# Patient Record
Sex: FEMALE | Race: WHITE | NOT HISPANIC OR LATINO | Employment: FULL TIME | URBAN - METROPOLITAN AREA
[De-identification: names, ages, dates, MRNs, and addresses within clinical notes are randomized per-mention and may not be internally consistent; named-entity substitution may affect disease eponyms.]

---

## 2017-02-02 ENCOUNTER — OFFICE VISIT (OUTPATIENT)
Dept: FAMILY MEDICINE | Facility: CLINIC | Age: 72
End: 2017-02-02
Payer: COMMERCIAL

## 2017-02-02 VITALS
RESPIRATION RATE: 20 BRPM | WEIGHT: 178.56 LBS | TEMPERATURE: 98 F | DIASTOLIC BLOOD PRESSURE: 86 MMHG | HEART RATE: 78 BPM | HEIGHT: 65 IN | BODY MASS INDEX: 29.75 KG/M2 | SYSTOLIC BLOOD PRESSURE: 132 MMHG | OXYGEN SATURATION: 96 %

## 2017-02-02 DIAGNOSIS — H10.33 ACUTE BACTERIAL CONJUNCTIVITIS OF BOTH EYES: ICD-10-CM

## 2017-02-02 DIAGNOSIS — J06.9 VIRAL URI WITH COUGH: Primary | ICD-10-CM

## 2017-02-02 PROCEDURE — 96372 THER/PROPH/DIAG INJ SC/IM: CPT | Mod: S$GLB,,, | Performed by: NURSE PRACTITIONER

## 2017-02-02 PROCEDURE — 1125F AMNT PAIN NOTED PAIN PRSNT: CPT | Mod: S$GLB,,, | Performed by: NURSE PRACTITIONER

## 2017-02-02 PROCEDURE — 1157F ADVNC CARE PLAN IN RCRD: CPT | Mod: S$GLB,,, | Performed by: NURSE PRACTITIONER

## 2017-02-02 PROCEDURE — 99214 OFFICE O/P EST MOD 30 MIN: CPT | Mod: 25,S$GLB,, | Performed by: NURSE PRACTITIONER

## 2017-02-02 PROCEDURE — 99999 PR PBB SHADOW E&M-EST. PATIENT-LVL IV: CPT | Mod: PBBFAC,,, | Performed by: NURSE PRACTITIONER

## 2017-02-02 PROCEDURE — 1160F RVW MEDS BY RX/DR IN RCRD: CPT | Mod: S$GLB,,, | Performed by: NURSE PRACTITIONER

## 2017-02-02 PROCEDURE — 1159F MED LIST DOCD IN RCRD: CPT | Mod: S$GLB,,, | Performed by: NURSE PRACTITIONER

## 2017-02-02 RX ORDER — FLUTICASONE PROPIONATE 50 MCG
2 SPRAY, SUSPENSION (ML) NASAL DAILY
Qty: 16 G | Refills: 0 | Status: SHIPPED | OUTPATIENT
Start: 2017-02-02 | End: 2018-02-14

## 2017-02-02 RX ORDER — OFLOXACIN 3 MG/ML
1 SOLUTION/ DROPS OPHTHALMIC 4 TIMES DAILY
Qty: 5 ML | Refills: 0 | Status: SHIPPED | OUTPATIENT
Start: 2017-02-02 | End: 2017-02-04 | Stop reason: SDUPTHER

## 2017-02-02 RX ORDER — DEXAMETHASONE SODIUM PHOSPHATE 4 MG/ML
8 INJECTION, SOLUTION INTRA-ARTICULAR; INTRALESIONAL; INTRAMUSCULAR; INTRAVENOUS; SOFT TISSUE
Status: COMPLETED | OUTPATIENT
Start: 2017-02-02 | End: 2017-02-02

## 2017-02-02 RX ORDER — PROMETHAZINE HYDROCHLORIDE AND DEXTROMETHORPHAN HYDROBROMIDE 6.25; 15 MG/5ML; MG/5ML
5 SYRUP ORAL
Qty: 180 ML | Refills: 0 | Status: SHIPPED | OUTPATIENT
Start: 2017-02-02 | End: 2017-02-12

## 2017-02-02 RX ORDER — TAZAROTENE 0.1 MG/G
CREAM CUTANEOUS
Refills: 6 | COMMUNITY
Start: 2016-12-27 | End: 2020-03-12 | Stop reason: CLARIF

## 2017-02-02 RX ORDER — LEVOCETIRIZINE DIHYDROCHLORIDE 5 MG/1
5 TABLET, FILM COATED ORAL NIGHTLY
Qty: 30 TABLET | Refills: 1 | Status: SHIPPED | OUTPATIENT
Start: 2017-02-02 | End: 2019-06-26

## 2017-02-02 RX ADMIN — DEXAMETHASONE SODIUM PHOSPHATE 8 MG: 4 INJECTION, SOLUTION INTRA-ARTICULAR; INTRALESIONAL; INTRAMUSCULAR; INTRAVENOUS; SOFT TISSUE at 02:02

## 2017-02-02 NOTE — MR AVS SNAPSHOT
[facility]  [address] [place] [address]  Phone: [phone]                  Gale Fernández   2017 2:00 PM   Office Visit    Description:  Female : 1945   Provider:  JIMMY SpencerC   Department:  [facility]           Reason for Visit     Sinus Problem           Diagnoses this Visit        Comments    Viral URI with cough    -  Primary            To Do List           Goals (5 Years of Data)     Increase water intake       Follow-Up and Disposition     Return if symptoms worsen or fail to improve.       These Medications        Disp Refills Start End    fluticasone (FLONASE) 50 mcg/actuation nasal spray 16 g 0 2017     2 sprays by Each Nare route once daily. - Each Nare    Pharmacy: [facility]/pharmacy #[address] [place], [place] [address] [facility] Ph #: [phone]       levocetirizine (XYZAL) 5 MG tablet 30 tablet 1 2017    Take 1 tablet (5 mg total) by mouth every evening. - Oral    Pharmacy: [facility]/pharmacy #[address] [place] [address] [facility] Ph #: [phone]       promethazine-dextromethorphan (PROMETHAZINE-DM) 6.25-15 mg/5 mL Syrp 180 mL 0 2017    Take 5 mLs by mouth every 4 to 6 hours as needed. - Oral    [facility] [place], [place] [address] [facility] Ph #: [phone]       ofloxacin (OCUFLOX) 0.3 % ophthalmic solution 5 mL 0 2017    Place 1 drop into both eyes 4 (four) times daily. - Both Eyes    [facility] [place], [place] [address] [facility] Ph #: [phone]         [facility]     [facility] -  Assistance  Registered nurses in the [facility] provide clinical advisement, health education, appointment booking, and other advisory services.  Call for this free service at [phone].             Medications           Message regarding Medications     Verify the  changes and/or additions to your medication regime listed below are the same as discussed with your clinician today.  If any of these changes or additions are incorrect, please notify your healthcare provider.        START taking these NEW medications        Refills    fluticasone (FLONASE) 50 mcg/actuation nasal spray 0    Si sprays by Each Nare route once daily.    Class: Normal    Route: Each Nare    levocetirizine (XYZAL) 5 MG tablet 1    Sig: Take 1 tablet (5 mg total) by mouth every evening.    Class: Normal    Route: Oral    promethazine-dextromethorphan (PROMETHAZINE-DM) 6.25-15 mg/5 mL Syrp 0    Sig: Take 5 mLs by mouth every 4 to 6 hours as needed.    Class: Normal    Route: Oral    ofloxacin (OCUFLOX) 0.3 % ophthalmic solution 0    Sig: Place 1 drop into both eyes 4 (four) times daily.    Class: Normal    Route: Both Eyes      These medications were administered today        Dose Freq    dexamethasone injection 8 mg 8 mg Clinic/HOD 1 time    Sig: Inject 2 mLs (8 mg total) into the muscle one time.    Class: Normal    Route: Intramuscular           Verify that the below list of medications is an accurate representation of the medications you are currently taking.  If none reported, the list may be blank. If incorrect, please contact your healthcare provider. Carry this list with you in case of emergency.           Current Medications     alprazolam (XANAX) 0.25 MG tablet TAKE 1 TABLET BY MOUTH AT BEDTIME AS NEEDED FOR ANXIETRY    dronedarone (MULTAQ) 400 mg Tab Take 1 tablet (400 mg total) by mouth 2 (two) times daily with meals.    fluticasone (FLONASE) 50 mcg/actuation nasal spray 2 sprays by Each Nare route once daily.    levocetirizine (XYZAL) 5 MG tablet Take 1 tablet (5 mg total) by mouth every evening.    lidocaine HCL 4% (XYLOCAINE) 4 % (40 mg/mL) external solution Apply 4 mLs topically as needed.    lidocaine-prilocaine (EMLA) cream Apply topically as needed.    metoprolol succinate  "(TOPROL-XL) 25 MG 24 hr tablet Take 1 tablet (25 mg total) by mouth once daily.    metoprolol succinate (TOPROL-XL) 25 MG 24 hr tablet TAKE 1 TABLET (25 MG TOTAL) BY MOUTH ONCE DAILY.    MULTAQ 400 mg Tab TAKE 1 TABLET (400 MG TOTAL) BY MOUTH 2 (TWO) TIMES DAILY WITH MEALS.    nitrofurantoin, macrocrystal-monohydrate, (MACROBID) 100 MG capsule Take postcoital    ofloxacin (OCUFLOX) 0.3 % ophthalmic solution Place 1 drop into both eyes 4 (four) times daily.    promethazine-dextromethorphan (PROMETHAZINE-DM) 6.25-15 mg/5 mL Syrp Take 5 mLs by mouth every 4 to 6 hours as needed.    rivaroxaban (XARELTO) 20 mg Tab Take 1 tablet (20 mg total) by mouth every evening.    TAZORAC 0.1 % cream APPLY TO AFFECTED AREA EVERY DAY AT BEDTIME           Clinical Reference Information           Your Vitals Were     BP Pulse Temp Resp Height Weight    132/86 (BP Location: Right arm, Patient Position: Sitting, BP Method: Manual) 78 98.3 °F (36.8 °C) (Oral) 20 5' 5" (1.651 m) 81 kg (178 lb 9.2 oz)    Last Period SpO2 BMI          07/24/2012 96% 29.72 kg/m2        Blood Pressure          Most Recent Value    BP  132/86      Allergies as of 2/2/2017     Flecainide      Immunizations Administered on Date of Encounter - 2/2/2017     None      Instructions      Viral Upper Respiratory Illness (Adult)  You have a viral upper respiratory illness (URI), which is another term for the common cold. This illness is contagious during the first few days. It is spread through the air by coughing and sneezing. It may also be spread by direct contact (touching the sick person and then touching your own eyes, nose, or mouth). Frequent handwashing will decrease risk of spread. Most viral illnesses go away within 7 to 10 days with rest and simple home remedies. Sometimes the illness may last for several weeks. Antibiotics will not kill a virus, and they are generally not prescribed for this condition.    Home care  · If symptoms are severe, rest at home " for the first 2 to 3 days. When you resume activity, don't let yourself get too tired.  · Avoid being exposed to cigarette smoke (yours or others).  · You may use acetaminophen or ibuprofen to control pain and fever, unless another medicine was prescribed. (Note: If you have chronic liver or kidney disease, have ever had a stomach ulcer or gastrointestinal bleeding, or are taking blood-thinning medicines, talk with your healthcare provider before using these medicines.) Aspirin should never be given to anyone under 18 years of age who is ill with a viral infection or fever. It may cause severe liver or brain damage.  · Your appetite may be poor, so a light diet is fine. Avoid dehydration by drinking 6 to 8 glasses of fluids per day (water, soft drinks, juices, tea, or soup). Extra fluids will help loosen secretions in the nose and lungs.  · Over-the-counter cold medicines will not shorten the length of time youre sick, but they may be helpful for the following symptoms: cough, sore throat, and nasal and sinus congestion. (Note: Do not use decongestants if you have high blood pressure.)  Follow-up care  Follow up with your healthcare provider, or as advised.  When to seek medical advice  Call your healthcare provider right away if any of these occur:  · Cough with lots of colored sputum (mucus)  · Severe headache; face, neck, or ear pain  · Difficulty swallowing due to throat pain  · Fever of 100.4°F (38°C)  Call 911, or get immediate medical care  Call emergency services right away if any of these occur:  · Chest pain, shortness of breath, wheezing, or difficulty breathing  · Coughing up blood  · Inability to swallow due to throat pain  © 0467-4628 [employer]. [address] [place] [address], [place] [address]. All rights reserved. This information is not intended as a substitute for professional medical care. Always follow your healthcare professional's instructions.        Conjunctivitis, Viral    Viral  conjunctivitis (sometimes called pink eye) is a common infection of the eye. It is very contagious. Touching the infected eye, then touching another person passes this infection. It can also be spread from one eye to the other in this same way. The most common symptoms include redness, discharge from the eye, swollen eyelids, and a gritty or scratchy feeling in the eye.  This condition will take about 7 to 10 days to go away. Artificial tears (available without a prescription) are often recommended to moisten and clean the eyes. Antibiotic eye drops often are not recommended because they will not kill the virus. But sometimes they may be prescribed by eye doctors. This is to prevent a second, bacterial infection.  Home care  · Apply a towel soaked in cool water to the affected eye 3 to 4 times a day (just before applying medicine to the eye).  · It is common to have mucus drainage during the night, causing the eyelids to become crusted by morning. Use a warm, wet cloth to wipe this away.  · Launder cloths used to clean the eye after one use. Do not reuse them.  · If antibiotic medicines are prescribed, take them exactly as directed. Do not stop taking them until you are told to.  · You may use acetaminophen or ibuprofen to control pain, unless another medicine was prescribed. (Note:If you have chronic liver or kidney disease, or if you have ever had a stomach ulcer or gastrointestinal bleeding, talk with your healthcare provider before using these medicines.) Aspirin should never be used in anyone under 18 years of age who is ill with a fever. It may cause severe liver damage.  · Wash your hands before and after touching the affected eye. This helps to prevent spreading the infection to your other eye and to others.  · The infected person should avoid sharing towels, washcloths, and bedding with others. This is to prevent spreading the infection.  · This illness is contagious during the first week. Children with  this illness should be kept out of day care and school until the redness clears.  Follow-up care  Follow up with your healthcare provider, or as advised.  When to seek medical advice  Call your healthcare provider right away if any of these occur:  · Worsening vision  · Increasing pain in the eye  · Increasing swelling or redness of the eyelid  · Redness spreading to the face around the eye  · Large amount of green or yellow drainage from the eye  · Severe itching in or around the eye  · Fever of 100.4°F (38°C) or higher  © 0709-4483 [employer]. [address] [place] [address]. All rights reserved. This information is not intended as a substitute for professional medical care. Always follow your healthcare professional's instructions.             Language Assistance Services     ATTENTION: Language assistance services are available, free of charge. Please call [phone].      ATENCIÓN: Si nadege francisco, tiene a briones disposición servicios gratuitos de asistencia lingüística. Llame al [phone].     CHÚ Ý: N?u b?n nói Ti?ng Vi?t, có các d?ch v? h? tr? ngôn ng? mi?n phí dành cho b?n. G?i s? [phone].         [facility] complies with applicable Federal civil rights laws and does not discriminate on the basis of race, color, national origin, age, disability, or sex.

## 2017-02-02 NOTE — PROGRESS NOTES
Patient tolerate Decadron 8 mg IM injection. Patient advise to wait 15 min after injection  for assessment of any posssible side effects.

## 2017-02-02 NOTE — PATIENT INSTRUCTIONS
Viral Upper Respiratory Illness (Adult)  You have a viral upper respiratory illness (URI), which is another term for the common cold. This illness is contagious during the first few days. It is spread through the air by coughing and sneezing. It may also be spread by direct contact (touching the sick person and then touching your own eyes, nose, or mouth). Frequent handwashing will decrease risk of spread. Most viral illnesses go away within 7 to 10 days with rest and simple home remedies. Sometimes the illness may last for several weeks. Antibiotics will not kill a virus, and they are generally not prescribed for this condition.    Home care  · If symptoms are severe, rest at home for the first 2 to 3 days. When you resume activity, don't let yourself get too tired.  · Avoid being exposed to cigarette smoke (yours or others).  · You may use acetaminophen or ibuprofen to control pain and fever, unless another medicine was prescribed. (Note: If you have chronic liver or kidney disease, have ever had a stomach ulcer or gastrointestinal bleeding, or are taking blood-thinning medicines, talk with your healthcare provider before using these medicines.) Aspirin should never be given to anyone under 18 years of age who is ill with a viral infection or fever. It may cause severe liver or brain damage.  · Your appetite may be poor, so a light diet is fine. Avoid dehydration by drinking 6 to 8 glasses of fluids per day (water, soft drinks, juices, tea, or soup). Extra fluids will help loosen secretions in the nose and lungs.  · Over-the-counter cold medicines will not shorten the length of time youre sick, but they may be helpful for the following symptoms: cough, sore throat, and nasal and sinus congestion. (Note: Do not use decongestants if you have high blood pressure.)  Follow-up care  Follow up with your healthcare provider, or as advised.  When to seek medical advice  Call your healthcare provider right away if any  of these occur:  · Cough with lots of colored sputum (mucus)  · Severe headache; face, neck, or ear pain  · Difficulty swallowing due to throat pain  · Fever of 100.4°F (38°C)  Call 911, or get immediate medical care  Call emergency services right away if any of these occur:  · Chest pain, shortness of breath, wheezing, or difficulty breathing  · Coughing up blood  · Inability to swallow due to throat pain  © 2000-2016 [employer]. [address] [place] [address], [place] [address]. All rights reserved. This information is not intended as a substitute for professional medical care. Always follow your healthcare professional's instructions.        Conjunctivitis, Viral    Viral conjunctivitis (sometimes called pink eye) is a common infection of the eye. It is very contagious. Touching the infected eye, then touching another person passes this infection. It can also be spread from one eye to the other in this same way. The most common symptoms include redness, discharge from the eye, swollen eyelids, and a gritty or scratchy feeling in the eye.  This condition will take about 7 to 10 days to go away. Artificial tears (available without a prescription) are often recommended to moisten and clean the eyes. Antibiotic eye drops often are not recommended because they will not kill the virus. But sometimes they may be prescribed by eye doctors. This is to prevent a second, bacterial infection.  Home care  · Apply a towel soaked in cool water to the affected eye 3 to 4 times a day (just before applying medicine to the eye).  · It is common to have mucus drainage during the night, causing the eyelids to become crusted by morning. Use a warm, wet cloth to wipe this away.  · Launder cloths used to clean the eye after one use. Do not reuse them.  · If antibiotic medicines are prescribed, take them exactly as directed. Do not stop taking them until you are told to.  · You may use acetaminophen or ibuprofen to control pain,  unless another medicine was prescribed. (Note:If you have chronic liver or kidney disease, or if you have ever had a stomach ulcer or gastrointestinal bleeding, talk with your healthcare provider before using these medicines.) Aspirin should never be used in anyone under 18 years of age who is ill with a fever. It may cause severe liver damage.  · Wash your hands before and after touching the affected eye. This helps to prevent spreading the infection to your other eye and to others.  · The infected person should avoid sharing towels, washcloths, and bedding with others. This is to prevent spreading the infection.  · This illness is contagious during the first week. Children with this illness should be kept out of day care and school until the redness clears.  Follow-up care  Follow up with your healthcare provider, or as advised.  When to seek medical advice  Call your healthcare provider right away if any of these occur:  · Worsening vision  · Increasing pain in the eye  · Increasing swelling or redness of the eyelid  · Redness spreading to the face around the eye  · Large amount of green or yellow drainage from the eye  · Severe itching in or around the eye  · Fever of 100.4°F (38°C) or higher  © 6468-3370 [employer]. [address] [place] [address], [place] [address]. All rights reserved. This information is not intended as a substitute for professional medical care. Always follow your healthcare professional's instructions.

## 2017-02-02 NOTE — PROGRESS NOTES
Subjective:       Patient ID: Gale Fernández is a 71 y.o. female.    Chief Complaint: Sinus Problem    URI    This is a new problem. The current episode started in the past 7 days. The problem has been gradually worsening. There has been no fever. Associated symptoms include congestion, coughing, headaches, rhinorrhea, sinus pain and a sore throat. Pertinent negatives include no ear pain or sneezing. Treatments tried: OTC medication. The treatment provided mild relief.       Past Medical History   Diagnosis Date    Atrial fibrillation      Followed by [facility] cardiology    Borderline hyperlipidemia     History of abnormal Pap smear      More than 20 years ago; status post colposcopy    History of anxiety disorder     History of hypertension      Currently doing okay off medication    History of ventricular tachycardia      (PSVT) Status post ablation    Osteopenia      Refuses medication    Overweight(278.02)        Social History     Social History    Marital status:      Spouse name: N/A    Number of children: 1    Years of education: N/A     Occupational History    Teacher      Social History Main Topics    Smoking status: Former Smoker     Types: Cigarettes    Smokeless tobacco: Not on file    Alcohol use Yes      Comment: Rarely    Drug use: No    Sexual activity: Not on file     Other Topics Concern    Not on file     Social History Narrative       Past Surgical History   Procedure Laterality Date    Psvt ablation       section, low transverse       X1    Skin cancer removal on the left eye      Left knee meniscus surgery  2012    Neck tuck and liposuction  2012       Review of Systems   Constitutional: Positive for chills. Negative for fever.   HENT: Positive for congestion, postnasal drip, rhinorrhea, sinus pressure and sore throat. Negative for ear pain, sneezing and trouble swallowing.    Eyes: Positive for discharge, redness and itching.   Respiratory: Positive for cough.   "  Neurological: Positive for headaches.   All other systems reviewed and are negative.      Objective:     Visit Vitals    /86 (BP Location: Right arm, Patient Position: Sitting, BP Method: Manual)    Pulse 78    Temp 98.3 °F (36.8 °C) (Oral)    Resp 20    Ht 5' 5" (1.651 m)    Wt 81 kg (178 lb 9.2 oz)    LMP 07/24/2012    SpO2 96%    BMI 29.72 kg/m2        Physical Exam   Constitutional: She is oriented to person, place, and time. She appears well-developed and well-nourished. She is cooperative.   HENT:   Head: Normocephalic and atraumatic.   Right Ear: Hearing, external ear and ear canal normal. A middle ear effusion is present.   Left Ear: Hearing, external ear and ear canal normal. A middle ear effusion is present.   Nose: Mucosal edema and rhinorrhea present. Right sinus exhibits maxillary sinus tenderness. Left sinus exhibits maxillary sinus tenderness.   Mouth/Throat: Uvula is midline and mucous membranes are normal. No oropharyngeal exudate, posterior oropharyngeal edema or posterior oropharyngeal erythema.   Eyes: Right eye exhibits discharge. Left eye exhibits discharge and exudate. Right conjunctiva is injected. Left conjunctiva is injected.   Cardiovascular: Normal rate and regular rhythm.    Pulmonary/Chest: Effort normal and breath sounds normal. No respiratory distress. She has no decreased breath sounds. She has no wheezes. She has no rhonchi. She has no rales.   Lymphadenopathy:     She has no cervical adenopathy.   Neurological: She is alert and oriented to person, place, and time.   Skin: Skin is warm, dry and intact. She is not diaphoretic. No pallor.   Psychiatric: She has a normal mood and affect. Her speech is normal and behavior is normal.   Vitals reviewed.      Assessment:       1. Viral URI with cough    2. Acute bacterial conjunctivitis of both eyes        Plan:       Gale was seen today for sinus problem.    Diagnoses and all orders for this visit:    Viral URI with " "cough  -     dexamethasone injection 8 mg; Inject 2 mLs (8 mg total) into the muscle one time.  -     fluticasone (FLONASE) 50 mcg/actuation nasal spray; 2 sprays by Each Nare route once daily.  -     levocetirizine (XYZAL) 5 MG tablet; Take 1 tablet (5 mg total) by mouth every evening.  -     promethazine-dextromethorphan (PROMETHAZINE-DM) 6.25-15 mg/5 mL Syrp; Take 5 mLs by mouth every 4 to 6 hours as needed.    Acute bacterial conjunctivitis of both eyes  -     ofloxacin (OCUFLOX) 0.3 % ophthalmic solution; Place 1 drop into both eyes 4 (four) times daily.    Patient provided information on diagnosis obtained through [employer]. She verbalized understanding. Advised to discard contacts and were glasses for 7-10 days.   Return if symptoms worsen or fail to improve.  "This note will not be shared with the patient."  "

## 2017-02-03 ENCOUNTER — NURSE TRIAGE (OUTPATIENT)
Dept: ADMINISTRATIVE | Facility: CLINIC | Age: 72
End: 2017-02-03

## 2017-02-04 ENCOUNTER — OFFICE VISIT (OUTPATIENT)
Dept: FAMILY MEDICINE | Facility: CLINIC | Age: 72
End: 2017-02-04
Payer: COMMERCIAL

## 2017-02-04 VITALS
WEIGHT: 177.81 LBS | HEART RATE: 78 BPM | BODY MASS INDEX: 29.62 KG/M2 | HEIGHT: 65 IN | OXYGEN SATURATION: 98 % | DIASTOLIC BLOOD PRESSURE: 76 MMHG | SYSTOLIC BLOOD PRESSURE: 148 MMHG | TEMPERATURE: 98 F

## 2017-02-04 DIAGNOSIS — H92.02 OTALGIA, LEFT: ICD-10-CM

## 2017-02-04 DIAGNOSIS — H72.92 TYMPANIC MEMBRANE RUPTURE, LEFT: Primary | ICD-10-CM

## 2017-02-04 DIAGNOSIS — I48.0 PAROXYSMAL ATRIAL FIBRILLATION: ICD-10-CM

## 2017-02-04 DIAGNOSIS — Z79.01 CURRENT USE OF LONG TERM ANTICOAGULATION: ICD-10-CM

## 2017-02-04 PROCEDURE — 1159F MED LIST DOCD IN RCRD: CPT | Mod: S$GLB,,, | Performed by: FAMILY MEDICINE

## 2017-02-04 PROCEDURE — 1125F AMNT PAIN NOTED PAIN PRSNT: CPT | Mod: S$GLB,,, | Performed by: FAMILY MEDICINE

## 2017-02-04 PROCEDURE — 99999 PR PBB SHADOW E&M-EST. PATIENT-LVL IV: CPT | Mod: PBBFAC,,, | Performed by: FAMILY MEDICINE

## 2017-02-04 PROCEDURE — 1157F ADVNC CARE PLAN IN RCRD: CPT | Mod: S$GLB,,, | Performed by: FAMILY MEDICINE

## 2017-02-04 PROCEDURE — 99214 OFFICE O/P EST MOD 30 MIN: CPT | Mod: S$GLB,,, | Performed by: FAMILY MEDICINE

## 2017-02-04 PROCEDURE — 1160F RVW MEDS BY RX/DR IN RCRD: CPT | Mod: S$GLB,,, | Performed by: FAMILY MEDICINE

## 2017-02-04 RX ORDER — AMOXICILLIN AND CLAVULANATE POTASSIUM 875; 125 MG/1; MG/1
1 TABLET, FILM COATED ORAL 2 TIMES DAILY
Qty: 20 TABLET | Refills: 0 | Status: SHIPPED | OUTPATIENT
Start: 2017-02-04 | End: 2017-02-14

## 2017-02-04 RX ORDER — ACETAMINOPHEN AND CODEINE PHOSPHATE 300; 30 MG/1; MG/1
1 TABLET ORAL
Qty: 25 TABLET | Refills: 0 | Status: SHIPPED | OUTPATIENT
Start: 2017-02-04 | End: 2017-02-14

## 2017-02-04 RX ORDER — OFLOXACIN 3 MG/ML
1 SOLUTION/ DROPS OPHTHALMIC 4 TIMES DAILY
Qty: 5 ML | Refills: 0 | Status: SHIPPED | OUTPATIENT
Start: 2017-02-04 | End: 2017-02-14

## 2017-02-04 NOTE — PROGRESS NOTES
Chief Complaint   Patient presents with    Otalgia       HPI  Gale Fernández is a 71 y.o. female with multiple medical diagnoses as listed in the medical history and problem list that presents for evaluation for left ear pain for several days. She was seen two days ago and diagnosed with a viral URI for which she was given a steroid shot along with antibiotics for an eye infection. She has misplaced these. She has felt pressure and fluid in her ear. Last night she was blowing her nose when she felt fluid running out and noticed it was bloody. She held her xarelto which she takes for atrial fibrillation. This AM it has stopped bleeding but she is having some pain. She has mild hearing loss in her ear.     PAST MEDICAL HISTORY:  Past Medical History   Diagnosis Date    Atrial fibrillation      Followed by EP cardiology    Borderline hyperlipidemia     History of abnormal Pap smear      More than 20 years ago; status post colposcopy    History of anxiety disorder     History of hypertension      Currently doing okay off medication    History of ventricular tachycardia      (PSVT) Status post ablation    Osteopenia      Refuses medication    Overweight(278.02)        PAST SURGICAL HISTORY:  Past Surgical History   Procedure Laterality Date    Psvt ablation       section, low transverse       X1    Skin cancer removal on the left eye      Left knee meniscus surgery  2012    Neck tuck and liposuction  2012       SOCIAL HISTORY:  Social History     Social History    Marital status:      Spouse name: N/A    Number of children: 1    Years of education: N/A     Occupational History    Teacher      Social History Main Topics    Smoking status: Former Smoker     Types: Cigarettes    Smokeless tobacco: Not on file    Alcohol use Yes      Comment: Rarely    Drug use: No    Sexual activity: Not on file     Other Topics Concern    Not on file     Social History Narrative       FAMILY  HISTORY:  Family History   Problem Relation Age of Onset    Heart disease Mother     Cancer Father      nasopharynx    Breast cancer Cousin     Hypertension Sister     Diabetes Sister     Coronary artery disease Brother      s/p stenting    Hypertension Brother     Rheum arthritis Sister     Hypertension Brother     Hypertension Brother     Heart disease Brother      s/p CABG    Hypertension Brother     Coronary artery disease Brother      s/p stenting    Colon cancer Neg Hx        ALLERGIES AND MEDICATIONS: updated and reviewed.  Review of patient's allergies indicates:   Allergen Reactions    Flecainide      Other reaction(s): worsening arrythmia     Current Outpatient Prescriptions   Medication Sig Dispense Refill    alprazolam (XANAX) 0.25 MG tablet TAKE 1 TABLET BY MOUTH AT BEDTIME AS NEEDED FOR ANXIETRY 30 tablet 1    dronedarone (MULTAQ) 400 mg Tab Take 1 tablet (400 mg total) by mouth 2 (two) times daily with meals. 90 tablet 3    levocetirizine (XYZAL) 5 MG tablet Take 1 tablet (5 mg total) by mouth every evening. 30 tablet 1    lidocaine HCL 4% (XYLOCAINE) 4 % (40 mg/mL) external solution Apply 4 mLs topically as needed. 50 mL 2    lidocaine-prilocaine (EMLA) cream Apply topically as needed. 25 g 0    metoprolol succinate (TOPROL-XL) 25 MG 24 hr tablet Take 1 tablet (25 mg total) by mouth once daily. 90 tablet 3    metoprolol succinate (TOPROL-XL) 25 MG 24 hr tablet TAKE 1 TABLET (25 MG TOTAL) BY MOUTH ONCE DAILY. 30 tablet 10    MULTAQ 400 mg Tab TAKE 1 TABLET (400 MG TOTAL) BY MOUTH 2 (TWO) TIMES DAILY WITH MEALS. 60 tablet 10    promethazine-dextromethorphan (PROMETHAZINE-DM) 6.25-15 mg/5 mL Syrp Take 5 mLs by mouth every 4 to 6 hours as needed. 180 mL 0    rivaroxaban (XARELTO) 20 mg Tab Take 1 tablet (20 mg total) by mouth every evening. 90 tablet 3    TAZORAC 0.1 % cream APPLY TO AFFECTED AREA EVERY DAY AT BEDTIME  6    acetaminophen-codeine 300-30mg (TYLENOL #3) 300-30 mg  "Tab Take 1 tablet by mouth every 4 to 6 hours as needed. 25 tablet 0    amoxicillin-clavulanate 875-125mg (AUGMENTIN) 875-125 mg per tablet Take 1 tablet by mouth 2 (two) times daily. 20 tablet 0    fluticasone (FLONASE) 50 mcg/actuation nasal spray 2 sprays by Each Nare route once daily. 16 g 0    nitrofurantoin, macrocrystal-monohydrate, (MACROBID) 100 MG capsule Take postcoital 30 capsule 0    ofloxacin (OCUFLOX) 0.3 % ophthalmic solution Place 1 drop into both eyes 4 (four) times daily. 5 mL 0     No current facility-administered medications for this visit.        ROS  Review of Systems   Constitutional: Negative for chills, diaphoresis, fatigue, fever and unexpected weight change.   HENT: Positive for congestion and ear pain. Negative for rhinorrhea, sinus pressure, sore throat and tinnitus.    Eyes: Negative for photophobia and visual disturbance.   Respiratory: Positive for cough. Negative for shortness of breath and wheezing.    Cardiovascular: Negative for chest pain and palpitations.   Gastrointestinal: Negative for abdominal pain, blood in stool, constipation, diarrhea, nausea and vomiting.   Genitourinary: Negative for dysuria, flank pain, frequency and vaginal discharge.   Musculoskeletal: Negative for arthralgias and joint swelling.   Skin: Negative for rash.   Neurological: Negative for speech difficulty, weakness, light-headedness and headaches.   Psychiatric/Behavioral: Negative for behavioral problems and dysphoric mood.       Physical Exam  Vitals:    02/04/17 0823   BP: (!) 148/76   Pulse: 78   Temp: 97.9 °F (36.6 °C)    Body mass index is 29.59 kg/(m^2).  Weight: 80.7 kg (177 lb 12.8 oz)   Height: 5' 5" (165.1 cm)     Physical Exam   Constitutional: She is oriented to person, place, and time. She appears well-developed and well-nourished.   HENT:   Right Ear: Tympanic membrane and external ear normal.   Left Ear: Tympanic membrane is perforated.   Ears:    Eyes: EOM are normal. "   Neurological: She is alert and oriented to person, place, and time.   Skin: Skin is warm and dry. No rash noted. No erythema.   Psychiatric: She has a normal mood and affect. Her behavior is normal.   Nursing note and vitals reviewed.      Health Maintenance       Date Due Completion Date    Fecal Occult Blood Test (FOBT) 9/22/2015 9/22/2014    Pneumococcal (65+) (2 of 2 - PPSV23) 6/20/2017 6/20/2016    Mammogram 10/13/2017 10/13/2016    DEXA SCAN 10/13/2018 10/13/2016    Override on 7/7/2011: Done    Lipid Panel 8/20/2021 8/20/2016    Colonoscopy 3/10/2025 3/10/2015 (Declined)    Override on 3/10/2015: Declined    TETANUS VACCINE 6/6/2026 6/6/2016            ASSESSMENT     1. Tympanic membrane rupture, left    2. Otalgia, left    3. Paroxysmal atrial fibrillation    4. Current use of long term anticoagulation        PLAN:     Tympanic membrane rupture, left  -     ofloxacin (OCUFLOX) 0.3 % ophthalmic solution; Place 1 drop into both eyes 4 (four) times daily.  Dispense: 5 mL; Refill: 0  -     amoxicillin-clavulanate 875-125mg (AUGMENTIN) 875-125 mg per tablet; Take 1 tablet by mouth 2 (two) times daily.  Dispense: 20 tablet; Refill: 0  -     acetaminophen-codeine 300-30mg (TYLENOL #3) 300-30 mg Tab; Take 1 tablet by mouth every 4 to 6 hours as needed.  Dispense: 25 tablet; Refill: 0    Otalgia, left  -     ofloxacin (OCUFLOX) 0.3 % ophthalmic solution; Place 1 drop into both eyes 4 (four) times daily.  Dispense: 5 mL; Refill: 0  -     amoxicillin-clavulanate 875-125mg (AUGMENTIN) 875-125 mg per tablet; Take 1 tablet by mouth 2 (two) times daily.  Dispense: 20 tablet; Refill: 0  -     acetaminophen-codeine 300-30mg (TYLENOL #3) 300-30 mg Tab; Take 1 tablet by mouth every 4 to 6 hours as needed.  Dispense: 25 tablet; Refill: 0    Paroxysmal atrial fibrillation    Current use of long term anticoagulation      Recommend she avoid water in the ear along with q tips, may use ear plugs while washing hair  Will give  prophylactic abx  RTC in two weeks and if not healed will get ENT consult  She should resume her xarelto    Mecca Peraza MD  02/04/2017 8:54 AM        Return in about 2 weeks (around 2/18/2017) for Follow up.

## 2017-02-04 NOTE — TELEPHONE ENCOUNTER
"    Reason for Disposition   Unexplained bleeding from ear    Answer Assessment - Initial Assessment Questions  1. LOCATION: "Which ear is involved?"       Left   2. COLOR: "What is the color of the discharge?"       Blood, red  3. CONSISTENCY: "How runny is the discharge? Could it be water?"       Not water  4. ONSET: "When did you first notice the discharge?"      Tonight, after blowing the nose and trying to relieve pressure inside of the ear  5. PAIN: "Is there any earache?" "How bad is it?"  (Scale 1-10; or mild, moderate, severe)      minor  6. OBJECTS: "Any use of q-tips or have you inserted anything else in your ear?"      no  7. OTHER SYMPTOMS: "Do you have any other symptoms?" (e.g., headache, fever, dizziness, vomiting, runny nose)      Cold symptoms  8. PREGNANCY: "Is there any chance you are pregnant?" "When was your last menstrual period?"      n/a    Protocols used:  EAR - [id number]    Patient tried to relieve the pressure in her ear by blowing her nose hard and notice a small amount of blood coming from the left ear. Minor pain. Patient placed a small piece of cotton in the ear. Advised patient to be seen at urgent care tomorrow. She verbalized understanding.   "

## 2017-02-04 NOTE — MR AVS SNAPSHOT
[facility]  [address]  [place] [address]  Phone: [phone]  Fax: [phone]                  Gale Fernández   2017 8:45 AM   Office Visit    Description:  Female : 1945   Provider:  Mecca Peraza MD   Department:  [facility]           Reason for Visit     Otalgia           Diagnoses this Visit        Comments    Tympanic membrane rupture, left    -  Primary     Otalgia, left                To Do List           Future Appointments        Provider Department Dept Phone    2017 8:45 AM Mecca Peraaz MD [facility] [phone]      Goals (5 Years of Data)     Increase water intake       Follow-Up and Disposition     Return in about 2 weeks (around 2017) for Follow up.       These Medications        Disp Refills Start End    ofloxacin (OCUFLOX) 0.3 % ophthalmic solution 5 mL 0 2017    Place 1 drop into both eyes 4 (four) times daily. - Both Eyes    Pharmacy: [facility]/pharmacy #[id number] - [address] Ph #: [phone]       amoxicillin-clavulanate 875-125mg (AUGMENTIN) 875-125 mg per tablet 20 tablet 0 2017    Take 1 tablet by mouth 2 (two) times daily. - Oral    Pharmacy: [facility]/pharmacy #[id number] [address] Ph #: [phone]         [facility] On Call     [facility] On Call Nurse Care Line -  Assistance  Registered nurses in the [facility] provide clinical advisement, health education, appointment booking, and other advisory services.  Call for this free service at [phone].             Medications           Message regarding Medications     Verify the changes and/or additions to your medication regime listed below are the same as discussed with your clinician today.  If any of these changes or additions are incorrect, please notify your healthcare provider.        START taking these NEW medications        Refills     amoxicillin-clavulanate 875-125mg (AUGMENTIN) 875-125 mg per tablet 0    Sig: Take 1 tablet by mouth 2 (two) times daily.    Class: Normal    Route: Oral           Verify that the below list of medications is an accurate representation of the medications you are currently taking.  If none reported, the list may be blank. If incorrect, please contact your healthcare provider. Carry this list with you in case of emergency.           Current Medications     alprazolam (XANAX) 0.25 MG tablet TAKE 1 TABLET BY MOUTH AT BEDTIME AS NEEDED FOR ANXIETRY    dronedarone (MULTAQ) 400 mg Tab Take 1 tablet (400 mg total) by mouth 2 (two) times daily with meals.    levocetirizine (XYZAL) 5 MG tablet Take 1 tablet (5 mg total) by mouth every evening.    lidocaine HCL 4% (XYLOCAINE) 4 % (40 mg/mL) external solution Apply 4 mLs topically as needed.    lidocaine-prilocaine (EMLA) cream Apply topically as needed.    metoprolol succinate (TOPROL-XL) 25 MG 24 hr tablet Take 1 tablet (25 mg total) by mouth once daily.    metoprolol succinate (TOPROL-XL) 25 MG 24 hr tablet TAKE 1 TABLET (25 MG TOTAL) BY MOUTH ONCE DAILY.    MULTAQ 400 mg Tab TAKE 1 TABLET (400 MG TOTAL) BY MOUTH 2 (TWO) TIMES DAILY WITH MEALS.    promethazine-dextromethorphan (PROMETHAZINE-DM) 6.25-15 mg/5 mL Syrp Take 5 mLs by mouth every 4 to 6 hours as needed.    rivaroxaban (XARELTO) 20 mg Tab Take 1 tablet (20 mg total) by mouth every evening.    TAZORAC 0.1 % cream APPLY TO AFFECTED AREA EVERY DAY AT BEDTIME    amoxicillin-clavulanate 875-125mg (AUGMENTIN) 875-125 mg per tablet Take 1 tablet by mouth 2 (two) times daily.    fluticasone (FLONASE) 50 mcg/actuation nasal spray 2 sprays by Each Nare route once daily.    nitrofurantoin, macrocrystal-monohydrate, (MACROBID) 100 MG capsule Take postcoital    ofloxacin (OCUFLOX) 0.3 % ophthalmic solution Place 1 drop into both eyes 4 (four) times daily.           Clinical Reference Information           Your Vitals Were      "BP Pulse Temp Height Weight Last Period    148/76 (BP Location: Left arm, Patient Position: Sitting) 78 97.9 °F (36.6 °C) (Oral) 5' 5" (1.651 m) 80.7 kg (177 lb 12.8 oz) 07/24/2012    SpO2 BMI             98% 29.59 kg/m2         Blood Pressure          Most Recent Value    BP  (!)  148/76      Allergies as of 2/4/2017     Flecainide      Immunizations Administered on Date of Encounter - 2/4/2017     None      Language Assistance Services     ATTENTION: Language assistance services are available, free of charge. Please call [phone].      ATENCIÓN: Si habla español, tiene a briones disposición servicios gratuitos de asistencia lingüística. Llame al [phone].     CHÚ Ý: N?u b?n nói Ti?ng Vi?t, có các d?ch v? h? tr? ngôn ng? mi?n phí dành cho b?n. G?i s? [phone].         [facility] complies with applicable Federal civil rights laws and does not discriminate on the basis of race, color, national origin, age, disability, or sex.        "

## 2017-08-01 DIAGNOSIS — I48.91 ATRIAL FIBRILLATION, UNSPECIFIED TYPE: Primary | ICD-10-CM

## 2017-08-01 DIAGNOSIS — I48.91 ATRIAL FIBRILLATION: ICD-10-CM

## 2017-08-02 ENCOUNTER — OFFICE VISIT (OUTPATIENT)
Dept: ELECTROPHYSIOLOGY | Facility: CLINIC | Age: 72
End: 2017-08-02
Payer: COMMERCIAL

## 2017-08-02 ENCOUNTER — HOSPITAL ENCOUNTER (OUTPATIENT)
Dept: CARDIOLOGY | Facility: CLINIC | Age: 72
Discharge: HOME | End: 2017-08-02
Payer: COMMERCIAL

## 2017-08-02 VITALS
HEART RATE: 60 BPM | WEIGHT: 180 LBS | BODY MASS INDEX: 29.99 KG/M2 | HEIGHT: 65 IN | DIASTOLIC BLOOD PRESSURE: 74 MMHG | SYSTOLIC BLOOD PRESSURE: 126 MMHG

## 2017-08-02 DIAGNOSIS — I48.0 PAROXYSMAL ATRIAL FIBRILLATION: Primary | ICD-10-CM

## 2017-08-02 DIAGNOSIS — Z86.79 HISTORY OF HYPERTENSION: ICD-10-CM

## 2017-08-02 DIAGNOSIS — Z51.81 VISIT FOR MONITORING MULTAQ THERAPY: ICD-10-CM

## 2017-08-02 DIAGNOSIS — I48.91 ATRIAL FIBRILLATION, UNSPECIFIED TYPE: ICD-10-CM

## 2017-08-02 DIAGNOSIS — R00.2 PALPITATIONS: ICD-10-CM

## 2017-08-02 DIAGNOSIS — E78.5 BORDERLINE HYPERLIPIDEMIA: ICD-10-CM

## 2017-08-02 DIAGNOSIS — I47.10 SVT (SUPRAVENTRICULAR TACHYCARDIA): ICD-10-CM

## 2017-08-02 DIAGNOSIS — Z79.01 CURRENT USE OF LONG TERM ANTICOAGULATION: ICD-10-CM

## 2017-08-02 DIAGNOSIS — Z79.899 VISIT FOR MONITORING MULTAQ THERAPY: ICD-10-CM

## 2017-08-02 PROCEDURE — 1159F MED LIST DOCD IN RCRD: CPT | Mod: S$GLB,,, | Performed by: NURSE PRACTITIONER

## 2017-08-02 PROCEDURE — 99214 OFFICE O/P EST MOD 30 MIN: CPT | Mod: S$GLB,,, | Performed by: NURSE PRACTITIONER

## 2017-08-02 PROCEDURE — 1126F AMNT PAIN NOTED NONE PRSNT: CPT | Mod: S$GLB,,, | Performed by: NURSE PRACTITIONER

## 2017-08-02 PROCEDURE — 93000 ELECTROCARDIOGRAM COMPLETE: CPT | Mod: S$GLB,,, | Performed by: INTERNAL MEDICINE

## 2017-08-02 PROCEDURE — 99999 PR PBB SHADOW E&M-EST. PATIENT-LVL III: CPT | Mod: PBBFAC,,, | Performed by: NURSE PRACTITIONER

## 2017-08-02 RX ORDER — METOPROLOL SUCCINATE 25 MG/1
TABLET, EXTENDED RELEASE ORAL
Qty: 90 TABLET | Refills: 3 | Status: SHIPPED | OUTPATIENT
Start: 2017-08-02 | End: 2018-08-01 | Stop reason: SDUPTHER

## 2017-08-02 RX ORDER — RIVAROXABAN 20 MG/1
20 TABLET, FILM COATED ORAL NIGHTLY
Qty: 90 TABLET | Refills: 2 | Status: SHIPPED | OUTPATIENT
Start: 2017-08-02 | End: 2017-08-02 | Stop reason: SDUPTHER

## 2017-08-02 NOTE — PROGRESS NOTES
"Subjective:    Patient ID:  Gale Fernández is a 72 y.o. female who presents for follow-up of Atrial Fibrillation.     Gale Fernández is a patient of Dr. Stanley.    HPI     Ms. Fernández is a 72 y.o. 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  Ms. Fernández underwent an RFA in August of 2004; Typical AVNRT was induced >> RFA performed (single lesion); AT was induced (unclear significance) >> no RFA performed for this. Ms. Fernández ultimately developed recurrent palpitations, and an Event Monitor at the time revealed SVT at 130 bpm. Following this, she presented to clinic and was found to be in AF w/a controlled ventricular response. Ms. Fernández has a hx of noncompliance with her medical regimen; has changed medications and dosages on her own. She reportedly experienced fatigue and "low blood pressure" on flecainide.   At her office visit in September of 2015, Ms. Fernández agreed to resume dronedarone and Xarelto was initiated; she denied experiencing AF recurrence.  She did however, note back pain with radiation into her BLE and wondered whether one of her medications was contributing to her symptoms.  At her last office visit (07/21/16), Ms. Fernández reported feeling well overall with occasional baseline fatigue. She reported experiencing only 2 AF episodes during the preceding year; both of which lasted just several hours.     Since her last office visit, Ms. Fernández reports feeling well overall. She notes very rare palpitations, lasting at most 15 min, 1-2 x/year. She notes intermittent fatigue; ant with prolonged yard work; otherwise active without difficulty. She states that she has begun to experience an unusual sensation in her BLE over the last month; this feels as "though a fan is blowing on [her] legs;" she also reports associated bilateral soreness in the "back of [her] knees." She states that she occasionally notes numbness and tingling in " her feet at times, but denies edema, temperature changes, or skin discoloration.     Recent cardiac studies:  Echo (08/03/16) revealed an EF of 60-65%, GRADE I-II LVDD, and a PASP>20 mmHg.     I reviewed today's ECG which demonstrated SR at 60 bpm; , QRS 84, and QTc 466.    Review of Systems   Constitution: Positive for malaise/fatigue (intermittent). Negative for diaphoresis.   HENT: Negative for headaches and nosebleeds.    Eyes: Negative for double vision.   Cardiovascular: Positive for palpitations (1-2 short-lived episodes/year; stable). Negative for chest pain, dyspnea on exertion, irregular heartbeat, leg swelling, near-syncope and syncope.   Respiratory: Negative for shortness of breath.    Skin: Negative.    Musculoskeletal: Negative.    Gastrointestinal: Negative for abdominal pain, hematemesis and hematochezia.   Genitourinary: Negative for hematuria.   Neurological: Positive for numbness and sensory change. Negative for dizziness and light-headedness.   Psychiatric/Behavioral: Negative for altered mental status.        Objective:    Physical Exam   Constitutional: She is oriented to person, place, and time. She appears well-developed and well-nourished.   HENT:   Head: Normocephalic and atraumatic.   Eyes: Pupils are equal, round, and reactive to light.   Cardiovascular: Normal rate, regular rhythm, normal heart sounds and intact distal pulses.    Pulmonary/Chest: Effort normal and breath sounds normal.   Musculoskeletal: Normal range of motion.   Neurological: She is alert and oriented to person, place, and time.   Skin: She is not diaphoretic.   Vitals reviewed.        Assessment:       1. Paroxysmal atrial fibrillation    2. Visit for monitoring Multaq therapy    3. Current use of long term anticoagulation    4. Palpitations    5. SVT (supraventricular tachycardia)    6. Borderline hyperlipidemia    7. History of hypertension         Plan:       In summary, Ms. Fernández is a 72 y.o. 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 Ms. Fernández is doing well from a rhythm perspective; rhythm-controlled on Multaq and anticoagulated on Xarelto.     Continue current medication regimen.   Follow up with PCP for continued LE sensory changes.   Follow up in clinic in 6 months, sooner as needed.     Martha Velasquez, MN, APRN, FNP-C      Case discussed with Dr. Stanley who agrees with the aforementioned plan.

## 2017-12-15 ENCOUNTER — OFFICE VISIT (OUTPATIENT)
Dept: FAMILY MEDICINE | Facility: CLINIC | Age: 72
End: 2017-12-15
Payer: COMMERCIAL

## 2017-12-15 VITALS
HEART RATE: 72 BPM | HEIGHT: 65 IN | SYSTOLIC BLOOD PRESSURE: 152 MMHG | OXYGEN SATURATION: 96 % | DIASTOLIC BLOOD PRESSURE: 86 MMHG | TEMPERATURE: 98 F | BODY MASS INDEX: 31.33 KG/M2 | WEIGHT: 188.06 LBS

## 2017-12-15 DIAGNOSIS — Z12.11 COLON CANCER SCREENING: ICD-10-CM

## 2017-12-15 DIAGNOSIS — F41.9 ANXIETY: ICD-10-CM

## 2017-12-15 DIAGNOSIS — N39.0 RECURRENT UTI: ICD-10-CM

## 2017-12-15 DIAGNOSIS — I48.0 PAROXYSMAL ATRIAL FIBRILLATION: Primary | ICD-10-CM

## 2017-12-15 LAB
BILIRUB SERPL-MCNC: NEGATIVE MG/DL
BLOOD URINE, POC: ABNORMAL
COLOR, POC UA: YELLOW
GLUCOSE UR QL STRIP: NORMAL
KETONES UR QL STRIP: NEGATIVE
LEUKOCYTE ESTERASE URINE, POC: ABNORMAL
NITRITE, POC UA: POSITIVE
PH, POC UA: 7
PROTEIN, POC: ABNORMAL
SPECIFIC GRAVITY, POC UA: 1.01
UROBILINOGEN, POC UA: NORMAL

## 2017-12-15 PROCEDURE — 81001 URINALYSIS AUTO W/SCOPE: CPT | Mod: S$GLB,,, | Performed by: FAMILY MEDICINE

## 2017-12-15 PROCEDURE — 99214 OFFICE O/P EST MOD 30 MIN: CPT | Mod: 25,S$GLB,, | Performed by: FAMILY MEDICINE

## 2017-12-15 PROCEDURE — 99999 PR PBB SHADOW E&M-EST. PATIENT-LVL III: CPT | Mod: PBBFAC,,, | Performed by: FAMILY MEDICINE

## 2017-12-15 RX ORDER — ALPRAZOLAM 0.25 MG/1
TABLET ORAL
Qty: 30 TABLET | Refills: 1 | Status: SHIPPED | OUTPATIENT
Start: 2017-12-15 | End: 2019-06-26 | Stop reason: SDUPTHER

## 2017-12-15 RX ORDER — NITROFURANTOIN (MACROCRYSTALS) 100 MG/1
100 CAPSULE ORAL EVERY 12 HOURS
Qty: 14 CAPSULE | Refills: 1 | Status: SHIPPED | OUTPATIENT
Start: 2017-12-15 | End: 2018-02-14 | Stop reason: ALTCHOICE

## 2017-12-15 NOTE — LETTER
December 15, 2017      Gale Fernández   [address] [address]             [place] [facility] [address] [place] [address]  Phone: [phone]  Fax: [phone] Gale Fernández    Was treated here on 12/15/2017    May Return to work/school on 12/18/2017.    No Restrictions              Axel Waller MD

## 2017-12-15 NOTE — PROGRESS NOTES
Chief Complaint   Patient presents with    Urinary Tract Infection       HPI  Gale Fernández is a 72 y.o. female with multiple medical diagnoses as listed in the medical history and problem list that presents for UTI.    UTI - +dysuria, +pelvic pressure, +frequency, meds: hydration and cranberry juice.     Pt is known to me and was last seen by me on Visit date not found.    PAST MEDICAL HISTORY:  Past Medical History:   Diagnosis Date    Atrial fibrillation     Followed by EP cardiology    Borderline hyperlipidemia     History of abnormal Pap smear     More than 20 years ago; status post colposcopy    History of anxiety disorder     History of hypertension     Currently doing okay off medication    History of ventricular tachycardia     (PSVT) Status post ablation    Osteopenia     Refuses medication    Overweight(278.02)        PAST SURGICAL HISTORY:  Past Surgical History:   Procedure Laterality Date     SECTION, LOW TRANSVERSE      X1    left knee meniscus surgery  2012    neck tuck and liposuction      PSVT ablation      Skin cancer removal on the left eye         SOCIAL HISTORY:  Social History     Social History    Marital status:      Spouse name: N/A    Number of children: 1    Years of education: N/A     Occupational History    Teacher      Social History Main Topics    Smoking status: Former Smoker     Types: Cigarettes    Smokeless tobacco: Never Used    Alcohol use Yes      Comment: Rarely    Drug use: No    Sexual activity: Not on file     Other Topics Concern    Not on file     Social History Narrative    No narrative on file       FAMILY HISTORY:  Family History   Problem Relation Age of Onset    Heart disease Mother     Cancer Father      nasopharynx    Breast cancer Cousin     Hypertension Sister     Diabetes Sister     Coronary artery disease Brother      s/p stenting    Hypertension Brother     Rheum arthritis Sister     Hypertension Brother      Hypertension Brother     Heart disease Brother      s/p CABG    Hypertension Brother     Coronary artery disease Brother      s/p stenting    Colon cancer Neg Hx        ALLERGIES AND MEDICATIONS: updated and reviewed.  Review of patient's allergies indicates:   Allergen Reactions    Flecainide      Other reaction(s): worsening arrythmia     Current Outpatient Prescriptions   Medication Sig Dispense Refill    ALPRAZolam (XANAX) 0.25 MG tablet TAKE 1 TABLET BY MOUTH AT BEDTIME AS NEEDED FOR ANXIETRY 30 tablet 1    dronedarone (MULTAQ) 400 mg Tab TAKE 1 TABLET (400 MG TOTAL) BY MOUTH 2 (TWO) TIMES DAILY WITH MEALS. 60 tablet 10    lidocaine HCL 4% (XYLOCAINE) 4 % (40 mg/mL) external solution Apply 4 mLs topically as needed. 50 mL 2    lidocaine-prilocaine (EMLA) cream Apply topically as needed. 25 g 0    metoprolol succinate (TOPROL-XL) 25 MG 24 hr tablet TAKE 1 TABLET (25 MG TOTAL) BY MOUTH ONCE DAILY. 90 tablet 3    rivaroxaban (XARELTO) 20 mg Tab Take 1 tablet (20 mg total) by mouth every evening. 30 tablet 11    TAZORAC 0.1 % cream APPLY TO AFFECTED AREA EVERY DAY AT BEDTIME  6    fluticasone (FLONASE) 50 mcg/actuation nasal spray 2 sprays by Each Nare route once daily. 16 g 0    levocetirizine (XYZAL) 5 MG tablet Take 1 tablet (5 mg total) by mouth every evening. 30 tablet 1    nitrofurantoin (MACRODANTIN) 100 MG capsule Take 1 capsule (100 mg total) by mouth every 12 (twelve) hours. 14 capsule 1    nitrofurantoin, macrocrystal-monohydrate, (MACROBID) 100 MG capsule Take postcoital 30 capsule 0     No current facility-administered medications for this visit.        ROS  Review of Systems   Constitutional: Negative for activity change, appetite change and fever.   HENT: Negative for congestion and sore throat.    Eyes: Negative for visual disturbance.   Respiratory: Negative for cough and shortness of breath.    Cardiovascular: Negative for chest pain.   Gastrointestinal: Negative for abdominal  "pain, diarrhea, nausea and vomiting.   Endocrine: Negative.    Genitourinary: Positive for dysuria, frequency and urgency.   Musculoskeletal: Negative for arthralgias and back pain.   Skin: Negative for rash.   Allergic/Immunologic: Negative.    Neurological: Negative for dizziness, weakness and headaches.   Hematological: Negative.    Psychiatric/Behavioral: Negative for agitation and confusion.       Physical Exam  Vitals:    12/15/17 1621   BP: (!) 152/86   Pulse: 72   Temp: 98.2 °F (36.8 °C)    Body mass index is 31.29 kg/m².  Weight: 85.3 kg (188 lb 0.8 oz)   Height: 5' 5" (165.1 cm)     Physical Exam   Constitutional: She is oriented to person, place, and time. She appears well-developed and well-nourished.   HENT:   Head: Normocephalic.   Neurological: She is alert and oriented to person, place, and time.   Psychiatric: She has a normal mood and affect. Her behavior is normal. Judgment and thought content normal.       Health Maintenance       Date Due Completion Date    Fecal Occult Blood Test (FOBT)/[facility] 09/22/2015 9/22/2014    Pneumococcal (65+) (2 of 2 - PPSV23) 06/20/2017 6/20/2016    Influenza Vaccine 08/01/2017 9/9/2016 (Declined)    Override on 9/9/2016: Declined    Override on 9/25/2015: Declined    Override on 3/10/2015: Declined    Mammogram 10/13/2017 10/13/2016    DEXA SCAN 10/13/2018 10/13/2016    Override on 7/7/2011: Done    Lipid Panel 08/20/2021 8/20/2016    TETANUS VACCINE 06/06/2026 6/6/2016          Assessment & Plan    Paroxysmal atrial fibrillation  - Continue current medication regimen as prescribed  - Cont follow up with Cardiology    Anxiety  -     ALPRAZolam (XANAX) 0.25 MG tablet; TAKE 1 TABLET BY MOUTH AT BEDTIME AS NEEDED FOR ANXIETRY  Dispense: 30 tablet; Refill: 1  - Continue current medication regimen as prescribed    Recurrent UTI  -     nitrofurantoin (MACRODANTIN) 100 MG capsule; Take 1 capsule (100 mg total) by mouth every 12 (twelve) hours.  Dispense: 14 capsule; " Refill: 1  -     POCT urinalysis, dipstick or tablet reag  - Unfortunately unable to culture due to lab closing.     Colon cancer screening  -     Fecal Immunochemical Test (iFOBT); Future; Expected date: 12/15/2017      Return in about 3 months (around 3/15/2018).

## 2017-12-24 ENCOUNTER — PATIENT MESSAGE (OUTPATIENT)
Dept: ADMINISTRATIVE | Facility: OTHER | Age: 72
End: 2017-12-24

## 2017-12-26 ENCOUNTER — LAB VISIT (OUTPATIENT)
Dept: LAB | Facility: HOSPITAL | Age: 72
End: 2017-12-26
Attending: FAMILY MEDICINE
Payer: COMMERCIAL

## 2017-12-26 DIAGNOSIS — Z12.11 COLON CANCER SCREENING: ICD-10-CM

## 2017-12-26 PROCEDURE — 82274 ASSAY TEST FOR BLOOD FECAL: CPT

## 2017-12-27 LAB — HEMOCCULT STL QL IA: NEGATIVE

## 2017-12-31 ENCOUNTER — OFFICE VISIT (OUTPATIENT)
Dept: URGENT CARE | Facility: CLINIC | Age: 72
End: 2017-12-31
Payer: COMMERCIAL

## 2017-12-31 VITALS
WEIGHT: 188 LBS | DIASTOLIC BLOOD PRESSURE: 87 MMHG | HEART RATE: 96 BPM | BODY MASS INDEX: 31.32 KG/M2 | HEIGHT: 65 IN | SYSTOLIC BLOOD PRESSURE: 131 MMHG | OXYGEN SATURATION: 97 % | TEMPERATURE: 99 F

## 2017-12-31 DIAGNOSIS — R05.9 COUGH: ICD-10-CM

## 2017-12-31 DIAGNOSIS — R19.7 DIARRHEA, UNSPECIFIED TYPE: ICD-10-CM

## 2017-12-31 DIAGNOSIS — B34.9 VIRAL SYNDROME: Primary | ICD-10-CM

## 2017-12-31 PROCEDURE — 99214 OFFICE O/P EST MOD 30 MIN: CPT | Mod: S$GLB,,, | Performed by: PHYSICIAN ASSISTANT

## 2017-12-31 RX ORDER — DOXYCYCLINE 100 MG/1
100 CAPSULE ORAL 2 TIMES DAILY
Qty: 20 CAPSULE | Refills: 0 | Status: SHIPPED | OUTPATIENT
Start: 2018-01-03 | End: 2018-01-13

## 2017-12-31 RX ORDER — BENZONATATE 100 MG/1
200 CAPSULE ORAL 3 TIMES DAILY PRN
Qty: 40 CAPSULE | Refills: 0 | Status: SHIPPED | OUTPATIENT
Start: 2017-12-31 | End: 2018-02-14

## 2017-12-31 NOTE — PROGRESS NOTES
"Subjective:       Patient ID: Gale Fernández is a 72 y.o. female.    Vitals:  height is 5' 5" (1.651 m) and weight is 85.3 kg (188 lb). Her oral temperature is 98.5 °F (36.9 °C). Her blood pressure is 131/87 and her pulse is 96. Her oxygen saturation is 97%.     Chief Complaint: No chief complaint on file.    Symptoms since Tuesday. Pt states she has not taken her meds in a few days.      Influenza   This is a new problem. The current episode started in the past 7 days. The problem occurs constantly. The problem has been unchanged. Associated symptoms include congestion, coughing, a fever, headaches and myalgias. Pertinent negatives include no abdominal pain, chest pain, chills, nausea or sore throat.     Review of Systems   Constitution: Positive for fever. Negative for chills and malaise/fatigue.   HENT: Positive for congestion. Negative for ear pain, hoarse voice and sore throat.    Eyes: Negative for discharge and redness.   Cardiovascular: Negative for chest pain, dyspnea on exertion and leg swelling.   Respiratory: Positive for cough. Negative for shortness of breath, sputum production and wheezing.    Musculoskeletal: Positive for myalgias.   Gastrointestinal: Negative for abdominal pain and nausea.   Neurological: Positive for headaches.       Objective:      Physical Exam   Constitutional: She is oriented to person, place, and time. She appears well-developed and well-nourished. She is cooperative.  Non-toxic appearance. She does not appear ill. No distress.   HENT:   Head: Normocephalic and atraumatic.   Right Ear: Hearing, tympanic membrane, external ear and ear canal normal.   Left Ear: Hearing, tympanic membrane, external ear and ear canal normal.   Nose: Rhinorrhea present. No mucosal edema or nasal deformity. No epistaxis. Right sinus exhibits no maxillary sinus tenderness and no frontal sinus tenderness. Left sinus exhibits no maxillary sinus tenderness and no frontal sinus tenderness.   Mouth/Throat: " Uvula is midline, oropharynx is clear and moist and mucous membranes are normal. No trismus in the jaw. Normal dentition. No uvula swelling. No posterior oropharyngeal erythema.   Eyes: Conjunctivae and lids are normal. No scleral icterus.   Sclera clear bilat   Neck: Trachea normal, full passive range of motion without pain and phonation normal. Neck supple.   Cardiovascular: Normal rate, normal heart sounds, intact distal pulses and normal pulses.  A regularly irregular rhythm present.   Pulmonary/Chest: Effort normal and breath sounds normal. No respiratory distress.   Abdominal: Soft. Normal appearance and bowel sounds are normal. She exhibits no distension. There is no tenderness. There is no rigidity, no rebound, no guarding and no CVA tenderness.   Musculoskeletal: Normal range of motion. She exhibits no edema or deformity.   Neurological: She is alert and oriented to person, place, and time. She exhibits normal muscle tone. Coordination normal.   Skin: Skin is warm, dry and intact. She is not diaphoretic. No pallor.   Psychiatric: She has a normal mood and affect. Her speech is normal and behavior is normal. Judgment and thought content normal. Cognition and memory are normal.   Nursing note and vitals reviewed.      Assessment:       1. Viral syndrome    2. Cough    3. Diarrhea, unspecified type        Plan:         Viral syndrome  -     doxycycline (VIBRAMYCIN) 100 MG Cap; Take 1 capsule (100 mg total) by mouth 2 (two) times daily.  Dispense: 20 capsule; Refill: 0    Cough  -     benzonatate (TESSALON PERLES) 100 MG capsule; Take 2 capsules (200 mg total) by mouth 3 (three) times daily as needed for Cough.  Dispense: 40 capsule; Refill: 0    Diarrhea, unspecified type        Viral Syndrome (Adult)  A viral illness may cause a number of symptoms. The symptoms depend on the part of the body that the virus affects. If it settles in your nose, throat, and lungs, it may cause cough, sore throat, congestion, and  "sometimes headache. If it settles in your stomach and intestinal tract, it may cause vomiting and diarrhea. Sometimes it causes vague symptoms like "aching all over," feeling tired, loss of appetite, or fever.  A viral illness usually lasts 1 to 2 weeks, but sometimes it lasts longer. In some cases, a more serious infection can look like a viral syndrome in the first few days of the illness. You may need another exam and additional tests to know the difference. Watch for the warning signs listed below.  Home care  Follow these guidelines for taking care of yourself at home:  · If symptoms are severe, rest at home for the first 2 to 3 days.  · Stay away from cigarette smoke - both your smoke and the smoke from others.  · You may use over-the-counter acetaminophen or ibuprofen for fever, muscle aching, and headache, unless another medicine was prescribed for this. If you have chronic liver or kidney disease or ever had a stomach ulcer or GI bleeding, talk with your doctor before using these medicines. No one who is younger than 18 and ill with a fever should take aspirin. It may cause severe disease or death.  · Your appetite may be poor, so a light diet is fine. Avoid dehydration by drinking 8 to 12 8-ounce glasses of fluids each day. This may include water; orange juice; lemonade; apple, grape, and cranberry juice; clear fruit drinks; electrolyte replacement and sports drinks; and decaffeinated teas and coffee. If you have been diagnosed with a kidney disease, ask your doctor how much and what types of fluids you should drink to prevent dehydration. If you have kidney disease, drinking too much fluid can cause it build up in the your body and be dangerous to your health.  · Over-the-counter remedies won't shorten the length of the illness but may be helpful for cough, sore throat; and nasal and sinus congestion. Don't use decongestants if you have high blood pressure.  Follow-up care  Follow up with your healthcare " provider if you do not improve over the next week.  Call 911  Get emergency medical care if any of the following occur:  · Convulsion  · Feeling weak, dizzy, or like you are going to faint  · Chest pain, shortness of breath, wheezing, or difficulty breathing  When to seek medical advice  Call your healthcare provider right away if any of these occur:  · Cough with lots of colored sputum (mucus) or blood in your sputum  · Chest pain, shortness of breath, wheezing, or difficulty breathing  · Severe headache; face, neck, or ear pain  · Severe, constant pain in the lower right side of your belly (abdominal)  · Continued vomiting (cant keep liquids down)  · Frequent diarrhea (more than 5 times a day); blood (red or black color) or mucus in diarrhea  · Feeling weak, dizzy, or like you are going to faint  · Extreme thirst  · Fever of 100.4°F (38°C) or higher, or as directed by your healthcare provider  Date Last Reviewed: 9/25/2015  © 8284-8605 [employer]. [address] [place] [address]. All rights reserved. This information is not intended as a substitute for professional medical care. Always follow your healthcare professional's instructions.      DISCUSSED IMPORTANCE OF TAKING CURRENT Rx. PATIENT VERBALIZED UNDERSTANDING.     Please follow up with your Primary care provider within 2-5 days if your signs and symptoms have not resolved or worsen.     If your condition worsens or fails to improve we recommend that you receive another evaluation at the emergency room immediately or contact your primary medical clinic to discuss your concerns.   You must understand that you have received an Urgent Care treatment only and that you may be released before all of your medical problems are known or treated. You, the patient, will arrange for follow up care as instructed.

## 2017-12-31 NOTE — PATIENT INSTRUCTIONS
"  Viral Syndrome (Adult)  A viral illness may cause a number of symptoms. The symptoms depend on the part of the body that the virus affects. If it settles in your nose, throat, and lungs, it may cause cough, sore throat, congestion, and sometimes headache. If it settles in your stomach and intestinal tract, it may cause vomiting and diarrhea. Sometimes it causes vague symptoms like "aching all over," feeling tired, loss of appetite, or fever.  A viral illness usually lasts 1 to 2 weeks, but sometimes it lasts longer. In some cases, a more serious infection can look like a viral syndrome in the first few days of the illness. You may need another exam and additional tests to know the difference. Watch for the warning signs listed below.  Home care  Follow these guidelines for taking care of yourself at home:  · If symptoms are severe, rest at home for the first 2 to 3 days.  · Stay away from cigarette smoke - both your smoke and the smoke from others.  · You may use over-the-counter acetaminophen or ibuprofen for fever, muscle aching, and headache, unless another medicine was prescribed for this. If you have chronic liver or kidney disease or ever had a stomach ulcer or GI bleeding, talk with your doctor before using these medicines. No one who is younger than 18 and ill with a fever should take aspirin. It may cause severe disease or death.  · Your appetite may be poor, so a light diet is fine. Avoid dehydration by drinking 8 to 12 8-ounce glasses of fluids each day. This may include water; orange juice; lemonade; apple, grape, and cranberry juice; clear fruit drinks; electrolyte replacement and sports drinks; and decaffeinated teas and coffee. If you have been diagnosed with a kidney disease, ask your doctor how much and what types of fluids you should drink to prevent dehydration. If you have kidney disease, drinking too much fluid can cause it build up in the your body and be dangerous to your " health.  · Over-the-counter remedies won't shorten the length of the illness but may be helpful for cough, sore throat; and nasal and sinus congestion. Don't use decongestants if you have high blood pressure.  Follow-up care  Follow up with your healthcare provider if you do not improve over the next week.  Call 911  Get emergency medical care if any of the following occur:  · Convulsion  · Feeling weak, dizzy, or like you are going to faint  · Chest pain, shortness of breath, wheezing, or difficulty breathing  When to seek medical advice  Call your healthcare provider right away if any of these occur:  · Cough with lots of colored sputum (mucus) or blood in your sputum  · Chest pain, shortness of breath, wheezing, or difficulty breathing  · Severe headache; face, neck, or ear pain  · Severe, constant pain in the lower right side of your belly (abdominal)  · Continued vomiting (cant keep liquids down)  · Frequent diarrhea (more than 5 times a day); blood (red or black color) or mucus in diarrhea  · Feeling weak, dizzy, or like you are going to faint  · Extreme thirst  · Fever of 100.4°F (38°C) or higher, or as directed by your healthcare provider  Date Last Reviewed: 9/25/2015  © 1873-3516 [employer]. [address] [place] [address], [place] [address]. All rights reserved. This information is not intended as a substitute for professional medical care. Always follow your healthcare professional's instructions.      DISCUSSED IMPORTANCE OF TAKING CURRENT Rx. PATIENT VERBALIZED UNDERSTANDING.     Please follow up with your Primary care provider within 2-5 days if your signs and symptoms have not resolved or worsen.     If your condition worsens or fails to improve we recommend that you receive another evaluation at the emergency room immediately or contact your primary medical clinic to discuss your concerns.   You must understand that you have received an Urgent Care treatment only and that you may be released  before all of your medical problems are known or treated. You, the patient, will arrange for follow up care as instructed.

## 2018-02-07 ENCOUNTER — TELEPHONE (OUTPATIENT)
Dept: FAMILY MEDICINE | Facility: CLINIC | Age: 73
End: 2018-02-07

## 2018-02-07 DIAGNOSIS — Z00.00 ANNUAL PHYSICAL EXAM: Primary | ICD-10-CM

## 2018-02-07 NOTE — TELEPHONE ENCOUNTER
Patient scheduled 2/14/18 requesting for labs to be completed prior to , cbc, cmp and lipid pended, any other labs to include, please advise, thank you.

## 2018-02-07 NOTE — TELEPHONE ENCOUNTER
----- Message from Leslee Hall sent at 2/7/2018  7:56 AM CST -----  Contact: self  Pt calling to schedule annual labs. Please call [phone].

## 2018-02-12 ENCOUNTER — LAB VISIT (OUTPATIENT)
Dept: LAB | Facility: HOSPITAL | Age: 73
End: 2018-02-12
Attending: FAMILY MEDICINE
Payer: COMMERCIAL

## 2018-02-12 DIAGNOSIS — Z00.00 ANNUAL PHYSICAL EXAM: ICD-10-CM

## 2018-02-12 LAB
ALBUMIN SERPL BCP-MCNC: 3.4 G/DL
ALP SERPL-CCNC: 51 U/L
ALT SERPL W/O P-5'-P-CCNC: 15 U/L
ANION GAP SERPL CALC-SCNC: 6 MMOL/L
AST SERPL-CCNC: 21 U/L
BASOPHILS # BLD AUTO: 0.03 K/UL
BASOPHILS NFR BLD: 0.5 %
BILIRUB SERPL-MCNC: 0.5 MG/DL
BUN SERPL-MCNC: 22 MG/DL
CALCIUM SERPL-MCNC: 9.2 MG/DL
CHLORIDE SERPL-SCNC: 107 MMOL/L
CHOLEST SERPL-MCNC: 188 MG/DL
CHOLEST/HDLC SERPL: 4.2 {RATIO}
CO2 SERPL-SCNC: 28 MMOL/L
CREAT SERPL-MCNC: 0.7 MG/DL
DIFFERENTIAL METHOD: ABNORMAL
EOSINOPHIL # BLD AUTO: 0.3 K/UL
EOSINOPHIL NFR BLD: 4.6 %
ERYTHROCYTE [DISTWIDTH] IN BLOOD BY AUTOMATED COUNT: 12.9 %
EST. GFR  (AFRICAN AMERICAN): >60 ML/MIN/1.73 M^2
EST. GFR  (NON AFRICAN AMERICAN): >60 ML/MIN/1.73 M^2
GLUCOSE SERPL-MCNC: 94 MG/DL
HCT VFR BLD AUTO: 41.2 %
HDLC SERPL-MCNC: 45 MG/DL
HDLC SERPL: 23.9 %
HGB BLD-MCNC: 13.1 G/DL
IMM GRANULOCYTES # BLD AUTO: 0.01 K/UL
IMM GRANULOCYTES NFR BLD AUTO: 0.2 %
LDLC SERPL CALC-MCNC: 125.6 MG/DL
LYMPHOCYTES # BLD AUTO: 2.1 K/UL
LYMPHOCYTES NFR BLD: 32.5 %
MCH RBC QN AUTO: 30.1 PG
MCHC RBC AUTO-ENTMCNC: 31.8 G/DL
MCV RBC AUTO: 95 FL
MONOCYTES # BLD AUTO: 0.4 K/UL
MONOCYTES NFR BLD: 6.5 %
NEUTROPHILS # BLD AUTO: 3.5 K/UL
NEUTROPHILS NFR BLD: 55.7 %
NONHDLC SERPL-MCNC: 143 MG/DL
NRBC BLD-RTO: 0 /100 WBC
PLATELET # BLD AUTO: 224 K/UL
PMV BLD AUTO: 10.1 FL
POTASSIUM SERPL-SCNC: 4.8 MMOL/L
PROT SERPL-MCNC: 6.8 G/DL
RBC # BLD AUTO: 4.35 M/UL
SODIUM SERPL-SCNC: 141 MMOL/L
T4 FREE SERPL-MCNC: 1.02 NG/DL
TRIGL SERPL-MCNC: 87 MG/DL
TSH SERPL DL<=0.005 MIU/L-ACNC: 1.11 UIU/ML
WBC # BLD AUTO: 6.31 K/UL

## 2018-02-12 PROCEDURE — 36415 COLL VENOUS BLD VENIPUNCTURE: CPT | Mod: PO

## 2018-02-12 PROCEDURE — 85025 COMPLETE CBC W/AUTO DIFF WBC: CPT

## 2018-02-12 PROCEDURE — 84443 ASSAY THYROID STIM HORMONE: CPT

## 2018-02-12 PROCEDURE — 84439 ASSAY OF FREE THYROXINE: CPT

## 2018-02-12 PROCEDURE — 80053 COMPREHEN METABOLIC PANEL: CPT

## 2018-02-12 PROCEDURE — 80061 LIPID PANEL: CPT

## 2018-02-14 ENCOUNTER — OFFICE VISIT (OUTPATIENT)
Dept: FAMILY MEDICINE | Facility: CLINIC | Age: 73
End: 2018-02-14
Payer: COMMERCIAL

## 2018-02-14 VITALS
HEIGHT: 65 IN | TEMPERATURE: 98 F | OXYGEN SATURATION: 97 % | HEART RATE: 71 BPM | SYSTOLIC BLOOD PRESSURE: 140 MMHG | DIASTOLIC BLOOD PRESSURE: 92 MMHG | BODY MASS INDEX: 29.61 KG/M2 | WEIGHT: 177.69 LBS | RESPIRATION RATE: 20 BRPM

## 2018-02-14 DIAGNOSIS — Z00.00 ANNUAL PHYSICAL EXAM: ICD-10-CM

## 2018-02-14 DIAGNOSIS — Z12.39 BREAST CANCER SCREENING: Primary | ICD-10-CM

## 2018-02-14 DIAGNOSIS — Z23 NEED FOR VACCINATION: ICD-10-CM

## 2018-02-14 DIAGNOSIS — Z79.01 CURRENT USE OF LONG TERM ANTICOAGULATION: ICD-10-CM

## 2018-02-14 DIAGNOSIS — I48.0 PAROXYSMAL ATRIAL FIBRILLATION: ICD-10-CM

## 2018-02-14 PROCEDURE — 90732 PPSV23 VACC 2 YRS+ SUBQ/IM: CPT | Mod: S$GLB,,, | Performed by: FAMILY MEDICINE

## 2018-02-14 PROCEDURE — 90471 IMMUNIZATION ADMIN: CPT | Mod: S$GLB,,, | Performed by: FAMILY MEDICINE

## 2018-02-14 PROCEDURE — 99397 PER PM REEVAL EST PAT 65+ YR: CPT | Mod: 25,S$GLB,, | Performed by: FAMILY MEDICINE

## 2018-02-14 PROCEDURE — 99999 PR PBB SHADOW E&M-EST. PATIENT-LVL III: CPT | Mod: PBBFAC,,, | Performed by: FAMILY MEDICINE

## 2018-02-14 PROCEDURE — 90472 IMMUNIZATION ADMIN EACH ADD: CPT | Mod: S$GLB,,, | Performed by: FAMILY MEDICINE

## 2018-02-14 PROCEDURE — 90662 IIV NO PRSV INCREASED AG IM: CPT | Mod: S$GLB,,, | Performed by: FAMILY MEDICINE

## 2018-02-14 NOTE — PROGRESS NOTES
Pt tolerated flu vaccine to left deltoid without difficulty; no adverse reaction noted; VIS given; pt tolerated pneumococcal 23 vaccine to left deltoid without difficulty; no adverse reaction noted; VIS given

## 2018-02-14 NOTE — PROGRESS NOTES
Chief Complaint   Patient presents with    Annual Exam       HPI  Gale Fernández is a 72 y.o. female with multiple medical diagnoses as listed in the medical history and problem list that presents for annual exam.      Annual exam     States every day 9 am, states feels weak, +snack, then 30 min later will improve. States does not eat breakfast, +coffee only (black).  Few year hx overall.     Chronic afib - currnetly on xarelto, multaq, overall doing well.     Pt is known to me and was last seen by me on 12/15/2017.    PAST MEDICAL HISTORY:  Past Medical History:   Diagnosis Date    Atrial fibrillation     Followed by EP cardiology    Borderline hyperlipidemia     History of abnormal Pap smear     More than 20 years ago; status post colposcopy    History of anxiety disorder     History of hypertension     Currently doing okay off medication    History of ventricular tachycardia     (PSVT) Status post ablation    Osteopenia     Refuses medication    Overweight(278.02)        PAST SURGICAL HISTORY:  Past Surgical History:   Procedure Laterality Date     SECTION, LOW TRANSVERSE      X1    left knee meniscus surgery  2012    neck tuck and liposuction  2012    PSVT ablation      Skin cancer removal on the left eye         SOCIAL HISTORY:  Social History     Social History    Marital status:      Spouse name: N/A    Number of children: 1    Years of education: N/A     Occupational History    Teacher      Social History Main Topics    Smoking status: Former Smoker     Types: Cigarettes    Smokeless tobacco: Never Used    Alcohol use Yes      Comment: Rarely    Drug use: No    Sexual activity: Not on file     Other Topics Concern    Not on file     Social History Narrative    No narrative on file       FAMILY HISTORY:  Family History   Problem Relation Age of Onset    Heart disease Mother     Cancer Father      nasopharynx    Breast cancer Cousin     Hypertension Sister      Diabetes Sister     Coronary artery disease Brother      s/p stenting    Hypertension Brother     Rheum arthritis Sister     Hypertension Brother     Hypertension Brother     Heart disease Brother      s/p CABG    Hypertension Brother     Coronary artery disease Brother      s/p stenting    Colon cancer Neg Hx        ALLERGIES AND MEDICATIONS: updated and reviewed.  Review of patient's allergies indicates:   Allergen Reactions    Flecainide      Other reaction(s): worsening arrythmia     Current Outpatient Prescriptions   Medication Sig Dispense Refill    ALPRAZolam (XANAX) 0.25 MG tablet TAKE 1 TABLET BY MOUTH AT BEDTIME AS NEEDED FOR ANXIETRY 30 tablet 1    lidocaine-prilocaine (EMLA) cream Apply topically as needed. 25 g 0    metoprolol succinate (TOPROL-XL) 25 MG 24 hr tablet TAKE 1 TABLET (25 MG TOTAL) BY MOUTH ONCE DAILY. 90 tablet 3    rivaroxaban (XARELTO) 20 mg Tab Take 1 tablet (20 mg total) by mouth every evening. 30 tablet 11    TAZORAC 0.1 % cream APPLY TO AFFECTED AREA EVERY DAY AT BEDTIME  6    dronedarone (MULTAQ) 400 mg Tab TAKE 1 TABLET (400 MG TOTAL) BY MOUTH 2 (TWO) TIMES DAILY WITH MEALS. 60 tablet 10    levocetirizine (XYZAL) 5 MG tablet Take 1 tablet (5 mg total) by mouth every evening. 30 tablet 1    lidocaine HCL 4% (XYLOCAINE) 4 % (40 mg/mL) external solution Apply 4 mLs topically as needed. 50 mL 2     No current facility-administered medications for this visit.        ROS  Review of Systems   Constitutional: Negative for activity change, appetite change, fever and unexpected weight change.   HENT: Negative for congestion, hearing loss, rhinorrhea, sore throat and trouble swallowing.    Eyes: Negative for discharge and visual disturbance.   Respiratory: Negative for cough, chest tightness, shortness of breath and wheezing.    Cardiovascular: Negative for chest pain and palpitations.   Gastrointestinal: Negative for abdominal pain, blood in stool, constipation, diarrhea,  "nausea and vomiting.   Endocrine: Negative.  Negative for polydipsia and polyuria.   Genitourinary: Negative for difficulty urinating, dysuria, hematuria and menstrual problem.   Musculoskeletal: Negative for arthralgias, back pain, joint swelling and neck pain.   Skin: Negative for rash.   Allergic/Immunologic: Negative.    Neurological: Positive for headaches. Negative for dizziness and weakness.   Hematological: Negative.    Psychiatric/Behavioral: Negative for agitation, confusion and dysphoric mood.       Physical Exam  Vitals:    02/14/18 0740   BP: (!) 140/92   Pulse: 71   Resp: 20   Temp: 98 °F (36.7 °C)    Body mass index is 29.57 kg/m².  Weight: 80.6 kg (177 lb 11.1 oz)   Height: 5' 5" (165.1 cm)     Physical Exam   Constitutional: She is oriented to person, place, and time. She appears well-developed and well-nourished.   HENT:   Head: Normocephalic and atraumatic.   Eyes: Conjunctivae and EOM are normal. Pupils are equal, round, and reactive to light.   Neck: Normal range of motion. Neck supple.   Cardiovascular: Normal heart sounds.    Irregularly irregular   Pulmonary/Chest: Effort normal and breath sounds normal.   Abdominal: Soft. Bowel sounds are normal.   Musculoskeletal: Normal range of motion.   Neurological: She is alert and oriented to person, place, and time. She has normal reflexes.   Skin: Skin is warm and dry.   Psychiatric: She has a normal mood and affect. Her behavior is normal. Judgment and thought content normal.       Health Maintenance       Date Due Completion Date    Pneumococcal (65+) (2 of 2 - PPSV23) 06/20/2017 6/20/2016    Mammogram 10/13/2017 10/13/2016    DEXA SCAN 10/13/2018 10/13/2016    Override on 7/7/2011: Done    Fecal Occult Blood Test (FOBT)/FitKit 12/26/2018 12/26/2017    Lipid Panel 02/12/2023 2/12/2018    TETANUS VACCINE 06/06/2026 6/6/2016          Assessment & Plan    Breast cancer screening  -     Mammo Digital Screening Bilat with CAD; Future; Expected date: " 02/14/2018    Need for vaccination  -     Pneumococcal Polysaccharide Vaccine (23 Valent) (SQ/IM)  -     Influenza - High Dose (65+) (PF) (IM)    Paroxysmal atrial fibrillation, Current use of long term anticoagulation  - Continue current medication regimen as prescribed  - Cont follow up with Cardiology    Annual physical exam  - Counseled on age appropriate medical preventative services, including age appropriate cancer screenings, over all nutritional health, need for a consistent exercise regimen and an over all push towards maintaining a vigorous and active lifestyle.      - Counseled on age appropriate vaccines and discussed upcoming health care needs based on age/gender.  Spent time with patient counseling on need for a good patient/doctor relationship moving forward.  Discussed use of common OTC medications and supplements.  Discussed common dietary aids and use of caffeine and the need for good sleep hygiene and stress management.        Follow-up in about 4 weeks (around 3/14/2018), or if symptoms worsen or fail to improve.

## 2018-02-15 ENCOUNTER — TELEPHONE (OUTPATIENT)
Dept: FAMILY MEDICINE | Facility: CLINIC | Age: 73
End: 2018-02-15

## 2018-02-15 NOTE — TELEPHONE ENCOUNTER
----- Message from Darya Joe sent at 2/15/2018  1:33 PM CST -----  Contact: [phone]  Pt had a flu injection done yesterday and she started experiencing the allergic reaction about 3 yesterday evening Please call pt at your earliest convenience.  Thanks !

## 2018-02-16 ENCOUNTER — TELEPHONE (OUTPATIENT)
Dept: FAMILY MEDICINE | Facility: CLINIC | Age: 73
End: 2018-02-16

## 2018-02-16 DIAGNOSIS — N39.0 URINARY TRACT INFECTION WITHOUT HEMATURIA, SITE UNSPECIFIED: Primary | ICD-10-CM

## 2018-02-16 NOTE — TELEPHONE ENCOUNTER
Patient states that since having both the Influenza and pneumonia vaccines she's been having pain in the left arm rating 7/10 and loss of appetite. The day of the vaccines she was having chills which she is no longer having. Patient also states that she has been taking Aleve every 4 hours, patient was advised to only take OTC medications as directed. Please advise.

## 2018-02-16 NOTE — TELEPHONE ENCOUNTER
----- Message from Joaquin Alamo sent at 2/16/2018  8:02 AM CST -----  Contact: Self  Pt returned call. Pt can be reached  @ [phone].

## 2018-03-01 NOTE — TELEPHONE ENCOUNTER
Patient states that's she feels much better, but would rather not to have the pneumonia vaccine again. Patient is also  Requesting a script for Bactrim because she gets an UTI after sexual intercourse. Please advise.

## 2018-03-05 RX ORDER — SULFAMETHOXAZOLE AND TRIMETHOPRIM 800; 160 MG/1; MG/1
1 TABLET ORAL 2 TIMES DAILY
Qty: 14 TABLET | Refills: 0 | Status: SHIPPED | OUTPATIENT
Start: 2018-03-05 | End: 2018-06-28 | Stop reason: ALTCHOICE

## 2018-03-27 ENCOUNTER — TELEPHONE (OUTPATIENT)
Dept: FAMILY MEDICINE | Facility: CLINIC | Age: 73
End: 2018-03-27

## 2018-03-27 DIAGNOSIS — R53.83 FATIGUE, UNSPECIFIED TYPE: Primary | ICD-10-CM

## 2018-03-27 NOTE — TELEPHONE ENCOUNTER
----- Message from Brisa Fowler sent at 3/27/2018  1:16 PM CDT -----  Contact: self  Patient requesting A1c lab ordered at [phone].    Thanks!

## 2018-03-27 NOTE — TELEPHONE ENCOUNTER
Patient stated that she spoke with provider about having some weak spells in the morning, stated that the provider would order an a1c to check

## 2018-03-27 NOTE — TELEPHONE ENCOUNTER
----- Message from Lidia Alfaro sent at 3/27/2018  8:59 AM CDT -----  Contact: self  Pt is asking for orders for an A1C.    [phone].

## 2018-04-03 ENCOUNTER — LAB VISIT (OUTPATIENT)
Dept: LAB | Facility: HOSPITAL | Age: 73
End: 2018-04-03
Attending: FAMILY MEDICINE
Payer: COMMERCIAL

## 2018-04-03 DIAGNOSIS — R53.83 FATIGUE, UNSPECIFIED TYPE: ICD-10-CM

## 2018-04-03 LAB
ESTIMATED AVG GLUCOSE: 105 MG/DL
HBA1C MFR BLD HPLC: 5.3 %

## 2018-04-03 PROCEDURE — 83036 HEMOGLOBIN GLYCOSYLATED A1C: CPT

## 2018-04-03 PROCEDURE — 36415 COLL VENOUS BLD VENIPUNCTURE: CPT | Mod: PO

## 2018-06-08 DIAGNOSIS — I48.0 PAROXYSMAL ATRIAL FIBRILLATION: ICD-10-CM

## 2018-06-22 ENCOUNTER — HOSPITAL ENCOUNTER (OUTPATIENT)
Dept: RADIOLOGY | Facility: HOSPITAL | Age: 73
Discharge: HOME | End: 2018-06-22
Attending: FAMILY MEDICINE
Payer: COMMERCIAL

## 2018-06-22 DIAGNOSIS — Z12.39 BREAST CANCER SCREENING: ICD-10-CM

## 2018-06-22 PROCEDURE — 77067 SCR MAMMO BI INCL CAD: CPT | Mod: TC,PO

## 2018-06-22 PROCEDURE — 77063 BREAST TOMOSYNTHESIS BI: CPT | Mod: 26,,, | Performed by: RADIOLOGY

## 2018-06-22 PROCEDURE — 77067 SCR MAMMO BI INCL CAD: CPT | Mod: 26,,, | Performed by: RADIOLOGY

## 2018-06-25 ENCOUNTER — OFFICE VISIT (OUTPATIENT)
Dept: OPTOMETRY | Facility: CLINIC | Age: 73
End: 2018-06-25
Payer: COMMERCIAL

## 2018-06-25 DIAGNOSIS — Z01.00 ROUTINE EYE EXAM: Primary | ICD-10-CM

## 2018-06-25 DIAGNOSIS — H52.7 REFRACTIVE ERROR: ICD-10-CM

## 2018-06-25 DIAGNOSIS — H25.13 NUCLEAR SCLEROSIS OF BOTH EYES: ICD-10-CM

## 2018-06-25 DIAGNOSIS — Z46.0 ENCOUNTER FOR FITTING OR ADJUSTMENT OF SPECTACLES OR CONTACT LENSES: Primary | ICD-10-CM

## 2018-06-25 PROCEDURE — 92310 CONTACT LENS FITTING OU: CPT | Mod: S$GLB,,, | Performed by: OPTOMETRIST

## 2018-06-25 PROCEDURE — 92015 DETERMINE REFRACTIVE STATE: CPT | Mod: S$GLB,,, | Performed by: OPTOMETRIST

## 2018-06-25 PROCEDURE — 92004 COMPRE OPH EXAM NEW PT 1/>: CPT | Mod: S$GLB,,, | Performed by: OPTOMETRIST

## 2018-06-25 PROCEDURE — 99499 UNLISTED E&M SERVICE: CPT | Mod: S$GLB,,, | Performed by: OPTOMETRIST

## 2018-06-25 PROCEDURE — 99999 PR PBB SHADOW E&M-EST. PATIENT-LVL I: CPT | Mod: PBBFAC,,, | Performed by: OPTOMETRIST

## 2018-06-25 NOTE — PROGRESS NOTES
Subjective:       Patient ID: Gale Fernández is a 73 y.o. female      Chief Complaint   Patient presents with    Concerns About Ocular Health     History of Present Illness  Dls:  1 yr     72 y/o female presents today for eye exam.   Pt states no changes in vision. Pt wears single vision glasses for   distance and scls.   Pt wants a new rx for both cls and glasses. Pt states never could get use to glasses.  Pt states no tearing no itching no burning no pain ha's floaters os >od off/on.     Eye meds:  None    Pt wears Air Optix. Sleeps in lenses. Replaces once a month.        Assessment/Plan:     1. Routine eye exam  Octavio vision exam    2. Nuclear sclerosis of both eyes  Educated pt on presence of cataracts and effects on vision. No surgery at this time. Recheck in one year.    3. Refractive error  Educated patient on refractive error and discussed lens options. Pt c/o unable to read with current glasses. Discussed with pt current Rx is distance only and will need bifocal/PAL to have reading vision in glasses. Dispensed updated spectacle Rx. Educated about adaptation period to new specs.    Eyeglass Final Rx     Eyeglass Final Rx       Sphere Cylinder Axis Add    Right -11.75 +0.50 010 +2.50    Left -11.25 +1.50 015 +2.50    Expiration Date:  6/26/2019              Contact lens trials dispensed to pt. Pt wearing distance only CL with NVO glasses for reading. Daily wear only advised, replacement monthly with education to risks of extended wear.  Discussed lens care, compliance and solutions.  RTC 1 week PHREV CL final    Follow-up in about 1 week (around 7/2/2018) for PHREV CL final.

## 2018-06-28 ENCOUNTER — OFFICE VISIT (OUTPATIENT)
Dept: FAMILY MEDICINE | Facility: CLINIC | Age: 73
End: 2018-06-28
Payer: COMMERCIAL

## 2018-06-28 VITALS
DIASTOLIC BLOOD PRESSURE: 86 MMHG | HEIGHT: 65 IN | BODY MASS INDEX: 30.85 KG/M2 | TEMPERATURE: 99 F | WEIGHT: 185.19 LBS | HEART RATE: 63 BPM | SYSTOLIC BLOOD PRESSURE: 136 MMHG | OXYGEN SATURATION: 97 % | RESPIRATION RATE: 20 BRPM

## 2018-06-28 DIAGNOSIS — R51.9 NONINTRACTABLE HEADACHE, UNSPECIFIED CHRONICITY PATTERN, UNSPECIFIED HEADACHE TYPE: Primary | ICD-10-CM

## 2018-06-28 DIAGNOSIS — N39.0 URINARY TRACT INFECTION WITHOUT HEMATURIA, SITE UNSPECIFIED: ICD-10-CM

## 2018-06-28 PROCEDURE — 87147 CULTURE TYPE IMMUNOLOGIC: CPT

## 2018-06-28 PROCEDURE — 99999 PR PBB SHADOW E&M-EST. PATIENT-LVL III: CPT | Mod: PBBFAC,,, | Performed by: FAMILY MEDICINE

## 2018-06-28 PROCEDURE — 87086 URINE CULTURE/COLONY COUNT: CPT

## 2018-06-28 PROCEDURE — 87088 URINE BACTERIA CULTURE: CPT

## 2018-06-28 PROCEDURE — 99214 OFFICE O/P EST MOD 30 MIN: CPT | Mod: S$GLB,,, | Performed by: FAMILY MEDICINE

## 2018-06-28 RX ORDER — AMOXICILLIN AND CLAVULANATE POTASSIUM 500; 125 MG/1; MG/1
1 TABLET, FILM COATED ORAL 2 TIMES DAILY
Qty: 14 TABLET | Refills: 1 | Status: SHIPPED | OUTPATIENT
Start: 2018-06-28 | End: 2019-06-12 | Stop reason: ALTCHOICE

## 2018-06-28 NOTE — PROGRESS NOTES
Chief Complaint   Patient presents with    Headache     times one month    Neck Pain    Blurred Vision     right eye       HPI  Gale Fernández is a 73 y.o. female with multiple medical diagnoses as listed in the medical history and problem list that presents for headache.      Headache - new onset, 1 month hx, begin in AM, no response to tylenol, 6/10 L temporal, R occipital, +smell of burning, +mild blurry vision (followed by Optom/ophtho no dx), mild OD diplopia, no LOC or dizziness, no falls, no other neuro deficit, no photophobia, N/V, minimal neck pain/soreness.     Pt is known to me and was last seen by me on 2018.    PAST MEDICAL HISTORY:  Past Medical History:   Diagnosis Date    Atrial fibrillation     Followed by EP cardiology    Borderline hyperlipidemia     Cataract     History of abnormal Pap smear     More than 20 years ago; status post colposcopy    History of anxiety disorder     History of hypertension     Currently doing okay off medication    History of ventricular tachycardia     (PSVT) Status post ablation    Osteopenia     Refuses medication    Overweight(278.02)        PAST SURGICAL HISTORY:  Past Surgical History:   Procedure Laterality Date     SECTION, LOW TRANSVERSE      X1    left knee meniscus surgery  2012    neck tuck and liposuction  2012    PSVT ablation      Skin cancer removal on the left eye         SOCIAL HISTORY:  Social History     Social History    Marital status:      Spouse name: N/A    Number of children: 1    Years of education: N/A     Occupational History    Teacher      Social History Main Topics    Smoking status: Former Smoker     Types: Cigarettes    Smokeless tobacco: Never Used    Alcohol use Yes      Comment: Rarely    Drug use: No    Sexual activity: Not on file     Other Topics Concern    Not on file     Social History Narrative    No narrative on file       FAMILY HISTORY:  Family History   Problem Relation Age of  Onset    Heart disease Mother     Cancer Father         nasopharynx    Breast cancer Cousin     Hypertension Sister     Diabetes Sister     Coronary artery disease Brother         s/p stenting    Hypertension Brother     Rheum arthritis Sister     Hypertension Brother     Hypertension Brother     Heart disease Brother         s/p CABG    Hypertension Brother     Coronary artery disease Brother         s/p stenting    No Known Problems Maternal Grandmother     No Known Problems Maternal Grandfather     No Known Problems Paternal Grandmother     No Known Problems Paternal Grandfather     No Known Problems Maternal Aunt     No Known Problems Maternal Uncle     No Known Problems Paternal Aunt     No Known Problems Paternal Uncle     Colon cancer Neg Hx     Amblyopia Neg Hx     Blindness Neg Hx     Cataracts Neg Hx     Glaucoma Neg Hx     Macular degeneration Neg Hx     Retinal detachment Neg Hx     Strabismus Neg Hx     Stroke Neg Hx     Thyroid disease Neg Hx        ALLERGIES AND MEDICATIONS: updated and reviewed.  Review of patient's allergies indicates:   Allergen Reactions    Flecainide      Other reaction(s): worsening arrythmia     Current Outpatient Prescriptions   Medication Sig Dispense Refill    ALPRAZolam (XANAX) 0.25 MG tablet TAKE 1 TABLET BY MOUTH AT BEDTIME AS NEEDED FOR ANXIETRY 30 tablet 1    dronedarone (MULTAQ) 400 mg Tab TAKE 1 TABLET (400 MG TOTAL) BY MOUTH 2 (TWO) TIMES DAILY WITH MEALS. 60 tablet 10    lidocaine HCL 4% (XYLOCAINE) 4 % (40 mg/mL) external solution Apply 4 mLs topically as needed. 50 mL 2    metoprolol succinate (TOPROL-XL) 25 MG 24 hr tablet TAKE 1 TABLET (25 MG TOTAL) BY MOUTH ONCE DAILY. 90 tablet 3    rivaroxaban (XARELTO) 20 mg Tab Take 1 tablet (20 mg total) by mouth every evening. 30 tablet 11    TAZORAC 0.1 % cream APPLY TO AFFECTED AREA EVERY DAY AT BEDTIME  6    amoxicillin-clavulanate 500-125mg (AUGMENTIN) 500-125 mg Tab Take 1  "tablet (500 mg total) by mouth 2 (two) times daily. 14 tablet 1    levocetirizine (XYZAL) 5 MG tablet Take 1 tablet (5 mg total) by mouth every evening. 30 tablet 1    lidocaine-prilocaine (EMLA) cream Apply topically as needed. 25 g 0     No current facility-administered medications for this visit.        ROS  Review of Systems   Constitutional: Negative for activity change, appetite change and fever.   HENT: Negative for congestion and sore throat.    Eyes: Positive for visual disturbance.   Respiratory: Negative for cough and shortness of breath.    Cardiovascular: Negative for chest pain.   Gastrointestinal: Negative for abdominal pain, diarrhea, nausea and vomiting.   Endocrine: Negative.    Genitourinary: Negative for dysuria.   Musculoskeletal: Negative for arthralgias and back pain.   Skin: Negative for rash.   Allergic/Immunologic: Negative.    Neurological: Positive for headaches. Negative for dizziness and weakness.   Hematological: Negative.    Psychiatric/Behavioral: Negative for agitation and confusion.       Physical Exam  Vitals:    06/28/18 1035   BP: 136/86   Pulse: 63   Resp: 20   Temp: 98.5 °F (36.9 °C)    Body mass index is 30.82 kg/m².  Weight: 84 kg (185 lb 3 oz)   Height: 5' 5" (165.1 cm)     Physical Exam   Constitutional: She is oriented to person, place, and time. She appears well-developed and well-nourished.   HENT:   Head: Normocephalic and atraumatic.   Eyes: Conjunctivae and EOM are normal. Pupils are equal, round, and reactive to light.   Sluggish pupil OD, PERRLA    Neck: Normal range of motion. Neck supple.   Cardiovascular: Normal rate, regular rhythm and normal heart sounds.    Pulmonary/Chest: Effort normal and breath sounds normal.   Abdominal: Soft. Bowel sounds are normal.   Musculoskeletal: Normal range of motion.   Neck full ROM   Neurological: She is alert and oriented to person, place, and time. She has normal reflexes.   Full strength/ROM global   Skin: Skin is warm " and dry.   Psychiatric: She has a normal mood and affect. Her behavior is normal. Judgment and thought content normal.       Health Maintenance       Date Due Completion Date    Influenza Vaccine 08/01/2018 2/14/2018    Override on 12/15/2017: Declined    Override on 9/9/2016: Declined    Override on 9/25/2015: Declined    Override on 3/10/2015: Declined    DEXA SCAN 10/13/2018 10/13/2016    Override on 7/7/2011: Done    Fecal Occult Blood Test (FOBT)/[facility]Kit 12/26/2018 12/26/2017    Mammogram 06/22/2019 6/22/2018    Lipid Panel 02/12/2023 2/12/2018    TETANUS VACCINE 06/06/2026 6/6/2016          Assessment & Plan    Nonintractable headache, unspecified chronicity pattern, unspecified headache type  -     POCT URINE DIPSTICK WITH MICROSCOPE, AUTOMATED    Urinary tract infection without hematuria, site unspecified  -     Urine culture  -     amoxicillin-clavulanate 500-125mg (AUGMENTIN) 500-125 mg Tab; Take 1 tablet (500 mg total) by mouth 2 (two) times daily.  Dispense: 14 tablet; Refill: 1  - Will treat today and culture  - Monitor [facility] closely over next 2 weeks, return if symptoms worsen.         Follow-up in about 4 weeks (around 7/26/2018).

## 2018-06-29 LAB — BACTERIA UR CULT: NORMAL

## 2018-07-05 ENCOUNTER — OFFICE VISIT (OUTPATIENT)
Dept: OPTOMETRY | Facility: CLINIC | Age: 73
End: 2018-07-05
Payer: COMMERCIAL

## 2018-07-05 DIAGNOSIS — Z46.0 FITTING AND ADJUSTMENT OF SPECTACLES AND CONTACT LENSES: Primary | ICD-10-CM

## 2018-07-05 PROCEDURE — 99499 UNLISTED E&M SERVICE: CPT | Mod: S$GLB,,, | Performed by: OPTOMETRIST

## 2018-07-05 NOTE — PROGRESS NOTES
Subjective:       Patient ID: Gale Fernández is a 73 y.o. female      Chief Complaint   Patient presents with    Contact Lens Follow Up     History of Present Illness  06/25/18 Vo  Patient here for contact lens follow up.OS seem to have the feeling of pulling sensation.  Not sure which contact she has in either eye.  Using readers over contacts.        Assessment/Plan:     1. Fitting and adjustment of spectacles and contact lenses  Pt prefers -9.50 OU. Advised pt vision OS not as sharp as OD due to astigmatism. Pt prefers to keep same Rx both eyes, declines toric lens.    Contact lens Rx released to pt. Daily wear only advised, replacement monthly with education to risks of extended wear.  Discussed lens care, compliance and solutions. RTC yearly contact lens follow up.     Contact Lens Final Rx     Final Contact Lens Rx       Brand Base Curve Diameter Sphere Cylinder    Right Air Optix HydraGlyde 8.6 14.2 -9.50 Sphere    Left Air Optix HydraGlyde 8.6 14.2 -9.50 Sphere    Expiration Date:  7/6/2019    Replacement:  Monthly    Wearing Schedule:  Daily wear

## 2018-07-06 ENCOUNTER — TELEPHONE (OUTPATIENT)
Dept: OPTOMETRY | Facility: CLINIC | Age: 73
End: 2018-07-06

## 2018-07-31 NOTE — PROGRESS NOTES
"Ms. Fernández is a patient of Dr. Stanley and was last seen in clinic 8/2/2017.      Subjective:   Patient ID:  Gale Fernández is a 73 y.o. female who presents for follow-up of Atrial Fibrillation  .     HPI:    Ms. Fernández is a 73 y.o. female with pAF, AVNRT (s/p slow pathway modification 2004), osteopenia, HTN, and borderline HLD here for annual follow up.    Background:    Ms. 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 AT seen at the end of the procedure but it resolved once isoproterenol out of patient's system and AT was not ablated.  Ms. Fernández ultimately developed recurrent palpitations, and an Event Monitor at the time revealed SVT at 130 bpm.   Following this, she presented to clinic and was found to be in AF w/a controlled ventricular response. Ms. Fernández has a hx of noncompliance with her medical regimen; has changed medications and dosages on her own. She reportedly experienced fatigue and "low blood pressure" on flecainide.   At her office visit in September of 2015, Ms. Fernández agreed to resume dronedarone and Xarelto was initiated; she denied experiencing AF recurrence.  She did however, note back pain with radiation into her BLE and wondered whether one of her medications was contributing to her symptoms.  At her office visit 7/2016, Ms. Fernández reported feeling well overall with occasional baseline fatigue. She reported experiencing only 2 AF episodes during the preceding year; both of which lasted just several hours.   At her last office visit 8/2017 she reported only occasional palpitations and some minor sensory complaints.    Update (08/01/2018):    Today she is complaining of episodes of low BP (90s/60s) where she feels shaky. She eats to compensate which works but this has caused her to gain weight. She says a recent HA1C was normal. She says these episodes occur morning and in the afternoon. She thinks these episodes are related to her metoprolol. She has not been " taking her Multaq in a couple of months. She is currently taking xarelto 20mg for stroke prophylaxis and denies significant bleeding episodes. She has been identifying triggers (caffeine and anxiety) and when she avoids these triggers she generally can avoid them. Currently they occur about twice a week and can last up to an hour. She would rather the episodes than take the Multaq. She is currently taking metoprolol succinate 12.5mg for HR control. Kidney function is stable, with a creatinine of 0.7 on 2/12/2018.    I have personally reviewed the patient's EKG today, which shows sinus bradycardia at 58bpm. HI interval is 164. QTc is 459.    Recent Cardiac Tests:    2D Echo (8/3/2016):  CONCLUSIONS     1 - Normal left ventricular systolic function (EF 60-65%).     2 - Normal right ventricular systolic function .     3 - Normal left ventricular diastolic dysfunction GRADE I-II.     4 - The estimated PA systolic pressure is greater than 20 mmHg.     Current Outpatient Prescriptions   Medication Sig    ALPRAZolam (XANAX) 0.25 MG tablet TAKE 1 TABLET BY MOUTH AT BEDTIME AS NEEDED FOR ANXIETY    amoxicillin-clavulanate 500-125mg (AUGMENTIN) 500-125 mg Tab Take 1 tablet (500 mg total) by mouth 2 (two) times daily.    dronedarone (MULTAQ) 400 mg Tab TAKE 1 TABLET (400 MG TOTAL) BY MOUTH 2 (TWO) TIMES DAILY WITH MEALS. Patient not taking.    levocetirizine (XYZAL) 5 MG tablet Take 1 tablet (5 mg total) by mouth every evening.    lidocaine HCL 4% (XYLOCAINE) 4 % (40 mg/mL) external solution Apply 4 mLs topically as needed.    lidocaine-prilocaine (EMLA) cream Apply topically as needed.    metoprolol succinate (TOPROL-XL) 25 MG 24 hr tablet TAKE 1 TABLET (25 MG TOTAL) BY MOUTH ONCE DAILY. Patient taking 1/2 tablet.    rivaroxaban (XARELTO) 20 mg Tab Take 1 tablet (20 mg total) by mouth every evening.    TAZORAC 0.1 % cream APPLY TO AFFECTED AREA EVERY DAY AT BEDTIME     No current facility-administered medications for  "this visit.        Review of Systems   Constitution: Positive for malaise/fatigue (episodic).   Cardiovascular: Positive for palpitations. Negative for chest pain, dyspnea on exertion, irregular heartbeat and leg swelling.   Respiratory: Negative for shortness of breath.    Hematologic/Lymphatic: Negative for bleeding problem.   Skin: Negative for rash.   Musculoskeletal: Negative for myalgias.   Gastrointestinal: Negative for hematemesis, hematochezia and nausea.   Genitourinary: Negative for hematuria.   Neurological: Negative for light-headedness.   Psychiatric/Behavioral: Negative for altered mental status.   Allergic/Immunologic: Negative for persistent infections.     Objective:          /66   Pulse (!) 58   Ht 5' 5" (1.651 m)   Wt 85 kg (187 lb 6.3 oz)   LMP 07/24/2012   BMI 31.18 kg/m²     Physical Exam   Constitutional: She is oriented to person, place, and time. She appears well-developed and well-nourished.   HENT:   Head: Normocephalic.   Nose: Nose normal.   Eyes: Pupils are equal, round, and reactive to light.   Cardiovascular: Normal rate, regular rhythm, S1 normal and S2 normal.    No murmur heard.  Pulses:       Radial pulses are 2+ on the right side, and 2+ on the left side.   Pulmonary/Chest: Breath sounds normal. No respiratory distress.   Abdominal: Normal appearance.   Musculoskeletal: Normal range of motion. She exhibits no edema.   Neurological: She is alert and oriented to person, place, and time.   Skin: Skin is warm and dry. No erythema.   Psychiatric: She has a normal mood and affect. Her speech is normal and behavior is normal.   Nursing note and vitals reviewed.    Lab Results   Component Value Date     02/12/2018    K 4.8 02/12/2018    MG 2.1 07/23/2004    BUN 22 02/12/2018    CREATININE 0.7 02/12/2018    ALT 15 02/12/2018    AST 21 02/12/2018    HGB 13.1 02/12/2018    HCT 41.2 02/12/2018    TSH 1.109 02/12/2018    LDLCALC 125.6 02/12/2018           Assessment:     1. " Paroxysmal atrial fibrillation    2. SVT (supraventricular tachycardia)    3. History of hypertension    4. Current use of long term anticoagulation    5. Visit for monitoring Multaq therapy      Plan:     In summary, Ms. Fernández is a 73 y.o. female with pAF, AVNRT (s/p slow pathway modification 2004), osteopenia, HTN, and borderline HLD here for annual follow up. She is off her multaq and continuing to have episodes of palpitations, occurring relatively frequently (2/week), lasting minutes to an hour. She remains on xarelto for CVA prophylaxis ([id number] Score is 3: AGE, SEX, HTN). Dr. Stanley had a lengthy discussion about the importance of rhythm control and medication compliance. She agreed to restart her Multaq and continue metoprolol succinate at 12.5mg/day.    Restart drodenarone 400mg BID with food.  Take metoprolol succinate 12.5mg daily.  Continue xarelto.  RTC in 6 months, sooner if needed.    Follow-up in about 6 months (around 2/1/2019).    *Case was discussed with Dr. Stanley, who also counseled the patient.*  ------------------------------------------------------------------    HALEY Epstein, NP-C  [facility]

## 2018-08-01 ENCOUNTER — HOSPITAL ENCOUNTER (OUTPATIENT)
Dept: CARDIOLOGY | Facility: CLINIC | Age: 73
Discharge: HOME | End: 2018-08-01
Payer: COMMERCIAL

## 2018-08-01 ENCOUNTER — OFFICE VISIT (OUTPATIENT)
Dept: ELECTROPHYSIOLOGY | Facility: CLINIC | Age: 73
End: 2018-08-01
Payer: COMMERCIAL

## 2018-08-01 VITALS
BODY MASS INDEX: 31.22 KG/M2 | HEART RATE: 58 BPM | SYSTOLIC BLOOD PRESSURE: 122 MMHG | DIASTOLIC BLOOD PRESSURE: 66 MMHG | WEIGHT: 187.38 LBS | HEIGHT: 65 IN

## 2018-08-01 DIAGNOSIS — Z79.01 CURRENT USE OF LONG TERM ANTICOAGULATION: ICD-10-CM

## 2018-08-01 DIAGNOSIS — Z86.79 HISTORY OF HYPERTENSION: ICD-10-CM

## 2018-08-01 DIAGNOSIS — Z51.81 VISIT FOR MONITORING MULTAQ THERAPY: ICD-10-CM

## 2018-08-01 DIAGNOSIS — I47.10 SVT (SUPRAVENTRICULAR TACHYCARDIA): ICD-10-CM

## 2018-08-01 DIAGNOSIS — I48.91 ATRIAL FIBRILLATION, UNSPECIFIED TYPE: ICD-10-CM

## 2018-08-01 DIAGNOSIS — I48.0 PAROXYSMAL ATRIAL FIBRILLATION: Primary | ICD-10-CM

## 2018-08-01 DIAGNOSIS — Z79.899 VISIT FOR MONITORING MULTAQ THERAPY: ICD-10-CM

## 2018-08-01 PROCEDURE — 99214 OFFICE O/P EST MOD 30 MIN: CPT | Mod: S$GLB,,, | Performed by: INTERNAL MEDICINE

## 2018-08-01 PROCEDURE — 93000 ELECTROCARDIOGRAM COMPLETE: CPT | Mod: S$GLB,,, | Performed by: INTERNAL MEDICINE

## 2018-08-01 PROCEDURE — 99999 PR PBB SHADOW E&M-EST. PATIENT-LVL III: CPT | Mod: PBBFAC,,, | Performed by: INTERNAL MEDICINE

## 2018-08-01 RX ORDER — METOPROLOL SUCCINATE 25 MG/1
12.5 TABLET, EXTENDED RELEASE ORAL DAILY
Qty: 90 TABLET | Refills: 3 | Status: SHIPPED | OUTPATIENT
Start: 2018-08-01 | End: 2020-01-03

## 2019-01-04 ENCOUNTER — TELEPHONE (OUTPATIENT)
Dept: OPTOMETRY | Facility: CLINIC | Age: 74
End: 2019-01-04

## 2019-05-20 ENCOUNTER — TELEPHONE (OUTPATIENT)
Dept: ELECTROPHYSIOLOGY | Facility: CLINIC | Age: 74
End: 2019-05-20

## 2019-05-20 DIAGNOSIS — I48.91 ATRIAL FIBRILLATION, UNSPECIFIED TYPE: Primary | ICD-10-CM

## 2019-05-20 NOTE — TELEPHONE ENCOUNTER
Left voice mail for patient to call us back , patient requested appointment with Dr. Stanley for June however 's first available isnt until August, asked patient if she could see one of his partners nurse pracrtioner Vita Fox or Judith and to give us a call back to schedule the appointment.

## 2019-05-20 NOTE — TELEPHONE ENCOUNTER
Returned patients call left voice mail for patient to call us back to schedule her appointment.  ----- Message from Trish Martinez sent at 5/20/2019  2:34 PM CDT -----  Contact: Self  .Patient Returning Call from [facility]    Who Left Message for Patient: Stephania  Communication Preference: [phone]  Additional Information: Regarding scheduling appt, does not want NP, asking if can see another provider. Thanks

## 2019-06-12 ENCOUNTER — PATIENT OUTREACH (OUTPATIENT)
Dept: ADMINISTRATIVE | Facility: HOSPITAL | Age: 74
End: 2019-06-12

## 2019-06-12 DIAGNOSIS — Z12.11 SPECIAL SCREENING FOR MALIGNANT NEOPLASM OF COLON: ICD-10-CM

## 2019-06-12 DIAGNOSIS — E78.5 BORDERLINE HYPERLIPIDEMIA: ICD-10-CM

## 2019-06-12 DIAGNOSIS — M89.9 DISORDER OF BONE AND CARTILAGE: ICD-10-CM

## 2019-06-12 DIAGNOSIS — I48.0 PAROXYSMAL ATRIAL FIBRILLATION: Primary | ICD-10-CM

## 2019-06-12 DIAGNOSIS — M94.9 DISORDER OF BONE AND CARTILAGE: ICD-10-CM

## 2019-06-12 DIAGNOSIS — Z00.00 ROUTINE MEDICAL EXAM: ICD-10-CM

## 2019-06-12 DIAGNOSIS — R73.03 PREDIABETES: ICD-10-CM

## 2019-06-12 DIAGNOSIS — Z12.31 ENCOUNTER FOR SCREENING MAMMOGRAM FOR MALIGNANT NEOPLASM OF BREAST: Primary | ICD-10-CM

## 2019-06-17 ENCOUNTER — LAB VISIT (OUTPATIENT)
Dept: LAB | Facility: HOSPITAL | Age: 74
End: 2019-06-17
Attending: INTERNAL MEDICINE
Payer: COMMERCIAL

## 2019-06-17 ENCOUNTER — DOCUMENTATION ONLY (OUTPATIENT)
Dept: FAMILY MEDICINE | Facility: CLINIC | Age: 74
End: 2019-06-17

## 2019-06-17 DIAGNOSIS — E78.5 BORDERLINE HYPERLIPIDEMIA: ICD-10-CM

## 2019-06-17 DIAGNOSIS — I48.0 PAROXYSMAL ATRIAL FIBRILLATION: ICD-10-CM

## 2019-06-17 DIAGNOSIS — R73.03 PREDIABETES: ICD-10-CM

## 2019-06-17 LAB
ALBUMIN SERPL BCP-MCNC: 3.6 G/DL (ref 3.5–5.2)
ALP SERPL-CCNC: 50 U/L (ref 55–135)
ALT SERPL W/O P-5'-P-CCNC: 13 U/L (ref 10–44)
ANION GAP SERPL CALC-SCNC: 6 MMOL/L (ref 8–16)
AST SERPL-CCNC: 18 U/L (ref 10–40)
BASOPHILS # BLD AUTO: 0.04 K/UL (ref 0–0.2)
BASOPHILS NFR BLD: 0.5 % (ref 0–1.9)
BILIRUB SERPL-MCNC: 0.7 MG/DL (ref 0.1–1)
BUN SERPL-MCNC: 27 MG/DL (ref 8–23)
CALCIUM SERPL-MCNC: 9.9 MG/DL (ref 8.7–10.5)
CHLORIDE SERPL-SCNC: 104 MMOL/L (ref 95–110)
CHOLEST SERPL-MCNC: 199 MG/DL (ref 120–199)
CHOLEST/HDLC SERPL: 3.9 {RATIO} (ref 2–5)
CO2 SERPL-SCNC: 31 MMOL/L (ref 23–29)
CREAT SERPL-MCNC: 0.7 MG/DL (ref 0.5–1.4)
DIFFERENTIAL METHOD: ABNORMAL
EOSINOPHIL # BLD AUTO: 0.6 K/UL (ref 0–0.5)
EOSINOPHIL NFR BLD: 7.7 % (ref 0–8)
ERYTHROCYTE [DISTWIDTH] IN BLOOD BY AUTOMATED COUNT: 12.4 % (ref 11.5–14.5)
EST. GFR  (AFRICAN AMERICAN): >60 ML/MIN/1.73 M^2
EST. GFR  (NON AFRICAN AMERICAN): >60 ML/MIN/1.73 M^2
ESTIMATED AVG GLUCOSE: 114 MG/DL (ref 68–131)
GLUCOSE SERPL-MCNC: 91 MG/DL (ref 70–110)
HBA1C MFR BLD HPLC: 5.6 % (ref 4–5.6)
HCT VFR BLD AUTO: 43.6 % (ref 37–48.5)
HDLC SERPL-MCNC: 51 MG/DL (ref 40–75)
HDLC SERPL: 25.6 % (ref 20–50)
HGB BLD-MCNC: 13.6 G/DL (ref 12–16)
IMM GRANULOCYTES # BLD AUTO: 0.02 K/UL (ref 0–0.04)
IMM GRANULOCYTES NFR BLD AUTO: 0.3 % (ref 0–0.5)
LDLC SERPL CALC-MCNC: 128.8 MG/DL (ref 63–159)
LYMPHOCYTES # BLD AUTO: 1.9 K/UL (ref 1–4.8)
LYMPHOCYTES NFR BLD: 25.1 % (ref 18–48)
MCH RBC QN AUTO: 30.6 PG (ref 27–31)
MCHC RBC AUTO-ENTMCNC: 31.2 G/DL (ref 32–36)
MCV RBC AUTO: 98 FL (ref 82–98)
MONOCYTES # BLD AUTO: 0.6 K/UL (ref 0.3–1)
MONOCYTES NFR BLD: 7.6 % (ref 4–15)
NEUTROPHILS # BLD AUTO: 4.3 K/UL (ref 1.8–7.7)
NEUTROPHILS NFR BLD: 58.8 % (ref 38–73)
NONHDLC SERPL-MCNC: 148 MG/DL
NRBC BLD-RTO: 0 /100 WBC
PLATELET # BLD AUTO: 227 K/UL (ref 150–350)
PMV BLD AUTO: 9.9 FL (ref 9.2–12.9)
POTASSIUM SERPL-SCNC: 5.1 MMOL/L (ref 3.5–5.1)
PROT SERPL-MCNC: 6.8 G/DL (ref 6–8.4)
RBC # BLD AUTO: 4.45 M/UL (ref 4–5.4)
SODIUM SERPL-SCNC: 141 MMOL/L (ref 136–145)
TRIGL SERPL-MCNC: 96 MG/DL (ref 30–150)
TSH SERPL DL<=0.005 MIU/L-ACNC: 1.58 UIU/ML (ref 0.4–4)
WBC # BLD AUTO: 7.37 K/UL (ref 3.9–12.7)

## 2019-06-17 PROCEDURE — 80061 LIPID PANEL: CPT

## 2019-06-17 PROCEDURE — 36415 COLL VENOUS BLD VENIPUNCTURE: CPT | Mod: PO

## 2019-06-17 PROCEDURE — 84443 ASSAY THYROID STIM HORMONE: CPT

## 2019-06-17 PROCEDURE — 80053 COMPREHEN METABOLIC PANEL: CPT

## 2019-06-17 PROCEDURE — 83036 HEMOGLOBIN GLYCOSYLATED A1C: CPT

## 2019-06-17 PROCEDURE — 85025 COMPLETE CBC W/AUTO DIFF WBC: CPT

## 2019-06-18 ENCOUNTER — LAB VISIT (OUTPATIENT)
Dept: LAB | Facility: HOSPITAL | Age: 74
End: 2019-06-18
Attending: INTERNAL MEDICINE
Payer: COMMERCIAL

## 2019-06-18 DIAGNOSIS — Z12.11 SPECIAL SCREENING FOR MALIGNANT NEOPLASM OF COLON: ICD-10-CM

## 2019-06-18 DIAGNOSIS — I48.0 PAROXYSMAL ATRIAL FIBRILLATION: ICD-10-CM

## 2019-06-18 PROCEDURE — 82274 ASSAY TEST FOR BLOOD FECAL: CPT

## 2019-06-21 LAB — HEMOCCULT STL QL IA: NEGATIVE

## 2019-06-24 ENCOUNTER — HOSPITAL ENCOUNTER (OUTPATIENT)
Dept: RADIOLOGY | Facility: CLINIC | Age: 74
Discharge: HOME | End: 2019-06-24
Attending: INTERNAL MEDICINE
Payer: COMMERCIAL

## 2019-06-24 DIAGNOSIS — M89.9 DISORDER OF BONE AND CARTILAGE: ICD-10-CM

## 2019-06-24 DIAGNOSIS — M94.9 DISORDER OF BONE AND CARTILAGE: ICD-10-CM

## 2019-06-24 PROCEDURE — 77080 DXA BONE DENSITY AXIAL: CPT | Mod: 26,,, | Performed by: INTERNAL MEDICINE

## 2019-06-24 PROCEDURE — 77080 DEXA BONE DENSITY SPINE HIP: ICD-10-PCS | Mod: 26,,, | Performed by: INTERNAL MEDICINE

## 2019-06-24 PROCEDURE — 77080 DXA BONE DENSITY AXIAL: CPT | Mod: TC,PO

## 2019-06-26 ENCOUNTER — HOSPITAL ENCOUNTER (OUTPATIENT)
Dept: CARDIOLOGY | Facility: CLINIC | Age: 74
Discharge: HOME | End: 2019-06-26
Payer: COMMERCIAL

## 2019-06-26 ENCOUNTER — OFFICE VISIT (OUTPATIENT)
Dept: ELECTROPHYSIOLOGY | Facility: CLINIC | Age: 74
End: 2019-06-26
Payer: COMMERCIAL

## 2019-06-26 ENCOUNTER — OFFICE VISIT (OUTPATIENT)
Dept: FAMILY MEDICINE | Facility: CLINIC | Age: 74
End: 2019-06-26
Payer: COMMERCIAL

## 2019-06-26 VITALS
TEMPERATURE: 98 F | HEIGHT: 65 IN | SYSTOLIC BLOOD PRESSURE: 116 MMHG | HEART RATE: 84 BPM | WEIGHT: 183 LBS | OXYGEN SATURATION: 99 % | BODY MASS INDEX: 30.49 KG/M2 | DIASTOLIC BLOOD PRESSURE: 88 MMHG

## 2019-06-26 VITALS — HEIGHT: 65 IN | WEIGHT: 184.75 LBS | HEART RATE: 75 BPM | BODY MASS INDEX: 30.78 KG/M2

## 2019-06-26 DIAGNOSIS — Z86.79 HISTORY OF HYPERTENSION: ICD-10-CM

## 2019-06-26 DIAGNOSIS — Z51.81 VISIT FOR MONITORING MULTAQ THERAPY: ICD-10-CM

## 2019-06-26 DIAGNOSIS — I47.10 SVT (SUPRAVENTRICULAR TACHYCARDIA): ICD-10-CM

## 2019-06-26 DIAGNOSIS — F41.9 ANXIETY: ICD-10-CM

## 2019-06-26 DIAGNOSIS — Z79.899 VISIT FOR MONITORING MULTAQ THERAPY: ICD-10-CM

## 2019-06-26 DIAGNOSIS — M85.80 OSTEOPENIA, UNSPECIFIED LOCATION: ICD-10-CM

## 2019-06-26 DIAGNOSIS — I48.91 ATRIAL FIBRILLATION, UNSPECIFIED TYPE: ICD-10-CM

## 2019-06-26 DIAGNOSIS — Z79.01 CURRENT USE OF LONG TERM ANTICOAGULATION: ICD-10-CM

## 2019-06-26 DIAGNOSIS — Z12.31 ENCOUNTER FOR SCREENING MAMMOGRAM FOR BREAST CANCER: ICD-10-CM

## 2019-06-26 DIAGNOSIS — R73.03 PREDIABETES: ICD-10-CM

## 2019-06-26 DIAGNOSIS — N39.0 RECURRENT UTI: ICD-10-CM

## 2019-06-26 DIAGNOSIS — Z71.89 ADVANCED DIRECTIVES, COUNSELING/DISCUSSION: ICD-10-CM

## 2019-06-26 DIAGNOSIS — Z00.00 ROUTINE MEDICAL EXAM: Primary | ICD-10-CM

## 2019-06-26 DIAGNOSIS — I48.0 PAROXYSMAL ATRIAL FIBRILLATION: ICD-10-CM

## 2019-06-26 DIAGNOSIS — E78.5 BORDERLINE HYPERLIPIDEMIA: ICD-10-CM

## 2019-06-26 DIAGNOSIS — I48.0 PAROXYSMAL ATRIAL FIBRILLATION: Primary | ICD-10-CM

## 2019-06-26 PROCEDURE — 99999 PR PBB SHADOW E&M-EST. PATIENT-LVL III: CPT | Mod: PBBFAC,,, | Performed by: NURSE PRACTITIONER

## 2019-06-26 PROCEDURE — 99214 OFFICE O/P EST MOD 30 MIN: CPT | Mod: S$GLB,,, | Performed by: NURSE PRACTITIONER

## 2019-06-26 PROCEDURE — 1101F PR PT FALLS ASSESS DOC 0-1 FALLS W/OUT INJ PAST YR: ICD-10-PCS | Mod: CPTII,S$GLB,, | Performed by: NURSE PRACTITIONER

## 2019-06-26 PROCEDURE — 93010 ELECTROCARDIOGRAM REPORT: CPT | Mod: S$GLB,,, | Performed by: INTERNAL MEDICINE

## 2019-06-26 PROCEDURE — 99999 PR PBB SHADOW E&M-EST. PATIENT-LVL III: CPT | Mod: PBBFAC,,, | Performed by: INTERNAL MEDICINE

## 2019-06-26 PROCEDURE — 99397 PER PM REEVAL EST PAT 65+ YR: CPT | Mod: S$GLB,,, | Performed by: INTERNAL MEDICINE

## 2019-06-26 PROCEDURE — 93005 ELECTROCARDIOGRAM TRACING: CPT | Mod: S$GLB,,, | Performed by: INTERNAL MEDICINE

## 2019-06-26 PROCEDURE — 1101F PT FALLS ASSESS-DOCD LE1/YR: CPT | Mod: CPTII,S$GLB,, | Performed by: NURSE PRACTITIONER

## 2019-06-26 PROCEDURE — 99999 PR PBB SHADOW E&M-EST. PATIENT-LVL III: ICD-10-PCS | Mod: PBBFAC,,, | Performed by: INTERNAL MEDICINE

## 2019-06-26 PROCEDURE — 93005 RHYTHM STRIP: ICD-10-PCS | Mod: S$GLB,,, | Performed by: INTERNAL MEDICINE

## 2019-06-26 PROCEDURE — 99999 PR PBB SHADOW E&M-EST. PATIENT-LVL III: ICD-10-PCS | Mod: PBBFAC,,, | Performed by: NURSE PRACTITIONER

## 2019-06-26 PROCEDURE — 99214 PR OFFICE/OUTPT VISIT, EST, LEVL IV, 30-39 MIN: ICD-10-PCS | Mod: S$GLB,,, | Performed by: NURSE PRACTITIONER

## 2019-06-26 PROCEDURE — 99397 PR PREVENTIVE VISIT,EST,65 & OVER: ICD-10-PCS | Mod: S$GLB,,, | Performed by: INTERNAL MEDICINE

## 2019-06-26 PROCEDURE — 93010 RHYTHM STRIP: ICD-10-PCS | Mod: S$GLB,,, | Performed by: INTERNAL MEDICINE

## 2019-06-26 RX ORDER — NITROFURANTOIN MACROCRYSTALS 25 MG/1
25 CAPSULE ORAL EVERY 6 HOURS
Status: CANCELLED | OUTPATIENT
Start: 2019-06-26

## 2019-06-26 RX ORDER — NITROFURANTOIN (MACROCRYSTALS) 100 MG/1
100 CAPSULE ORAL EVERY 12 HOURS
Status: CANCELLED | OUTPATIENT
Start: 2019-06-26

## 2019-06-26 RX ORDER — ALPRAZOLAM 0.25 MG/1
TABLET ORAL
Qty: 30 TABLET | Refills: 0 | Status: SHIPPED | OUTPATIENT
Start: 2019-06-26 | End: 2020-04-13 | Stop reason: SDUPTHER

## 2019-06-26 NOTE — PROGRESS NOTES
"Ms. Fernández is a patient of Dr. Stanley and was last seen in clinic 8/1/2018.      Subjective:   Patient ID:  Gale Fernández is a 74 y.o. female who presents for follow-up of Atrial Fibrillation  .     HPI:    Ms. Fernández is a 74 y.o. female with pAF, AVNRT (s/p slow pathway modification 2004), osteopenia, HTN, and borderline HLD here for annual follow up.    Background:    Ms. 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 AT seen at the end of the procedure but it resolved once isoproterenol out of patient's system and AT was not ablated.  Ms. Fernández ultimately developed recurrent palpitations, and an Event Monitor at the time revealed SVT at 130 bpm.   Following this, she presented to clinic and was found to be in AF w/a controlled ventricular response. Ms. Fernández has a hx of noncompliance with her medical regimen; has changed medications and dosages on her own. She reportedly experienced fatigue and "low blood pressure" on flecainide.   At her office visit in September of 2015, Ms. Fernández agreed to resume dronedarone and Xarelto was initiated; she denied experiencing AF recurrence.  She did however, note back pain with radiation into her BLE and wondered whether one of her medications was contributing to her symptoms.  At her office visit 7/2016, Ms. Fernández reported feeling well overall with occasional baseline fatigue. She reported experiencing only 2 AF episodes during the preceding year; both of which lasted just several hours.   At her office visit 8/2017 she reported only occasional palpitations and some minor sensory complaints.  At office visit 8/2018 she reported palpitations and had stopped her Multaq. She agreed to restart this.     Update (06/26/2019):    Today she says she is feeling anxious. She says her "check engine" light turned on in her car and she has several other recent stressors exacerbating her anxiety. She has chronic nightmares. She says she did know she was " out of rhythm because her pulse was irregular when she felt it, but otherwise feels no physical symptoms. I asked her when she first noticed her pulse was irregular and she said this morning. She says she does not feel as if she is in AF much at all, because she is not feeling palpitations. Denies CP, [place], light-headedness, syncope. She says she does feel some fatigue and this may be worsening.     She is currently taking xarelto 20mg daily for stroke prophylaxis and denies significant bleeding episodes. She is currently being treated with multaq 400mcg BID for rhythm control and metoprolol succinate 12.5mg daily for HR control.  Kidney function is stable, with a creatinine of 0.7 on 6/17/2019.    I have personally reviewed the patient's EKG today, which shows coarse AF at 75bpm.    Recent Cardiac Tests:    2D Echo (8/3/2016):  CONCLUSIONS     1 - Normal left ventricular systolic function (EF 60-65%).     2 - Normal right ventricular systolic function .     3 - Normal left ventricular diastolic dysfunction GRADE I-II.     4 - The estimated PA systolic pressure is greater than 20 mmHg.     Current Outpatient Medications   Medication Sig    ALPRAZolam (XANAX) 0.25 MG tablet TAKE 1 TABLET BY MOUTH AT BEDTIME AS NEEDED FOR ANXIETRY    dronedarone (MULTAQ) 400 mg Tab TAKE 1 TABLET (400 MG TOTAL) BY MOUTH 2 (TWO) TIMES DAILY WITH MEALS.    levocetirizine (XYZAL) 5 MG tablet Take 1 tablet (5 mg total) by mouth every evening.    lidocaine HCL 4% (XYLOCAINE) 4 % (40 mg/mL) external solution Apply 4 mLs topically as needed.    lidocaine-prilocaine (EMLA) cream Apply topically as needed.    metoprolol succinate (TOPROL-XL) 25 MG 24 hr tablet Take 0.5 tablets (12.5 mg total) by mouth once daily. TAKE 1 TABLET (25 MG TOTAL) BY MOUTH ONCE DAILY.    rivaroxaban (XARELTO) 20 mg Tab Take 1 tablet (20 mg total) by mouth every evening.    TAZORAC 0.1 % cream APPLY TO AFFECTED AREA EVERY DAY AT BEDTIME     No current  "facility-administered medications for this visit.        Review of Systems   Constitution: Positive for malaise/fatigue.   Cardiovascular: Negative for chest pain, dyspnea on exertion, irregular heartbeat, leg swelling and palpitations.   Respiratory: Negative for shortness of breath.    Hematologic/Lymphatic: Negative for bleeding problem.   Skin: Negative for rash.   Musculoskeletal: Negative for myalgias.   Gastrointestinal: Negative for hematemesis, hematochezia and nausea.   Genitourinary: Negative for hematuria.   Neurological: Negative for light-headedness.   Psychiatric/Behavioral: Negative for altered mental status. The patient is nervous/anxious.    Allergic/Immunologic: Negative for persistent infections.     Objective:          Pulse 75   Ht 5' 5" (1.651 m)   Wt 83.8 kg (184 lb 11.9 oz)   LMP 07/24/2012   BMI 30.74 kg/m²     Physical Exam   Constitutional: She is oriented to person, place, and time. She appears well-developed and well-nourished.   HENT:   Head: Normocephalic.   Nose: Nose normal.   Eyes: Pupils are equal, round, and reactive to light.   Cardiovascular: Normal rate, S1 normal and S2 normal. An irregularly irregular rhythm present.   No murmur heard.  Pulses:       Radial pulses are 2+ on the right side, and 2+ on the left side.   Pulmonary/Chest: Breath sounds normal. No respiratory distress.   Abdominal: Normal appearance.   Musculoskeletal: Normal range of motion. She exhibits no edema.   Neurological: She is alert and oriented to person, place, and time.   Skin: Skin is warm and dry. No erythema.   Psychiatric: She has a normal mood and affect. Her speech is normal and behavior is normal.   Nursing note and vitals reviewed.    Lab Results   Component Value Date     06/17/2019    K 5.1 06/17/2019    MG 2.1 07/23/2004    BUN 27 (H) 06/17/2019    CREATININE 0.7 06/17/2019    ALT 13 06/17/2019    AST 18 06/17/2019    HGB 13.6 06/17/2019    HCT 43.6 06/17/2019    TSH 1.581 " 06/17/2019    LDLCALC 128.8 06/17/2019           Assessment:     1. Paroxysmal atrial fibrillation    2. SVT (supraventricular tachycardia)    3. History of hypertension    4. Visit for monitoring Multaq therapy    5. Current use of long term anticoagulation      Plan:     In summary, Ms. Fernández is a 74 y.o. female with pAF, AVNRT (s/p slow pathway modification 2004), osteopenia, HTN, and borderline HLD here for annual follow up.  She is back out of rhythm today, although she thinks this only happened this morning due to her acute anxiety. She says she is taking her multaq, but not with food (I reminded her to take her multaq and her xarelto with food). I am not certain whether she only just went out of rhythm or not. Patient says she is not interested in cardioversion and insists that her AF is paroxysmal, but she did think she was back in SR at one point during the clinic visit and her pulse was still irregular. She agreed to get a Holter monitor to determine how much AF she is having. Will also get an echo due to her reported fatigue. She remains on xarelto for CVA prophylaxis and metoprolol for rate control. TSH is WNL.    48 Hr Holter monitor.  Echo.  Continue current medications.  RTC in 6 weeks, sooner if needed.    *A copy of this note has been sent to Dr. Stanley*    Follow up in about 6 weeks (around 8/7/2019).    ------------------------------------------------------------------    HALEY Epstein, NP-C  Cardiac Electrophysiology

## 2019-06-26 NOTE — PROGRESS NOTES
HISTORY OF PRESENT ILLNESS:  Gale Fernández is a 74 y.o. female who presents to the clinic today for a routine medical physical exam. Her last physical exam was a few years ago.        PAST MEDICAL HISTORY:  Past Medical History:   Diagnosis Date    Atrial fibrillation     Followed by EP cardiology    Borderline hyperlipidemia     Cataract     History of abnormal Pap smear     More than 20 years ago; status post colposcopy    History of anxiety disorder     History of hypertension     Currently doing okay off medication    History of ventricular tachycardia     (PSVT) Status post ablation    Osteopenia     Refuses medication    Overweight(278.02)        PAST SURGICAL HISTORY:  Past Surgical History:   Procedure Laterality Date     SECTION, LOW TRANSVERSE      X1    left knee meniscus surgery  2012    neck tuck and liposuction  2012    PSVT ablation      Skin cancer removal on the left eye         SOCIAL HISTORY:  Social History     Socioeconomic History    Marital status:      Spouse name: Not on file    Number of children: 1    Years of education: Not on file    Highest education level: Not on file   Occupational History    Occupation: Teacher   Social Needs    Financial resource strain: Not on file    Food insecurity:     Worry: Not on file     Inability: Not on file    Transportation needs:     Medical: Not on file     Non-medical: Not on file   Tobacco Use    Smoking status: Former Smoker     Types: Cigarettes    Smokeless tobacco: Never Used   Substance and Sexual Activity    Alcohol use: Yes     Comment: Rarely    Drug use: No    Sexual activity: Not on file   Lifestyle    Physical activity:     Days per week: Not on file     Minutes per session: Not on file    Stress: Not on file   Relationships    Social connections:     Talks on phone: Not on file     Gets together: Not on file     Attends Christianity service: Not on file     Active member of club or organization:  Not on file     Attends meetings of clubs or organizations: Not on file     Relationship status: Not on file   Other Topics Concern    Not on file   Social History Narrative    Not on file       FAMILY HISTORY:  Family History   Problem Relation Age of Onset    Heart disease Mother     Cancer Father         nasopharynx    Breast cancer Cousin     Hypertension Sister     Diabetes Sister     Coronary artery disease Brother         s/p stenting    Hypertension Brother     Rheum arthritis Sister     Hypertension Brother     Hypertension Brother     Heart disease Brother         s/p CABG    Hypertension Brother     Coronary artery disease Brother         s/p stenting    No Known Problems Maternal Grandmother     No Known Problems Maternal Grandfather     No Known Problems Paternal Grandmother     No Known Problems Paternal Grandfather     No Known Problems Maternal Aunt     No Known Problems Maternal Uncle     No Known Problems Paternal Aunt     No Known Problems Paternal Uncle     Colon cancer Neg Hx     Amblyopia Neg Hx     Blindness Neg Hx     Cataracts Neg Hx     Glaucoma Neg Hx     Macular degeneration Neg Hx     Retinal detachment Neg Hx     Strabismus Neg Hx     Stroke Neg Hx     Thyroid disease Neg Hx        ALLERGIES AND MEDICATIONS: updated and reviewed.  Review of patient's allergies indicates:   Allergen Reactions    Flecainide Other (See Comments)     Other reaction(s): worsening arrythmia     Medication List with Changes/Refills   Current Medications    DRONEDARONE (MULTAQ) 400 MG TAB    TAKE 1 TABLET (400 MG TOTAL) BY MOUTH 2 (TWO) TIMES DAILY WITH MEALS.    LIDOCAINE HCL 4% (XYLOCAINE) 4 % (40 MG/ML) EXTERNAL SOLUTION    Apply 4 mLs topically as needed.    LIDOCAINE-PRILOCAINE (EMLA) CREAM    Apply topically as needed.    METOPROLOL SUCCINATE (TOPROL-XL) 25 MG 24 HR TABLET    Take 0.5 tablets (12.5 mg total) by mouth once daily. TAKE 1 TABLET (25 MG TOTAL) BY MOUTH ONCE  DAILY.    RIVAROXABAN (XARELTO) 20 MG TAB    Take 1 tablet (20 mg total) by mouth every evening.    TAZORAC 0.1 % CREAM    APPLY TO AFFECTED AREA EVERY DAY AT BEDTIME   Changed and/or Refilled Medications    Modified Medication Previous Medication    ALPRAZOLAM (XANAX) 0.25 MG TABLET ALPRAZolam (XANAX) 0.25 MG tablet       TAKE 1 TABLET BY MOUTH AT BEDTIME AS NEEDED FOR ANXIETRY    TAKE 1 TABLET BY MOUTH AT BEDTIME AS NEEDED FOR ANXIETRY   Discontinued Medications    LEVOCETIRIZINE (XYZAL) 5 MG TABLET    Take 1 tablet (5 mg total) by mouth every evening.         CARE TEAM:  Patient Care Team:  Vickie Hurtado MD as PCP - General (Internal Medicine)  Helga Mcmullen LPN as Licensed Practical Nurse  Jose Eduardo Stanley MD as Consulting Physician (Electrophysiology)           SCREENING HISTORY:  Health Maintenance       Date Due Completion Date    Shingles Vaccine (1 of 2) 03/24/1996 ---    Mammogram 06/22/2019 6/22/2018    Influenza Vaccine 08/01/2019 2/14/2018    Override on 12/15/2017: Declined    Override on 9/9/2016: Declined    Override on 9/25/2015: Declined    Override on 3/10/2015: Declined    Lipid Panel 06/17/2020 6/17/2019    Hemoglobin A1c 06/17/2020 6/17/2019    Fecal Occult Blood Test (FOBT)/[employer] 06/18/2020 6/18/2019    DEXA SCAN 06/24/2021 6/24/2019    Override on 7/7/2011: Done    TETANUS VACCINE 06/06/2026 6/6/2016            REVIEW OF SYSTEMS:   The patient reports: good dietary habits.  The patient reports : that they do not exercise, but stay active.  Review of Systems   Constitutional: Positive for activity change. Negative for chills, fatigue, fever and unexpected weight change.   HENT: Negative for congestion and postnasal drip.    Eyes: Negative for pain, discharge and visual disturbance.   Respiratory: Negative for cough, shortness of breath and wheezing.    Cardiovascular: Negative for chest pain, palpitations and leg swelling.        She reports that she had a mild panic attack this  "morning and she was in atrial fibrillation because of this when she saw her cardiologist this morning.   Gastrointestinal: Negative for abdominal pain, constipation, diarrhea, nausea and vomiting.   Genitourinary: Positive for difficulty urinating (- has had intermittent UTI in the past (usually occurs when she has a flare up of her hemorrhoids); takes otc azo with some relief of symptoms; no symptoms today). Negative for dysuria and hematuria.   Musculoskeletal: Negative for arthralgias and back pain.   Skin: Negative for rash.   Neurological: Negative for weakness and headaches.   Psychiatric/Behavioral: Negative for dysphoric mood and sleep disturbance. The patient is not nervous/anxious.      Breast ROS: negative for breast lumps             Physical Examination:   Vitals:    06/26/19 1303   BP: 116/88   Pulse: 84   Temp: 98.2 °F (36.8 °C)     Weight: 83 kg (183 lb)   Height: 5' 5" (165.1 cm)   Body mass index is 30.45 kg/m².      Patient did not require to have a chaperone present during the exam today.  General appearance - alert, well appearing, and in no distress and obese  Mental status - alert, oriented to person, place, and time, normal mood, behavior, speech, dress, motor activity, and thought processes  Eyes - pupils equal and reactive, extraocular eye movements intact, sclera anicteric  Mouth - mucous membranes moist, pharynx normal without lesions  Neck - supple, no significant adenopathy, carotids upstroke normal bilaterally, no bruits, thyroid exam: thyroid is normal in size without nodules or tenderness  Lymphatics - no palpable lymphadenopathy  Chest - clear to auscultation, no wheezes, rales or rhonchi, symmetric air entry  Heart - no gallops noted, irregularly irregular rhythm with rate controlled  Abdomen - soft, nontender, nondistended, no masses or organomegaly  Rectal - not examined  Breasts - not examined  Back exam - full range of motion, no tenderness, palpable spasm or pain on " motion  Neurological - alert, oriented, normal speech, no focal findings or movement disorder noted, cranial nerves II through XII intact  Musculoskeletal - no joint tenderness, deformity or swelling, no muscular tenderness noted  Extremities - peripheral pulses normal, no pedal edema, no clubbing or cyanosis  Skin - normal coloration and turgor, no rashes, no suspicious skin lesions noted            ASSESSMENT AND PLAN:  1. Routine medical exam  Counseled on age appropriate medical preventative services including age appropriate cancer screenings, age appropriate eye and dental exams, over all nutritional health, need for a consistent exercise regimen, and an over all push towards maintaining a vigorous and active lifestyle.  Counseled on age appropriate vaccines and discussed upcoming health care needs based on age/gender. Discussed good sleep hygiene and stress management.     2. Borderline hyperlipidemia  I evaluated and reviewed with the patient their cardiovascular risk:  The 10-year ASCVD risk score (Mario CONLEY Jr., et al., 2013) is: 10.9%    Values used to calculate the score:      Age: 74 years      Sex: Female      Is Non- : No      Diabetic: No      Tobacco smoker: No      Systolic Blood Pressure: 116 mmHg      Is BP treated: No      HDL Cholesterol: 51 mg/dL      Total Cholesterol: 199 mg/dL  We discussed that there would not be a benefit for the patient to take a daily aspirin - she is on Xarelto.  We discussed that there is an indication for the patient to be on statin therapy, if tolerated.  She declines to take statin medication.    3. Paroxysmal atrial fibrillation/4. SVT (supraventricular tachycardia)  The current medical regimen is effective;  continue present plan and medications. Followed by cardiology.    5. Osteopenia, unspecified location  We discussed adequate calcium and vitamin D supplementation. We discussed fall precautions. She is up to date on her BMD. No need for  prescription medication at this time     6. Prediabetes  Stable. We discussed low sugar/low carbohydrate diet and regular exercise to prevent progression. No need for prescription medication at this time.     7. Anxiety  Stable. Medication as needed.   - ALPRAZolam (XANAX) 0.25 MG tablet; TAKE 1 TABLET BY MOUTH AT BEDTIME AS NEEDED FOR ANXIETRY  Dispense: 30 tablet; Refill: 0    8. Encounter for screening mammogram for breast cancer  We placed a mammogram ordered.  She will schedule herself on line at her earliest convenience.    9. Recurrent UTI  She is asymptomatic today.  We discussed the etiology of increased urinary tract infections as women age.  No need for antibiotics at this time.  I gave her urine cup to take home so she can collect urine should she develop symptoms in the future.  She is aware that this would take 24-48 hours to get the results back on.  In the meantime, we discussed daily cranberry pills or juice to help prevent infection.  We also discussed wiping front to back only.  Possibly consider topical hormonal therapy if symptoms worsen or persist.  - Urine culture; Future    10. Advanced directives, counseling/discussion  Patient reports they have previously completed advance directives and they will bring paperwork from home at their earliest convenience so they can be scanned into their chart.            Follow up in about 6 months (around 12/26/2019), or if symptoms worsen or fail to improve, for follow up chronic medical conditions.. or sooner as needed.

## 2019-07-23 ENCOUNTER — PATIENT MESSAGE (OUTPATIENT)
Dept: FAMILY MEDICINE | Facility: CLINIC | Age: 74
End: 2019-07-23

## 2019-07-29 ENCOUNTER — TELEPHONE (OUTPATIENT)
Dept: FAMILY MEDICINE | Facility: CLINIC | Age: 74
End: 2019-07-29

## 2019-07-29 NOTE — TELEPHONE ENCOUNTER
Completed. Patient informed. Will need to wait and see as it goes back through her insurance. She will let me know if she continues to get bills.

## 2019-07-29 NOTE — TELEPHONE ENCOUNTER
----- Message from Vickie Hurtado MD sent at 7/24/2019  8:32 PM CDT -----  Shreyas De La Cruz, [facility]  Vickie Hurtado MD   Caller: Unspecified (Today,  9:17 AM)           The patient sent a email stating the bone density test and labs were coded wrong and were not covered by her insurance. She said it needs to be coded under her wellness physical exam. I sent a message to Marjorie and she said she could not change it. She told me to send a message to chart corrections. They sent me a message stating that you needed to go in the visit and change it to a wellness physical exam.   Thanks,   Cassia

## 2019-08-27 DIAGNOSIS — I48.0 PAROXYSMAL ATRIAL FIBRILLATION: ICD-10-CM

## 2019-09-04 ENCOUNTER — PATIENT MESSAGE (OUTPATIENT)
Dept: FAMILY MEDICINE | Facility: CLINIC | Age: 74
End: 2019-09-04

## 2019-09-18 ENCOUNTER — HOSPITAL ENCOUNTER (INPATIENT)
Facility: HOSPITAL | Age: 74
LOS: 1 days | Discharge: HOME | DRG: 065 | End: 2019-09-19
Attending: EMERGENCY MEDICINE | Admitting: PSYCHIATRY & NEUROLOGY
Payer: COMMERCIAL

## 2019-09-18 DIAGNOSIS — Z86.79 HISTORY OF ATRIAL FIBRILLATION: ICD-10-CM

## 2019-09-18 DIAGNOSIS — I48.91 ATRIAL FIBRILLATION: ICD-10-CM

## 2019-09-18 DIAGNOSIS — I16.0 HYPERTENSIVE URGENCY: ICD-10-CM

## 2019-09-18 DIAGNOSIS — N39.0 ACUTE UTI: ICD-10-CM

## 2019-09-18 DIAGNOSIS — I48.91 ATRIAL FIBRILLATION WITH RVR: ICD-10-CM

## 2019-09-18 DIAGNOSIS — G45.9 TIA (TRANSIENT ISCHEMIC ATTACK): ICD-10-CM

## 2019-09-18 DIAGNOSIS — R29.90 EPISODE OF TRANSIENT NEUROLOGIC SYMPTOMS: ICD-10-CM

## 2019-09-18 DIAGNOSIS — I63.411 EMBOLIC STROKE INVOLVING RIGHT MIDDLE CEREBRAL ARTERY: Primary | ICD-10-CM

## 2019-09-18 DIAGNOSIS — I10 HYPERTENSION: ICD-10-CM

## 2019-09-18 LAB
ALBUMIN SERPL-MCNC: 4.1 G/DL (ref 3.3–5.5)
ALP SERPL-CCNC: 52 U/L (ref 42–141)
BILIRUB SERPL-MCNC: 0.8 MG/DL (ref 0.2–1.6)
BILIRUBIN, POC UA: NEGATIVE
BLOOD, POC UA: ABNORMAL
BUN SERPL-MCNC: 22 MG/DL (ref 7–22)
CALCIUM SERPL-MCNC: 9.7 MG/DL (ref 8–10.3)
CHLORIDE SERPL-SCNC: 106 MMOL/L (ref 98–108)
CLARITY, POC UA: CLEAR
COLOR, POC UA: YELLOW
CREAT SERPL-MCNC: 0.8 MG/DL (ref 0.6–1.2)
GLUCOSE SERPL-MCNC: 105 MG/DL (ref 73–118)
GLUCOSE, POC UA: NEGATIVE
KETONES, POC UA: NEGATIVE
LEUKOCYTE EST, POC UA: ABNORMAL
NITRITE, POC UA: NEGATIVE
PH UR STRIP: 5.5 [PH]
POC ALT (SGPT): 17 U/L (ref 10–47)
POC AST (SGOT): 27 U/L (ref 11–38)
POC B-TYPE NATRIURETIC PEPTIDE: 237 PG/ML (ref 0–100)
POC CARDIAC TROPONIN I: 0 NG/ML
POC PTINR: 1 (ref 0.9–1.2)
POC PTWBT: 12.5 SEC (ref 9.7–14.3)
POC TCO2: 29 MMOL/L (ref 18–33)
POTASSIUM BLD-SCNC: 3.9 MMOL/L (ref 3.6–5.1)
PROTEIN, POC UA: NEGATIVE
PROTEIN, POC: 7.3 G/DL (ref 6.4–8.1)
SAMPLE: NORMAL
SAMPLE: NORMAL
SODIUM BLD-SCNC: 143 MMOL/L (ref 128–145)
SPECIFIC GRAVITY, POC UA: >=1.03
UROBILINOGEN, POC UA: 0.2 E.U./DL

## 2019-09-18 PROCEDURE — 84484 ASSAY OF TROPONIN QUANT: CPT | Mod: ER

## 2019-09-18 PROCEDURE — 93005 ELECTROCARDIOGRAM TRACING: CPT

## 2019-09-18 PROCEDURE — 80061 LIPID PANEL: CPT

## 2019-09-18 PROCEDURE — 80053 COMPREHEN METABOLIC PANEL: CPT | Mod: ER

## 2019-09-18 PROCEDURE — 85730 THROMBOPLASTIN TIME PARTIAL: CPT

## 2019-09-18 PROCEDURE — 85025 COMPLETE CBC W/AUTO DIFF WBC: CPT | Mod: ER

## 2019-09-18 PROCEDURE — 83735 ASSAY OF MAGNESIUM: CPT

## 2019-09-18 PROCEDURE — 84443 ASSAY THYROID STIM HORMONE: CPT

## 2019-09-18 PROCEDURE — 96375 TX/PRO/DX INJ NEW DRUG ADDON: CPT | Mod: 59

## 2019-09-18 PROCEDURE — 93010 EKG 12-LEAD: ICD-10-PCS | Mod: 76,,, | Performed by: INTERNAL MEDICINE

## 2019-09-18 PROCEDURE — 83880 ASSAY OF NATRIURETIC PEPTIDE: CPT | Mod: ER

## 2019-09-18 PROCEDURE — 99223 1ST HOSP IP/OBS HIGH 75: CPT | Mod: ,,, | Performed by: PSYCHIATRY & NEUROLOGY

## 2019-09-18 PROCEDURE — 80053 COMPREHEN METABOLIC PANEL: CPT

## 2019-09-18 PROCEDURE — 87086 URINE CULTURE/COLONY COUNT: CPT

## 2019-09-18 PROCEDURE — 93005 ELECTROCARDIOGRAM TRACING: CPT | Mod: ER

## 2019-09-18 PROCEDURE — 63600175 PHARM REV CODE 636 W HCPCS: Mod: ER | Performed by: EMERGENCY MEDICINE

## 2019-09-18 PROCEDURE — 93010 ELECTROCARDIOGRAM REPORT: CPT | Mod: ,,, | Performed by: INTERNAL MEDICINE

## 2019-09-18 PROCEDURE — 99223 PR INITIAL HOSPITAL CARE,LEVL III: ICD-10-PCS | Mod: ,,, | Performed by: PSYCHIATRY & NEUROLOGY

## 2019-09-18 PROCEDURE — 81001 URINALYSIS AUTO W/SCOPE: CPT

## 2019-09-18 PROCEDURE — 99291 CRITICAL CARE FIRST HOUR: CPT | Mod: 25

## 2019-09-18 PROCEDURE — 85610 PROTHROMBIN TIME: CPT

## 2019-09-18 PROCEDURE — 82553 CREATINE MB FRACTION: CPT

## 2019-09-18 PROCEDURE — 82550 ASSAY OF CK (CPK): CPT

## 2019-09-18 PROCEDURE — 83036 HEMOGLOBIN GLYCOSYLATED A1C: CPT

## 2019-09-18 PROCEDURE — 84100 ASSAY OF PHOSPHORUS: CPT

## 2019-09-18 PROCEDURE — 96376 TX/PRO/DX INJ SAME DRUG ADON: CPT

## 2019-09-18 PROCEDURE — 81003 URINALYSIS AUTO W/O SCOPE: CPT | Mod: ER

## 2019-09-18 PROCEDURE — 84484 ASSAY OF TROPONIN QUANT: CPT

## 2019-09-18 PROCEDURE — 93010 EKG 12-LEAD: ICD-10-PCS | Mod: ,,, | Performed by: INTERNAL MEDICINE

## 2019-09-18 PROCEDURE — G0378 HOSPITAL OBSERVATION PER HR: HCPCS

## 2019-09-18 PROCEDURE — 83880 ASSAY OF NATRIURETIC PEPTIDE: CPT

## 2019-09-18 PROCEDURE — 85610 PROTHROMBIN TIME: CPT | Mod: ER

## 2019-09-18 PROCEDURE — 25000003 PHARM REV CODE 250: Mod: ER | Performed by: EMERGENCY MEDICINE

## 2019-09-18 PROCEDURE — 96374 THER/PROPH/DIAG INJ IV PUSH: CPT

## 2019-09-18 RX ORDER — CEFTRIAXONE 1 G/1
1 INJECTION, POWDER, FOR SOLUTION INTRAMUSCULAR; INTRAVENOUS
Status: COMPLETED | OUTPATIENT
Start: 2019-09-18 | End: 2019-09-18

## 2019-09-18 RX ORDER — METOPROLOL TARTRATE 25 MG/1
25 TABLET, FILM COATED ORAL
Status: COMPLETED | OUTPATIENT
Start: 2019-09-18 | End: 2019-09-18

## 2019-09-18 RX ORDER — LABETALOL HCL 20 MG/4 ML
10 SYRINGE (ML) INTRAVENOUS EVERY 4 HOURS PRN
Status: DISCONTINUED | OUTPATIENT
Start: 2019-09-19 | End: 2019-09-19 | Stop reason: HOSPADM

## 2019-09-18 RX ORDER — LABETALOL HYDROCHLORIDE 5 MG/ML
10 INJECTION, SOLUTION INTRAVENOUS
Status: COMPLETED | OUTPATIENT
Start: 2019-09-18 | End: 2019-09-18

## 2019-09-18 RX ORDER — ATORVASTATIN CALCIUM 20 MG/1
40 TABLET, FILM COATED ORAL DAILY
Status: DISCONTINUED | OUTPATIENT
Start: 2019-09-19 | End: 2019-09-19 | Stop reason: HOSPADM

## 2019-09-18 RX ORDER — ONDANSETRON 8 MG/1
8 TABLET, ORALLY DISINTEGRATING ORAL EVERY 8 HOURS PRN
Status: DISCONTINUED | OUTPATIENT
Start: 2019-09-19 | End: 2019-09-19 | Stop reason: HOSPADM

## 2019-09-18 RX ORDER — ACETAMINOPHEN 325 MG/1
650 TABLET ORAL EVERY 6 HOURS PRN
Status: DISCONTINUED | OUTPATIENT
Start: 2019-09-19 | End: 2019-09-19 | Stop reason: HOSPADM

## 2019-09-18 RX ORDER — HEPARIN SODIUM 5000 [USP'U]/ML
5000 INJECTION, SOLUTION INTRAVENOUS; SUBCUTANEOUS EVERY 8 HOURS
Status: DISCONTINUED | OUTPATIENT
Start: 2019-09-19 | End: 2019-09-18

## 2019-09-18 RX ORDER — SODIUM CHLORIDE 0.9 % (FLUSH) 0.9 %
10 SYRINGE (ML) INJECTION
Status: DISCONTINUED | OUTPATIENT
Start: 2019-09-19 | End: 2019-09-19 | Stop reason: HOSPADM

## 2019-09-18 RX ORDER — ONDANSETRON 2 MG/ML
4 INJECTION INTRAMUSCULAR; INTRAVENOUS EVERY 12 HOURS PRN
Status: DISCONTINUED | OUTPATIENT
Start: 2019-09-19 | End: 2019-09-19 | Stop reason: HOSPADM

## 2019-09-18 RX ORDER — ENALAPRILAT 1.25 MG/ML
1.25 INJECTION INTRAVENOUS
Status: COMPLETED | OUTPATIENT
Start: 2019-09-18 | End: 2019-09-18

## 2019-09-18 RX ADMIN — METOPROLOL TARTRATE 25 MG: 25 TABLET ORAL at 02:09

## 2019-09-18 RX ADMIN — RIVAROXABAN 20 MG: 10 TABLET, FILM COATED ORAL at 02:09

## 2019-09-18 RX ADMIN — ENALAPRILAT 1.25 MG: 2.5 INJECTION INTRAVENOUS at 04:09

## 2019-09-18 RX ADMIN — LABETALOL HYDROCHLORIDE 10 MG: 5 INJECTION, SOLUTION INTRAVENOUS at 01:09

## 2019-09-18 RX ADMIN — LABETALOL HYDROCHLORIDE 10 MG: 5 INJECTION, SOLUTION INTRAVENOUS at 03:09

## 2019-09-18 RX ADMIN — CEFTRIAXONE SODIUM 1 G: 1 INJECTION, POWDER, FOR SOLUTION INTRAMUSCULAR; INTRAVENOUS at 01:09

## 2019-09-18 NOTE — ED NOTES
Pt reports taking Xanax 12.5mg last night for anxiety.  Pt reports last taking Metoprolol yesterday morning.

## 2019-09-18 NOTE — ED PROVIDER NOTES
Encounter Date: 2019    SCRIBE #1 NOTE: I, Berry Gonzales, am scribing for, and in the presence of,  Dr. Woods. I have scribed the following portions of the note - the EKG reading. Other sections scribed: HPI, ROS, PE.       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 30 min.  /137  by school nurse.  All symptoms are resolved at this time, pt c/o feeling tired.     Gale Fernández is a 74 y.o. Female with history of A-fib, HTN, and anxiety who presents to the ED complaining of voice change and left hand weakness lasting about 30 min. 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 School nurse measured the pt's BP at 182/137.  All symptoms have resolved. Pt is currently mildly light-headed and dizzy. Pt reports that she feels dizzy and vertigo in the mornings with nausea. Pt did not take metoprolol and Xarelto as prescribed today and yesterday.        Review of patient's allergies indicates:   Allergen Reactions    Flecainide Other (See Comments)     Other reaction(s): worsening arrythmia     Past Medical History:   Diagnosis Date    Atrial fibrillation     Followed by EP cardiology    Borderline hyperlipidemia     Cataract     History of abnormal Pap smear     More than 20 years ago; status post colposcopy    History of anxiety disorder     History of hypertension     Currently doing okay off medication    History of ventricular tachycardia     (PSVT) Status post ablation    Hypertension     Osteopenia     Refuses medication    Overweight(278.02)      Past Surgical History:   Procedure Laterality Date     SECTION, LOW TRANSVERSE      X1    left knee meniscus surgery  2012    neck tuck and  liposuction  2012    PSVT ablation      Skin cancer removal on the left eye       Family History   Problem Relation Age of Onset    Heart disease Mother     Cancer Father         nasopharynx    Breast cancer Cousin     Hypertension Sister     Diabetes Sister     Coronary artery disease Brother         s/p stenting    Hypertension Brother     Rheum arthritis Sister     Hypertension Brother     Hypertension Brother     Heart disease Brother         s/p CABG    Hypertension Brother     Coronary artery disease Brother         s/p stenting    No Known Problems Maternal Grandmother     No Known Problems Maternal Grandfather     No Known Problems Paternal Grandmother     No Known Problems Paternal Grandfather     No Known Problems Maternal Aunt     No Known Problems Maternal Uncle     No Known Problems Paternal Aunt     No Known Problems Paternal Uncle     Colon cancer Neg Hx     Amblyopia Neg Hx     Blindness Neg Hx     Cataracts Neg Hx     Glaucoma Neg Hx     Macular degeneration Neg Hx     Retinal detachment Neg Hx     Strabismus Neg Hx     Stroke Neg Hx     Thyroid disease Neg Hx      Social History     Tobacco Use    Smoking status: Former Smoker     Types: Cigarettes    Smokeless tobacco: Never Used   Substance Use Topics    Alcohol use: Yes     Comment: Rarely    Drug use: No     Review of Systems   Constitutional: Negative for fever.   HENT: Positive for voice change. Negative for rhinorrhea and sore throat.    Eyes: Negative for redness.   Respiratory: Negative for shortness of breath.    Cardiovascular: Negative for chest pain and leg swelling.   Gastrointestinal: Negative for abdominal pain, diarrhea, nausea and vomiting.   Musculoskeletal: Negative for back pain.   Skin: Negative for rash.   Neurological: Positive for dizziness (mild), weakness and light-headedness (mild). Negative for syncope and headaches.   All other systems reviewed and are negative.      Physical Exam      Initial Vitals [09/18/19 1227]   BP Pulse Resp Temp SpO2   (!) 183/88 110 18 98.5 °F (36.9 °C) 99 %      MAP       --         Patient gave consent to have physical exam performed.    Physical Exam    Nursing note and vitals reviewed.  Constitutional: She appears well-developed and well-nourished.   HENT:   Head: Normocephalic and atraumatic.   Right Ear: External ear normal.   Left Ear: External ear normal.   Nose: Nose normal.   Mouth/Throat: Oropharynx is clear and moist.   Eyes: Conjunctivae and EOM are normal. Pupils are equal, round, and reactive to light.   Neck: Normal range of motion and phonation normal. Neck supple.   Cardiovascular: Regular rhythm, normal heart sounds and intact distal pulses. Tachycardia present.  Exam reveals no gallop and no friction rub.    No murmur heard.  Pulmonary/Chest: Effort normal and breath sounds normal. No stridor. No respiratory distress. She has no wheezes. She has no rhonchi. She has no rales. She exhibits no tenderness.   Abdominal: Soft. Bowel sounds are normal. She exhibits no distension. There is no tenderness. There is no rigidity, no rebound and no guarding.   Musculoskeletal: Normal range of motion. She exhibits no edema or tenderness.   Neurological: She is alert and oriented to person, place, and time. She has normal strength. No cranial nerve deficit or sensory deficit. GCS score is 15. GCS eye subscore is 4. GCS verbal subscore is 5. GCS motor subscore is 6.   Cranial nerves 2-12 intact.   Skin: Skin is warm and dry. Capillary refill takes less than 2 seconds. No rash noted.   Psychiatric: She has a normal mood and affect. Her behavior is normal.         ED Course   Critical Care  Date/Time: 9/18/2019 1:56 PM  Performed by: Estelle Woods DO  Authorized by: Estelle Woods DO   Direct patient critical care time: 8 minutes  Additional history critical care time: 8 minutes  Ordering / reviewing critical care time: 8 minutes  Documentation critical care time: 8  minutes  Consulting other physicians critical care time: 8 minutes  Total critical care time (exclusive of procedural time) : 40 minutes  Critical care was necessary to treat or prevent imminent or life-threatening deterioration of the following conditions: CNS failure or compromise, cardiac failure and circulatory failure.  Critical care was time spent personally by me on the following activities: development of treatment plan with patient or surrogate, discussions with consultants, interpretation of cardiac output measurements, evaluation of patient's response to treatment, examination of patient, obtaining history from patient or surrogate, ordering and review of laboratory studies, ordering and performing treatments and interventions, ordering and review of radiographic studies, re-evaluation of patient's condition, review of old charts and pulse oximetry.        Labs Reviewed   POCT URINALYSIS W/O SCOPE - Abnormal; Notable for the following components:       Result Value    Glucose, UA Negative (*)     Bilirubin, UA Negative (*)     Ketones, UA Negative (*)     Spec Grav UA >=1.030 (*)     Blood, UA 1+ (*)     Protein, UA Negative (*)     Nitrite, UA Negative (*)     Leukocytes, UA 1+ (*)     All other components within normal limits   POCT B-TYPE NATRIURETIC PEPTIDE (BNP) - Abnormal; Notable for the following components:    POC B-Type Natriuretic Peptide 237 (*)     All other components within normal limits   CULTURE, URINE   TROPONIN ISTAT   POCT CBC   POCT URINALYSIS W/O SCOPE   POCT CMP   POCT PROTIME-INR   POCT TROPONIN   POCT B-TYPE NATRIURETIC PEPTIDE (BNP)   ISTAT PROCEDURE   POCT CMP     EKG Readings: (Independently Interpreted)   Initial Reading: No STEMI. Previous EKG: Compared with most recent EKG Previous EKG Date: When compared to prior EKG 06/26/2019 rate has increased by 46 beats per minute today. Rhythm: Atrial Fibrillation (Atrial fibrillation with RVR). Heart Rate: 121. Axis: Left Axis  Deviation. Other Impression: Abnormal EKG, ST and T-wave abnormalities appreciated, .   Other EKG Interpretations: 16:21  Atrial fibrillation  Ventricular rate: 81  Abnormal EKG  Normal axis  When compared to prior EKG 12:38 decreased by 40 BPM.     ECG Results          EKG 12-lead (In process)  Result time 09/18/19 12:42:44    In process by Interface, Lab In [facility] (09/18/19 12:42:44)                 Narrative:    Test Reason : Z86.79,    Vent. Rate : 121 BPM     Atrial Rate : 144 BPM     P-R Int : 000 ms          QRS Dur : 080 ms      QT Int : 344 ms       P-R-T Axes : 000 002 -10 degrees     QTc Int : 488 ms    Atrial fibrillation with rapid ventricular response with premature  ventricular or aberrantly conducted complexes  Cannot rule out Anterior infarct ,age undetermined  ST and T wave abnormality, consider inferior ischemia  Abnormal ECG  When compared with ECG of 26-JUN-2019 08:30,  Vent. rate has increased BY  46 BPM  T wave inversion less evident in Inferior leads  Nonspecific T wave abnormality now evident in Lateral leads    Referred By: [email or web address]   SELF           Confirmed By:                             Imaging Results          X-Ray Chest PA And Lateral (Final result)  Result time 09/18/19 13:30:41    Final result by Ppai Gonzales MD (09/18/19 13:30:41)                 Impression:      No acute cardiopulmonary processes.      Electronically signed by: Papi Gonzales MD  Date:    09/18/2019  Time:    13:30             Narrative:    EXAMINATION:  XR CHEST PA AND LATERAL    CLINICAL HISTORY:  Hypertension;    TECHNIQUE:  PA and lateral views of the chest were performed.    COMPARISON:  Chest 03/06/2013    FINDINGS:  Cardiomediastinal silhouette remains within normal limits.  There is slight tortuosity of the descending thoracic aorta when compared to the previous study.    Lungs are well inflated.  There is no lobar consolidations or pneumothorax or pulmonary vascular congestion or pleural  effusions.  There are cardiac monitoring leads over the chest.  There is generalized osteopenia.  Mild spondylotic changes in the dorsal spine.                               CT Head Without Contrast (Final result)  Result time 09/18/19 13:31:36    Final result by Vinh Ramirez MD (09/18/19 13:31:36)                 Impression:      1. No acute intracranial abnormalities noting sequela of chronic microvascular ischemic change and senescent change.  2. Bilateral mastoid effusions.      Electronically signed by: Vinh Ramirez MD  Date:    09/18/2019  Time:    13:31             Narrative:    EXAMINATION:  CT HEAD WITHOUT CONTRAST    CLINICAL HISTORY:  Stroke;    TECHNIQUE:  Low dose axial images were obtained through the head.  Coronal and sagittal reformations were also performed. Contrast was not administered.    COMPARISON:  08/29/2009    FINDINGS:  There is generalized cerebral volume loss.  There is hypoattenuation in a periventricular fashion, likely sequela of chronic microvascular ischemic change.  There is no evidence of acute major vascular territory infarct, hemorrhage, or mass.  There is no hydrocephalus.  There are no abnormal extra-axial fluid collections.  There is partial opacification of the mastoid air cells bilaterally, otherwise the visualized portions of the paranasal sinuses and mastoid air cells are clear, and there is no evidence of calvarial fracture.  The visualized soft tissues are unremarkable.                                   Medical Decision Making:   History:   Old Medical Records: I decided to obtain old medical records.  Independently Interpreted Test(s):   I have ordered and independently interpreted EKG Reading(s) - see prior notes  Clinical Tests:   Lab Tests: Reviewed and Ordered  Radiological Study: Ordered and Reviewed  Medical Tests: Ordered and Reviewed  ED Management:  Will order EKG, head CT, chest x-ray, UA, CBC, CMP, INR, troponin, and BNP.    Will treat with    Medical decision making   Chief complaint: weakness, dizzy, light-headed, and voice change.  Differential diagnosis:  Treatment in the ED Physical Exam,   Patient reports feeling better after treatment in the ER.    Discussed diagnosis, labs, and imaging results with the patient.      Patient is agreeable to transfer & admission..    I spoke with Ariadna BEGUM.    Requesting consultation with vaso-neurology  for services not available at this facility.   Discussed patient's presentation, past medical history, physical exam, labs, radiology results, vital signs, and ED course.  Consultation with DR Deutsch for transfer and admission.  At this time patient will be transferred & admitted.  Patient will be transferred via EMS to accepting facility.      At time of transfer patient is awake alert oriented x4 speaking clearly in full sentences and moving all 4 extremities.            Scribe Attestation:   Scribe #1: I performed the above scribed service and the documentation accurately describes the services I performed. I attest to the accuracy of the note.     I, Dr. Estelle Woods, personally performed the services described in this documentation. This document was produced by a scribe under my direction and in my presence. All medical record entries made by the scribe were at my direction and in my presence.  I have reviewed the chart and agree that the record reflects my personal performance and is accurate and complete. Estelle Woods DO.     10/16/2019 2:58 PM             Clinical Impression:     1. History of atrial fibrillation    2. Hypertension                                   Estelle Woods DO  10/16/19 2051

## 2019-09-19 ENCOUNTER — TELEPHONE (OUTPATIENT)
Dept: FAMILY MEDICINE | Facility: CLINIC | Age: 74
End: 2019-09-19

## 2019-09-19 VITALS
TEMPERATURE: 98 F | OXYGEN SATURATION: 97 % | SYSTOLIC BLOOD PRESSURE: 139 MMHG | HEART RATE: 85 BPM | WEIGHT: 175 LBS | BODY MASS INDEX: 28.12 KG/M2 | HEIGHT: 66 IN | RESPIRATION RATE: 17 BRPM | DIASTOLIC BLOOD PRESSURE: 70 MMHG

## 2019-09-19 PROBLEM — I63.421 EMBOLIC STROKE INVOLVING RIGHT ANTERIOR CEREBRAL ARTERY: Status: ACTIVE | Noted: 2019-09-18

## 2019-09-19 PROBLEM — I63.411 EMBOLIC STROKE INVOLVING RIGHT MIDDLE CEREBRAL ARTERY: Status: ACTIVE | Noted: 2019-09-18

## 2019-09-19 PROBLEM — G45.9 TIA (TRANSIENT ISCHEMIC ATTACK): Status: ACTIVE | Noted: 2019-09-19

## 2019-09-19 LAB
ALBUMIN SERPL BCP-MCNC: 3.5 G/DL (ref 3.5–5.2)
ALBUMIN SERPL BCP-MCNC: 3.9 G/DL (ref 3.5–5.2)
ALP SERPL-CCNC: 56 U/L (ref 55–135)
ALP SERPL-CCNC: 61 U/L (ref 55–135)
ALT SERPL W/O P-5'-P-CCNC: 11 U/L (ref 10–44)
ALT SERPL W/O P-5'-P-CCNC: 11 U/L (ref 10–44)
ANION GAP SERPL CALC-SCNC: 11 MMOL/L (ref 8–16)
ANION GAP SERPL CALC-SCNC: 9 MMOL/L (ref 8–16)
APTT BLDCRRT: 31.4 SEC (ref 21–32)
ASCENDING AORTA: 3.11 CM
AST SERPL-CCNC: 17 U/L (ref 10–40)
AST SERPL-CCNC: 20 U/L (ref 10–40)
AV INDEX (PROSTH): 0.82
AV MEAN GRADIENT: 2 MMHG
AV PEAK GRADIENT: 3 MMHG
AV VALVE AREA: 2.57 CM2
AV VELOCITY RATIO: 0.71
BASOPHILS # BLD AUTO: 0.04 K/UL (ref 0–0.2)
BASOPHILS NFR BLD: 0.5 % (ref 0–1.9)
BILIRUB SERPL-MCNC: 0.6 MG/DL (ref 0.1–1)
BILIRUB SERPL-MCNC: 0.6 MG/DL (ref 0.1–1)
BILIRUB UR QL STRIP: NEGATIVE
BNP SERPL-MCNC: 243 PG/ML (ref 0–99)
BSA FOR ECHO PROCEDURE: 1.92 M2
BUN SERPL-MCNC: 15 MG/DL (ref 8–23)
BUN SERPL-MCNC: 16 MG/DL (ref 8–23)
CALCIUM SERPL-MCNC: 9.4 MG/DL (ref 8.7–10.5)
CALCIUM SERPL-MCNC: 9.7 MG/DL (ref 8.7–10.5)
CHLORIDE SERPL-SCNC: 105 MMOL/L (ref 95–110)
CHLORIDE SERPL-SCNC: 105 MMOL/L (ref 95–110)
CHOLEST SERPL-MCNC: 206 MG/DL (ref 120–199)
CHOLEST/HDLC SERPL: 3.7 {RATIO} (ref 2–5)
CK MB SERPL-MCNC: 3.9 NG/ML (ref 0.1–6.5)
CK MB SERPL-RTO: 6.4 % (ref 0–5)
CK SERPL-CCNC: 61 U/L (ref 20–180)
CLARITY UR REFRACT.AUTO: CLEAR
CO2 SERPL-SCNC: 25 MMOL/L (ref 23–29)
CO2 SERPL-SCNC: 26 MMOL/L (ref 23–29)
COLOR UR AUTO: YELLOW
CREAT SERPL-MCNC: 0.7 MG/DL (ref 0.5–1.4)
CREAT SERPL-MCNC: 0.7 MG/DL (ref 0.5–1.4)
CV ECHO LV RWT: 0.52 CM
DIFFERENTIAL METHOD: ABNORMAL
DOP CALC AO PEAK VEL: 0.82 M/S
DOP CALC AO VTI: 16.06 CM
DOP CALC LVOT AREA: 3.1 CM2
DOP CALC LVOT DIAMETER: 2 CM
DOP CALC LVOT PEAK VEL: 0.58 M/S
DOP CALC LVOT STROKE VOLUME: 41.35 CM3
DOP CALCLVOT PEAK VEL VTI: 13.17 CM
ECHO LV POSTERIOR WALL: 1.14 CM (ref 0.6–1.1)
EOSINOPHIL # BLD AUTO: 0.2 K/UL (ref 0–0.5)
EOSINOPHIL NFR BLD: 2.3 % (ref 0–8)
ERYTHROCYTE [DISTWIDTH] IN BLOOD BY AUTOMATED COUNT: 12.4 % (ref 11.5–14.5)
EST. GFR  (AFRICAN AMERICAN): >60 ML/MIN/1.73 M^2
EST. GFR  (AFRICAN AMERICAN): >60 ML/MIN/1.73 M^2
EST. GFR  (NON AFRICAN AMERICAN): >60 ML/MIN/1.73 M^2
EST. GFR  (NON AFRICAN AMERICAN): >60 ML/MIN/1.73 M^2
ESTIMATED AVG GLUCOSE: 103 MG/DL (ref 68–131)
FRACTIONAL SHORTENING: 34 % (ref 28–44)
GLUCOSE SERPL-MCNC: 87 MG/DL (ref 70–110)
GLUCOSE SERPL-MCNC: 98 MG/DL (ref 70–110)
GLUCOSE UR QL STRIP: NEGATIVE
HBA1C MFR BLD HPLC: 5.2 % (ref 4–5.6)
HCT VFR BLD AUTO: 46.1 % (ref 37–48.5)
HDLC SERPL-MCNC: 55 MG/DL (ref 40–75)
HDLC SERPL: 26.7 % (ref 20–50)
HGB BLD-MCNC: 14.2 G/DL (ref 12–16)
HGB UR QL STRIP: ABNORMAL
IMM GRANULOCYTES # BLD AUTO: 0.02 K/UL (ref 0–0.04)
IMM GRANULOCYTES NFR BLD AUTO: 0.3 % (ref 0–0.5)
INR PPP: 1.2 (ref 0.8–1.2)
INTERVENTRICULAR SEPTUM: 0.89 CM (ref 0.6–1.1)
KETONES UR QL STRIP: ABNORMAL
LA MAJOR: 4.88 CM
LA MINOR: 4.97 CM
LA WIDTH: 3.63 CM
LDLC SERPL CALC-MCNC: 138 MG/DL (ref 63–159)
LEFT ATRIUM SIZE: 3.5 CM
LEFT ATRIUM VOLUME INDEX: 28.1 ML/M2
LEFT ATRIUM VOLUME: 53.18 CM3
LEFT INTERNAL DIMENSION IN SYSTOLE: 2.86 CM (ref 2.1–4)
LEFT VENTRICLE DIASTOLIC VOLUME INDEX: 45.35 ML/M2
LEFT VENTRICLE DIASTOLIC VOLUME: 85.7 ML
LEFT VENTRICLE MASS INDEX: 79 G/M2
LEFT VENTRICLE SYSTOLIC VOLUME INDEX: 16.4 ML/M2
LEFT VENTRICLE SYSTOLIC VOLUME: 31.02 ML
LEFT VENTRICULAR INTERNAL DIMENSION IN DIASTOLE: 4.36 CM (ref 3.5–6)
LEFT VENTRICULAR MASS: 148.73 G
LEUKOCYTE ESTERASE UR QL STRIP: ABNORMAL
LYMPHOCYTES # BLD AUTO: 2.1 K/UL (ref 1–4.8)
LYMPHOCYTES NFR BLD: 27.6 % (ref 18–48)
MAGNESIUM SERPL-MCNC: 1.9 MG/DL (ref 1.6–2.6)
MCH RBC QN AUTO: 30.4 PG (ref 27–31)
MCHC RBC AUTO-ENTMCNC: 30.8 G/DL (ref 32–36)
MCV RBC AUTO: 99 FL (ref 82–98)
MICROSCOPIC COMMENT: ABNORMAL
MONOCYTES # BLD AUTO: 0.5 K/UL (ref 0.3–1)
MONOCYTES NFR BLD: 6.9 % (ref 4–15)
NEUTROPHILS # BLD AUTO: 4.8 K/UL (ref 1.8–7.7)
NEUTROPHILS NFR BLD: 62.4 % (ref 38–73)
NITRITE UR QL STRIP: NEGATIVE
NONHDLC SERPL-MCNC: 151 MG/DL
NRBC BLD-RTO: 0 /100 WBC
PH UR STRIP: 7 [PH] (ref 5–8)
PHOSPHATE SERPL-MCNC: 2.4 MG/DL (ref 2.7–4.5)
PISA TR MAX VEL: 2.21 M/S
PLATELET # BLD AUTO: 230 K/UL (ref 150–350)
PMV BLD AUTO: 9.9 FL (ref 9.2–12.9)
POCT GLUCOSE: 100 MG/DL (ref 70–110)
POTASSIUM SERPL-SCNC: 4.3 MMOL/L (ref 3.5–5.1)
POTASSIUM SERPL-SCNC: 4.4 MMOL/L (ref 3.5–5.1)
PROT SERPL-MCNC: 6.7 G/DL (ref 6–8.4)
PROT SERPL-MCNC: 7.5 G/DL (ref 6–8.4)
PROT UR QL STRIP: NEGATIVE
PROTHROMBIN TIME: 11.9 SEC (ref 9–12.5)
RA MAJOR: 4.88 CM
RA PRESSURE: 3 MMHG
RA WIDTH: 3.47 CM
RBC # BLD AUTO: 4.67 M/UL (ref 4–5.4)
RBC #/AREA URNS AUTO: 24 /HPF (ref 0–4)
RIGHT VENTRICULAR END-DIASTOLIC DIMENSION: 2.63 CM
SINUS: 3.07 CM
SODIUM SERPL-SCNC: 140 MMOL/L (ref 136–145)
SODIUM SERPL-SCNC: 141 MMOL/L (ref 136–145)
SP GR UR STRIP: 1.01 (ref 1–1.03)
SQUAMOUS #/AREA URNS AUTO: 1 /HPF
STJ: 2.94 CM
TR MAX PG: 20 MMHG
TRICUSPID ANNULAR PLANE SYSTOLIC EXCURSION: 1.9 CM
TRIGL SERPL-MCNC: 65 MG/DL (ref 30–150)
TROPONIN I SERPL DL<=0.01 NG/ML-MCNC: <0.006 NG/ML (ref 0–0.03)
TSH SERPL DL<=0.005 MIU/L-ACNC: 0.92 UIU/ML (ref 0.4–4)
TV REST PULMONARY ARTERY PRESSURE: 23 MMHG
URN SPEC COLLECT METH UR: ABNORMAL
WBC # BLD AUTO: 7.69 K/UL (ref 3.9–12.7)
WBC #/AREA URNS AUTO: 4 /HPF (ref 0–5)

## 2019-09-19 PROCEDURE — 11000001 HC ACUTE MED/SURG PRIVATE ROOM

## 2019-09-19 PROCEDURE — 25500020 PHARM REV CODE 255: Performed by: NURSE PRACTITIONER

## 2019-09-19 PROCEDURE — 85025 COMPLETE CBC W/AUTO DIFF WBC: CPT

## 2019-09-19 PROCEDURE — 99239 PR HOSPITAL DISCHARGE DAY,>30 MIN: ICD-10-PCS | Mod: ,,, | Performed by: PSYCHIATRY & NEUROLOGY

## 2019-09-19 PROCEDURE — 25000003 PHARM REV CODE 250: Performed by: NURSE PRACTITIONER

## 2019-09-19 PROCEDURE — 80053 COMPREHEN METABOLIC PANEL: CPT

## 2019-09-19 PROCEDURE — 36415 COLL VENOUS BLD VENIPUNCTURE: CPT

## 2019-09-19 PROCEDURE — 99239 HOSP IP/OBS DSCHRG MGMT >30: CPT | Mod: ,,, | Performed by: PSYCHIATRY & NEUROLOGY

## 2019-09-19 RX ORDER — ATORVASTATIN CALCIUM 40 MG/1
40 TABLET, FILM COATED ORAL DAILY
Qty: 90 TABLET | Refills: 3 | Status: SHIPPED | OUTPATIENT
Start: 2019-09-20 | End: 2019-09-19

## 2019-09-19 RX ORDER — CIPROFLOXACIN 500 MG/1
500 TABLET ORAL EVERY 12 HOURS
Status: DISCONTINUED | OUTPATIENT
Start: 2019-09-19 | End: 2019-09-19

## 2019-09-19 RX ORDER — CIPROFLOXACIN 500 MG/1
500 TABLET ORAL EVERY 12 HOURS
Qty: 14 TABLET | Refills: 0 | Status: SHIPPED | OUTPATIENT
Start: 2019-09-19 | End: 2019-09-19 | Stop reason: HOSPADM

## 2019-09-19 RX ORDER — SULFAMETHOXAZOLE AND TRIMETHOPRIM 800; 160 MG/1; MG/1
1 TABLET ORAL 2 TIMES DAILY
Qty: 14 TABLET | Refills: 0 | Status: SHIPPED | OUTPATIENT
Start: 2019-09-19 | End: 2021-06-14 | Stop reason: SDUPTHER

## 2019-09-19 RX ORDER — ATORVASTATIN CALCIUM 40 MG/1
40 TABLET, FILM COATED ORAL DAILY
Qty: 90 TABLET | Refills: 3 | Status: SHIPPED | OUTPATIENT
Start: 2019-09-20 | End: 2021-04-26 | Stop reason: SDUPTHER

## 2019-09-19 RX ADMIN — ATORVASTATIN CALCIUM 40 MG: 20 TABLET, FILM COATED ORAL at 08:09

## 2019-09-19 RX ADMIN — IOHEXOL 75 ML: 350 INJECTION, SOLUTION INTRAVENOUS at 01:09

## 2019-09-19 NOTE — ED NOTES
Pt brought to ER by EMS as transfer from [place] for hypertension evaluation. 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 30 min. Pt complains of mild temporal HA and increased urination. Pt denies current change in voice, chest pain, SOB, change in vision, N/V, abdominal pain.

## 2019-09-19 NOTE — H&P
[facility]  Vascular Neurology  Comprehensive Stroke Center  History & Physical    Inpatient consult to Vascular (Stroke) Neurology  Consult performed by: Leslie Espinosa, RL, NP  Consult ordered by: Rosa M Rice MD  Reason for consult: transfer, TIA        Assessment/Plan:     Episode of transient neurologic symptoms  Gale Fernández is a 74 y.o. female with a significant medical history of HTN, HLD, pre-DM, A-fib on Xarelto, anxiety who presents to the hospital complaining of left hand weakness that lasted ~30 min.    Antithrombotics for secondary stroke prevention: Anticoagulants: Rivaroxaban 20 mg daily    Statins for secondary stroke prevention and hyperlipidemia, if present:   Statins: Atorvastatin- 40 mg daily    Aggressive risk factor modification: HTN, DM, HLD, Obesity, A-Fib     Rehab efforts: The patient has been evaluated by a stroke team provider and the therapy needs have been fully considered based off the presenting complaints and exam findings. The following therapy evaluations are needed: None    Diagnostics ordered/pending: CTA Head to assess vasculature , CTA Neck/Arch to assess vasculature, HgbA1C to assess blood glucose levels, Lipid Profile to assess cholesterol levels, MRI head without contrast to assess brain parenchyma, TTE to assess cardiac function/status , TSH to assess thyroid function    VTE prophylaxis: None: Reason for No Pharmacological VTE Prophylaxis: Currently on anticoagulation, Mechanical prophylaxis: Place SCDs    BP parameters: TIA: SBP <220 until imaging confirmation of no infarct         Atrial fibrillation  -Stroke risk factor.  Patient is currently on Xarelto.  Endorses missing one dose today.  -Continue Xarelto    Prediabetes  -Stroke risk factor.  A1c pending.  -Monitor    Borderline hyperlipidemia  -Stroke risk factor.  LDL pending.  -Atorvastatin 40 mg daily    History of hypertension  -Stroke risk factor.  Patient states she has been taking  12.5 mg of Metoprolol 2/2 feeling her BP was too low.  -Hold metoprolol for now until no stroke confirmed on imaging        STROKE DOCUMENTATION          NIH Scale:  Interval: baseline  1a. Level of Consciousness: 0-->Alert, keenly responsive  1b. LOC Questions: 0-->Answers both questions correctly  1c. LOC Commands: 0-->Performs both tasks correctly  2. Best Gaze: 0-->Normal  3. Visual: 0-->No visual loss  4. Facial Palsy: 0-->Normal symmetrical movements  5a. Motor Arm, Left: 0-->No drift, limb holds 90 (or 45) degrees for full 10 secs  5b. Motor Arm, Right: 0-->No drift, limb holds 90 (or 45) degrees for full 10 secs  6a. Motor Leg, Left: 0-->No drift, leg holds 30 degree position for full 5 secs  6b. Motor Leg, Right: 0-->No drift, leg holds 30 degree position for full 5 secs  7. Limb Ataxia: 0-->Absent  8. Sensory: 0-->Normal, no sensory loss  9. Best Language: 0-->No aphasia, normal  10. Dysarthria: 0-->Normal  11. Extinction and Inattention (formerly Neglect): 0-->No abnormality  Total (NIH Stroke Scale): 0     Modified Tigist Score: 0  Warren Coma Scale:15   ABCD2 Score:    [id number] Score:   HAS -BLED Score:3  ICH Score:   Hunt & Rodríguez Classification:      Thrombolysis Candidate? No, Out of window , symptoms rapidly resolved.  NIH 0.  Patient ambulatory in the ED w/o assistance or difficulty.    Delays to Thrombolysis?  No    Interventional Revascularization Candidate?   Is the patient eligible for mechanical endovascular reperfusion (TESS)?  No; No significant neurological deficit    Hemorrhagic change of an Ischemic Stroke: Does this patient have an ischemic stroke with hemorrhagic changes? No         Subjective:     History of Present Illness:  Gale Fernández is a 74 y.o. female with a significant medical history of HTN, HLD, pre-DM, A-fib on Xarelto, anxiety who presents to the hospital complaining of left hand weakness that lasted ~30 min.  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  At that time the patient noticed her voice had changed.  The school nurse measured the patient's BP and it was reportedly 182/137.  Currently her symptoms have resolved.  The patient denies having HA, CP, speech difficulty, change in vision, or N/V.  She currently endorses dizziness and states she has vertigo in the mornings with nausea that resolves spontaneously.   ED work up included POCT testing that revealed findings concerning for UTI and elevated BNP.  CTH negative for acute findings.  Will admit to Vascular Neurology for further evaluation.                    Past Medical History:   Diagnosis Date    Atrial fibrillation     Followed by EP cardiology    Borderline hyperlipidemia     Cataract     History of abnormal Pap smear     More than 20 years ago; status post colposcopy    History of anxiety disorder     History of hypertension     Currently doing okay off medication    History of ventricular tachycardia     (PSVT) Status post ablation    Hypertension     Osteopenia     Refuses medication    Overweight(278.02)      Past Surgical History:   Procedure Laterality Date     SECTION, LOW TRANSVERSE      X1    left knee meniscus surgery      neck tuck and liposuction      PSVT ablation      Skin cancer removal on the left eye       Family History   Problem Relation Age of Onset    Heart disease Mother     Cancer Father         nasopharynx    Breast cancer Cousin     Hypertension Sister     Diabetes Sister     Coronary artery disease Brother         s/p stenting    Hypertension Brother     Rheum arthritis Sister     Hypertension Brother     Hypertension Brother     Heart disease Brother         s/p CABG    Hypertension Brother     Coronary artery disease Brother         s/p stenting    No Known Problems Maternal Grandmother     No Known Problems Maternal Grandfather     No Known Problems Paternal Grandmother     No  Known Problems Paternal Grandfather     No Known Problems Maternal Aunt     No Known Problems Maternal Uncle     No Known Problems Paternal Aunt     No Known Problems Paternal Uncle     Colon cancer Neg Hx     Amblyopia Neg Hx     Blindness Neg Hx     Cataracts Neg Hx     Glaucoma Neg Hx     Macular degeneration Neg Hx     Retinal detachment Neg Hx     Strabismus Neg Hx     Stroke Neg Hx     Thyroid disease Neg Hx      Social History     Tobacco Use    Smoking status: Former Smoker     Types: Cigarettes    Smokeless tobacco: Never Used   Substance Use Topics    Alcohol use: Yes     Comment: Rarely    Drug use: No     Review of patient's allergies indicates:   Allergen Reactions    Flecainide Other (See Comments)     Other reaction(s): worsening arrythmia       Medications: I have reviewed the current medication administration record.    No current facility-administered medications on file prior to encounter.      Current Outpatient Medications on File Prior to Encounter   Medication Sig Dispense Refill    ALPRAZolam (XANAX) 0.25 MG tablet TAKE 1 TABLET BY MOUTH AT BEDTIME AS NEEDED FOR ANXIETRY 30 tablet 0    dronedarone (MULTAQ) 400 mg Tab TAKE 1 TABLET (400 MG TOTAL) BY MOUTH 2 (TWO) TIMES DAILY WITH MEALS. 60 tablet 1    lidocaine HCL 4% (XYLOCAINE) 4 % (40 mg/mL) external solution Apply 4 mLs topically as needed. 50 mL 2    lidocaine-prilocaine (EMLA) cream Apply topically as needed. 25 g 0    metoprolol succinate (TOPROL-XL) 25 MG 24 hr tablet Take 0.5 tablets (12.5 mg total) by mouth once daily. TAKE 1 TABLET (25 MG TOTAL) BY MOUTH ONCE DAILY. 90 tablet 3    rivaroxaban (XARELTO) 20 mg Tab Take 1 tablet (20 mg total) by mouth every evening. 30 tablet 11    TAZORAC 0.1 % cream APPLY TO AFFECTED AREA EVERY DAY AT BEDTIME  6         Review of Systems   Constitutional: Negative for chills, fatigue and fever.   HENT: Negative for drooling and trouble swallowing.    Eyes: Positive for  visual disturbance. Negative for photophobia, pain and discharge.   Respiratory: Negative for cough, chest tightness, shortness of breath and wheezing.    Cardiovascular: Negative for chest pain, palpitations and leg swelling.   Gastrointestinal: Positive for nausea. Negative for abdominal pain, constipation, diarrhea and vomiting.   Endocrine: Negative for cold intolerance and heat intolerance.   Genitourinary: Negative for dysuria, frequency, hematuria and urgency.   Musculoskeletal: Negative for gait problem, neck pain and neck stiffness.   Skin: Negative for rash and wound.   Allergic/Immunologic: Negative for environmental allergies and food allergies.   Neurological: Positive for dizziness (currently resolved), speech difficulty (currently resolved) and weakness (currently resolved). Negative for tremors, light-headedness, numbness and headaches.   Hematological: Negative for adenopathy. Does not bruise/bleed easily.   Psychiatric/Behavioral: Negative for agitation, confusion and hallucinations.     Objective:     Vital Signs (Most Recent):  Temp: 98.4 °F (36.9 °C) (09/18/19 1812)  Pulse: 72 (09/18/19 2302)  Resp: 18 (09/18/19 1812)  BP: (!) 159/70 (09/18/19 2302)  SpO2: 96 % (09/18/19 2302)    Vital Signs Range (Last 24H):  Temp:  [97.8 °F (36.6 °C)-98.8 °F (37.1 °C)]   Pulse:  []   Resp:  [12-18]   BP: (134-218)/()   SpO2:  [96 %-99 %]     Physical Exam    Neurological Exam:   LOC: alert  Attention Span: Good   Language: No aphasia  Articulation: No dysarthria  Orientation: Person, Place, Time   Visual Fields: Full  EOM (CN III, IV, VI): Full/intact  Pupils (CN II, III): PERRL  Facial Sensation (CN V): Normal  Facial Movement (CN VII): Symmetric facial expression    Motor: Arm left  Normal 5/5  Leg left  Normal 5/5  Arm right  Normal 5/5  Leg right Normal 5/5  Cebellar: No evidence of appendicular or axial ataxia  Sensation: Intact to light touch, temperature and vibration      Laboratory:  CMP:  No results for input(s): GLUCOSE, CALCIUM, ALBUMIN, PROT, NA, K, CO2, CL, BUN, CREATININE, ALKPHOS, ALT, AST, BILITOT in the last 24 hours.  CBC: No results for input(s): WBC, RBC, HGB, HCT, PLT, MCV, MCH, MCHC in the last 168 hours.  Lipid Panel: No results for input(s): CHOL, LDLCALC, HDL, TRIG in the last 168 hours.  Coagulation: No results for input(s): PT, INR, APTT in the last 168 hours.  Hgb A1C: No results for input(s): HGBA1C in the last 168 hours.  TSH: No results for input(s): TSH in the last 168 hours.    Diagnostic Results:      Brain imaging:  [facility] 09/18/2019 1. No acute intracranial abnormalities noting sequela of chronic microvascular ischemic change and senescent change.  2. Bilateral mastoid effusions.    MRI Brain pending    Vessel Imaging:  CTA Head and Neck pending    Cardiac Evaluation:   TTE pending        Leslie Espinosa, RL, NP  [facility]  Department of Vascular Neurology   [facility]

## 2019-09-19 NOTE — NURSING
Patient has arrived on the unit via wheelchair. She is accompanied by transport staff and is in stable condition.

## 2019-09-19 NOTE — PLAN OF CARE
09/19/19 1320   Final Note   Assessment Type Final Discharge Note   Anticipated Discharge Disposition Home   What phone number can be called within the next 1-3 days to see how you are doing after discharge? [phone]   Hospital Follow Up  Appt(s) scheduled? Yes   Discharge plans and expectations educations in teach back method with documentation complete? Yes   Right Care Referral Info   Post Acute Recommendation No Care

## 2019-09-19 NOTE — ED PROVIDER NOTES
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 30 min.  /137  by school nurse.  All symptoms are resolved at this time, pt c/o feeling tired.     74-year-old female past medical history of atrial fibrillation, hypertension, anxiety, presenting as transfer for transient new neuro deficit with hypertension.  Patient reports that 30 min prior to arrival at outside facility she experienced an episode of left hand weakness, dropping cards that were in her hand, with simultaneous voice lowering (of an octave).  She reports symptoms resolved within several minutes, however she was noted to be hypertensive at her school with systolic over 180s, stating that she is typically in the 140s.  At outside hospital she was noted to be hypertensive over 200s, given multiple doses of labetalol.  She reports that she has not had any further symptoms since presentation at outside hospital, denying headache, vision changes, numbness/tingling or weakness in extremities, shortness of breath, confusion, slurring speech.         Review of patient's allergies indicates:   Allergen Reactions    Flecainide Other (See Comments)     Other reaction(s): worsening arrythmia     Past Medical History:   Diagnosis Date    Atrial fibrillation     Followed by EP cardiology    Borderline hyperlipidemia     Cataract     History of abnormal Pap smear     More than 20 years ago; status post colposcopy    History of anxiety disorder     History of hypertension     Currently doing okay off medication    History of ventricular tachycardia     (PSVT) Status post ablation    Hypertension     Osteopenia     Refuses medication    Overweight(278.02)      Past Surgical History:   Procedure Laterality Date      SECTION, LOW TRANSVERSE      X1    left knee meniscus surgery  2012    neck tuck and liposuction  2012    PSVT ablation      Skin cancer removal on the left eye       Family History   Problem Relation Age of Onset    Heart disease Mother     Cancer Father         nasopharynx    Breast cancer Cousin     Hypertension Sister     Diabetes Sister     Coronary artery disease Brother         s/p stenting    Hypertension Brother     Rheum arthritis Sister     Hypertension Brother     Hypertension Brother     Heart disease Brother         s/p CABG    Hypertension Brother     Coronary artery disease Brother         s/p stenting    No Known Problems Maternal Grandmother     No Known Problems Maternal Grandfather     No Known Problems Paternal Grandmother     No Known Problems Paternal Grandfather     No Known Problems Maternal Aunt     No Known Problems Maternal Uncle     No Known Problems Paternal Aunt     No Known Problems Paternal Uncle     Colon cancer Neg Hx     Amblyopia Neg Hx     Blindness Neg Hx     Cataracts Neg Hx     Glaucoma Neg Hx     Macular degeneration Neg Hx     Retinal detachment Neg Hx     Strabismus Neg Hx     Stroke Neg Hx     Thyroid disease Neg Hx      Social History     Tobacco Use    Smoking status: Former Smoker     Types: Cigarettes    Smokeless tobacco: Never Used   Substance Use Topics    Alcohol use: Yes     Comment: Rarely    Drug use: No     Review of Systems   Constitutional: Negative for chills and fever.   HENT: Positive for voice change. Negative for tinnitus and trouble swallowing.    Eyes: Negative for pain and visual disturbance.        Neg vision changes   Respiratory: Negative for cough and shortness of breath.    Cardiovascular: Negative for chest pain and leg swelling.   Gastrointestinal: Negative for abdominal pain, nausea and vomiting.        Neg changes in stool   Endocrine: Negative for polyuria.   Genitourinary:        Neg changes in  urination   Musculoskeletal: Negative for arthralgias, joint swelling, neck pain and neck stiffness.   Skin: Negative for color change and rash.   Allergic/Immunologic: Negative for immunocompromised state.   Neurological: Positive for weakness. Negative for dizziness, seizures, facial asymmetry, speech difficulty, light-headedness, numbness and headaches.   Hematological: Does not bruise/bleed easily.   Psychiatric/Behavioral: Negative for confusion and hallucinations.       Physical Exam     Initial Vitals [09/18/19 1227]   BP Pulse Resp Temp SpO2   (!) 183/88 110 18 98.5 °F (36.9 °C) 99 %      MAP       --         Physical Exam    Nursing note and vitals reviewed.  Constitutional: She appears well-developed and well-nourished. She is not diaphoretic. No distress.   HENT:   Head: Normocephalic and atraumatic.   Nose: Nose normal.   Eyes: EOM are normal. Pupils are equal, round, and reactive to light. No scleral icterus.   Neck: Normal range of motion. Neck supple.   Cardiovascular: Normal rate, regular rhythm and intact distal pulses.   No murmur heard.  Pulmonary/Chest: Breath sounds normal. No stridor. No respiratory distress. She has no wheezes. She has no rhonchi. She has no rales. She exhibits no tenderness.   Abdominal: Soft. Bowel sounds are normal. She exhibits no distension. There is no tenderness. There is no rebound.   Musculoskeletal: Normal range of motion. She exhibits no edema or tenderness.   Neurological: She is alert and oriented to person, place, and time. She has normal strength. No cranial nerve deficit or sensory deficit. GCS score is 15. GCS eye subscore is 4. GCS verbal subscore is 5. GCS motor subscore is 6.   Skin: Skin is warm and dry. Capillary refill takes less than 2 seconds. No rash noted.   Psychiatric: She has a normal mood and affect. Her behavior is normal. Judgment and thought content normal.         ED Course   Procedures  Labs Reviewed   POCT URINALYSIS W/O SCOPE - Abnormal;  Notable for the following components:       Result Value    Glucose, UA Negative (*)     Bilirubin, UA Negative (*)     Ketones, UA Negative (*)     Spec Grav UA >=1.030 (*)     Blood, UA 1+ (*)     Protein, UA Negative (*)     Nitrite, UA Negative (*)     Leukocytes, UA 1+ (*)     All other components within normal limits   POCT B-TYPE NATRIURETIC PEPTIDE (BNP) - Abnormal; Notable for the following components:    POC B-Type Natriuretic Peptide 237 (*)     All other components within normal limits   CULTURE, URINE   TROPONIN ISTAT   POCT CBC   POCT URINALYSIS W/O SCOPE   POCT CMP   POCT PROTIME-INR   POCT TROPONIN   POCT B-TYPE NATRIURETIC PEPTIDE (BNP)   ISTAT PROCEDURE   POCT CMP          Imaging Results          X-Ray Chest PA And Lateral (Final result)  Result time 09/18/19 13:30:41    Final result by Papi Gonzales MD (09/18/19 13:30:41)                 Impression:      No acute cardiopulmonary processes.      Electronically signed by: Papi Gonzales MD  Date:    09/18/2019  Time:    13:30             Narrative:    EXAMINATION:  XR CHEST PA AND LATERAL    CLINICAL HISTORY:  Hypertension;    TECHNIQUE:  PA and lateral views of the chest were performed.    COMPARISON:  Chest 03/06/2013    FINDINGS:  Cardiomediastinal silhouette remains within normal limits.  There is slight tortuosity of the descending thoracic aorta when compared to the previous study.    Lungs are well inflated.  There is no lobar consolidations or pneumothorax or pulmonary vascular congestion or pleural effusions.  There are cardiac monitoring leads over the chest.  There is generalized osteopenia.  Mild spondylotic changes in the dorsal spine.                               CT Head Without Contrast (Final result)  Result time 09/18/19 13:31:36    Final result by Vinh Ramirez MD (09/18/19 13:31:36)                 Impression:      1. No acute intracranial abnormalities noting sequela of chronic microvascular ischemic change and senescent  change.  2. Bilateral mastoid effusions.      Electronically signed by: Vinh Ramirez MD  Date:    09/18/2019  Time:    13:31             Narrative:    EXAMINATION:  CT HEAD WITHOUT CONTRAST    CLINICAL HISTORY:  Stroke;    TECHNIQUE:  Low dose axial images were obtained through the head.  Coronal and sagittal reformations were also performed. Contrast was not administered.    COMPARISON:  08/29/2009    FINDINGS:  There is generalized cerebral volume loss.  There is hypoattenuation in a periventricular fashion, likely sequela of chronic microvascular ischemic change.  There is no evidence of acute major vascular territory infarct, hemorrhage, or mass.  There is no hydrocephalus.  There are no abnormal extra-axial fluid collections.  There is partial opacification of the mastoid air cells bilaterally, otherwise the visualized portions of the paranasal sinuses and mastoid air cells are clear, and there is no evidence of calvarial fracture.  The visualized soft tissues are unremarkable.                                 Medical Decision Making:   History:   I obtained history from: someone other than patient.  Old Medical Records: I decided to obtain old medical records.  Initial Assessment:   Pt NAD, mildly hypertensive to 180s, however pt has not taken her metoprolol. Pt also states she missed a dose of her Xarelto.   Differential Diagnosis:   HTN emergency, ICH, CVA, TIA, hypoglycemia, metabolic abnormality, electrolyte abnormality  Clinical Tests:   Lab Tests: Ordered and Reviewed  Radiological Study: Ordered and Reviewed  ED Management:  Will d/w [facility] Neuro for stroke evaluation.                       Clinical Impression:       ICD-10-CM ICD-9-CM   1. Embolic stroke involving right middle cerebral artery I63.411 434.11   2. History of atrial fibrillation Z86.79 V12.59   3. Hypertension I10 401.9   4. TIA (transient ischemic attack) G45.9 435.9   5. Atrial fibrillation with RVR I48.91 427.31   6. Atrial  fibrillation I48.91 427.31   7. Acute UTI N39.0 599.0   8. Hypertensive urgency I16.0 401.9   9. Episode of transient neurologic symptoms R29.90 781.99                                Rosa M Rice MD  09/21/19 1101

## 2019-09-19 NOTE — PROVIDER PROGRESS NOTES - EMERGENCY DEPT.
Encounter Date: 9/18/2019    ED Physician Progress Notes           ED Attending Sign-out Progress Note:  Patient signed out to me at shift change by Dr. Rice to f/u vascular neurology dispo and overall disposition.    On chart review, it appears pt was discussed with Neuro CC attending who recommended ED eval by Neuro CC and MICU.   However, pt remained only slightly hypertensive here.  Discussed with vascular neurology who admitted pt for possible TIA work up.    ED Clinical Impression:  1. TIA (transient ischemic attack)    2. History of atrial fibrillation    3. Hypertension    4. Atrial fibrillation with RVR    5. Atrial fibrillation    6. Acute UTI    7. Hypertensive urgency    8. Episode of transient neurologic symptoms    9. Episode of transient neurologic symptoms

## 2019-09-19 NOTE — ASSESSMENT & PLAN NOTE
-Stroke risk factor.    - Patient states she has been taking 12.5 mg of Metoprolol 2/2 feeling her BP was too low.   - hypertensive while inpatient, resume home metoprolol, check BP daily at home, f/u with PCP

## 2019-09-19 NOTE — ASSESSMENT & PLAN NOTE
Gale Fernández is a 74 y.o. female with a significant medical history of HTN, HLD, pre-DM, A-fib on Xarelto, anxiety who presents to the hospital complaining of left hand weakness that lasted ~30 min.    Antithrombotics for secondary stroke prevention: Anticoagulants: Rivaroxaban 20 mg daily    Statins for secondary stroke prevention and hyperlipidemia, if present:   Statins: Atorvastatin- 40 mg daily    Aggressive risk factor modification: HTN, DM, HLD, Obesity, A-Fib     Rehab efforts: The patient has been evaluated by a stroke team provider and the therapy needs have been fully considered based off the presenting complaints and exam findings. The following therapy evaluations are needed: None    Diagnostics ordered/pending: CTA Head to assess vasculature , CTA Neck/Arch to assess vasculature, HgbA1C to assess blood glucose levels, Lipid Profile to assess cholesterol levels, MRI head without contrast to assess brain parenchyma, TTE to assess cardiac function/status , TSH to assess thyroid function    VTE prophylaxis: None: Reason for No Pharmacological VTE Prophylaxis: Currently on anticoagulation, Mechanical prophylaxis: Place SCDs    BP parameters: TIA: SBP <220 until imaging confirmation of no infarct

## 2019-09-19 NOTE — ASSESSMENT & PLAN NOTE
-Stroke risk factor.  Patient is currently on Xarelto.  Endorses missing one dose 9/18.  -Continue Xarelto

## 2019-09-19 NOTE — SUBJECTIVE & OBJECTIVE
Past Medical History:   Diagnosis Date    Atrial fibrillation     Followed by EP cardiology    Borderline hyperlipidemia     Cataract     History of abnormal Pap smear     More than 20 years ago; status post colposcopy    History of anxiety disorder     History of hypertension     Currently doing okay off medication    History of ventricular tachycardia     (PSVT) Status post ablation    Hypertension     Osteopenia     Refuses medication    Overweight(278.02)      Past Surgical History:   Procedure Laterality Date     SECTION, LOW TRANSVERSE      X1    left knee meniscus surgery      neck tuck and liposuction      PSVT ablation      Skin cancer removal on the left eye       Family History   Problem Relation Age of Onset    Heart disease Mother     Cancer Father         nasopharynx    Breast cancer Cousin     Hypertension Sister     Diabetes Sister     Coronary artery disease Brother         s/p stenting    Hypertension Brother     Rheum arthritis Sister     Hypertension Brother     Hypertension Brother     Heart disease Brother         s/p CABG    Hypertension Brother     Coronary artery disease Brother         s/p stenting    No Known Problems Maternal Grandmother     No Known Problems Maternal Grandfather     No Known Problems Paternal Grandmother     No Known Problems Paternal Grandfather     No Known Problems Maternal Aunt     No Known Problems Maternal Uncle     No Known Problems Paternal Aunt     No Known Problems Paternal Uncle     Colon cancer Neg Hx     Amblyopia Neg Hx     Blindness Neg Hx     Cataracts Neg Hx     Glaucoma Neg Hx     Macular degeneration Neg Hx     Retinal detachment Neg Hx     Strabismus Neg Hx     Stroke Neg Hx     Thyroid disease Neg Hx      Social History     Tobacco Use    Smoking status: Former Smoker     Types: Cigarettes    Smokeless tobacco: Never Used   Substance Use Topics    Alcohol use: Yes     Comment:  Rarely    Drug use: No     Review of patient's allergies indicates:   Allergen Reactions    Flecainide Other (See Comments)     Other reaction(s): worsening arrythmia       Medications: I have reviewed the current medication administration record.    No current facility-administered medications on file prior to encounter.      Current Outpatient Medications on File Prior to Encounter   Medication Sig Dispense Refill    ALPRAZolam (XANAX) 0.25 MG tablet TAKE 1 TABLET BY MOUTH AT BEDTIME AS NEEDED FOR ANXIETRY 30 tablet 0    dronedarone (MULTAQ) 400 mg Tab TAKE 1 TABLET (400 MG TOTAL) BY MOUTH 2 (TWO) TIMES DAILY WITH MEALS. 60 tablet 1    lidocaine HCL 4% (XYLOCAINE) 4 % (40 mg/mL) external solution Apply 4 mLs topically as needed. 50 mL 2    lidocaine-prilocaine (EMLA) cream Apply topically as needed. 25 g 0    metoprolol succinate (TOPROL-XL) 25 MG 24 hr tablet Take 0.5 tablets (12.5 mg total) by mouth once daily. TAKE 1 TABLET (25 MG TOTAL) BY MOUTH ONCE DAILY. 90 tablet 3    rivaroxaban (XARELTO) 20 mg Tab Take 1 tablet (20 mg total) by mouth every evening. 30 tablet 11    TAZORAC 0.1 % cream APPLY TO AFFECTED AREA EVERY DAY AT BEDTIME  6         Review of Systems   Constitutional: Negative for chills, fatigue and fever.   HENT: Negative for drooling and trouble swallowing.    Eyes: Positive for visual disturbance. Negative for photophobia, pain and discharge.   Respiratory: Negative for cough, chest tightness, shortness of breath and wheezing.    Cardiovascular: Negative for chest pain, palpitations and leg swelling.   Gastrointestinal: Positive for nausea. Negative for abdominal pain, constipation, diarrhea and vomiting.   Endocrine: Negative for cold intolerance and heat intolerance.   Genitourinary: Negative for dysuria, frequency, hematuria and urgency.   Musculoskeletal: Negative for gait problem, neck pain and neck stiffness.   Skin: Negative for rash and wound.   Allergic/Immunologic: Negative  for environmental allergies and food allergies.   Neurological: Positive for dizziness (currently resolved), speech difficulty (currently resolved) and weakness (currently resolved). Negative for tremors, light-headedness, numbness and headaches.   Hematological: Negative for adenopathy. Does not bruise/bleed easily.   Psychiatric/Behavioral: Negative for agitation, confusion and hallucinations.     Objective:     Vital Signs (Most Recent):  Temp: 98.4 °F (36.9 °C) (09/18/19 1812)  Pulse: 72 (09/18/19 2302)  Resp: 18 (09/18/19 1812)  BP: (!) 159/70 (09/18/19 2302)  SpO2: 96 % (09/18/19 2302)    Vital Signs Range (Last 24H):  Temp:  [97.8 °F (36.6 °C)-98.8 °F (37.1 °C)]   Pulse:  []   Resp:  [12-18]   BP: (134-218)/()   SpO2:  [96 %-99 %]     Physical Exam    Neurological Exam:   LOC: alert  Attention Span: Good   Language: No aphasia  Articulation: No dysarthria  Orientation: Person, Place, Time   Visual Fields: Full  EOM (CN III, IV, VI): Full/intact  Pupils (CN II, III): PERRL  Facial Sensation (CN V): Normal  Facial Movement (CN VII): Symmetric facial expression    Motor: Arm left  Normal 5/5  Leg left  Normal 5/5  Arm right  Normal 5/5  Leg right Normal 5/5  Cebellar: No evidence of appendicular or axial ataxia  Sensation: Intact to light touch, temperature and vibration      Laboratory:  CMP: No results for input(s): GLUCOSE, CALCIUM, ALBUMIN, PROT, NA, K, CO2, CL, BUN, CREATININE, ALKPHOS, ALT, AST, BILITOT in the last 24 hours.  CBC: No results for input(s): WBC, RBC, HGB, HCT, PLT, MCV, MCH, MCHC in the last 168 hours.  Lipid Panel: No results for input(s): CHOL, LDLCALC, HDL, TRIG in the last 168 hours.  Coagulation: No results for input(s): PT, INR, APTT in the last 168 hours.  Hgb A1C: No results for input(s): HGBA1C in the last 168 hours.  TSH: No results for input(s): TSH in the last 168 hours.    Diagnostic Results:      Brain imaging:  CT 09/18/2019 1. No acute intracranial abnormalities  noting sequela of chronic microvascular ischemic change and senescent change.  2. Bilateral mastoid effusions.    MRI Brain pending    Vessel Imaging:  CTA Head and Neck pending    Cardiac Evaluation:   TTE pending

## 2019-09-19 NOTE — ASSESSMENT & PLAN NOTE
-Stroke risk factor.  Patient states she has been taking 12.5 mg of Metoprolol 2/2 feeling her BP was too low.  -Hold metoprolol for now until no stroke confirmed on imaging

## 2019-09-19 NOTE — PROGRESS NOTES
[facility]  Vascular Neurology  Comprehensive Stroke Center  Progress Note    Assessment/Plan:     * Embolic stroke involving right middle cerebral artery  Gale Fernández is a 74 y.o. female with a significant medical history of HTN, HLD, pre-DM, A-fib on Xarelto, anxiety who presents to the hospital complaining of left hand weakness that lasted ~30 min. MRI brain acute R MCA stroke. CTA no LVO or stenosis. Etiology likely cardio embolic in setting of known A fib.     Encouraged/educated pt on Mediterranean diet to decrease risk of stroke.    Antithrombotics for secondary stroke prevention: Anticoagulants: Rivaroxaban 20 mg daily    Statins for secondary stroke prevention and hyperlipidemia, if present:   Statins: Atorvastatin- 40 mg daily    Aggressive risk factor modification: HTN, DM, HLD, Obesity, A-Fib     Rehab efforts: The patient has been evaluated by a stroke team provider and the therapy needs have been fully considered based off the presenting complaints and exam findings. The following therapy evaluations are needed: None    Diagnostics ordered/pending: None     VTE prophylaxis: None: Reason for No Pharmacological VTE Prophylaxis: Currently on anticoagulation, Mechanical prophylaxis: Place SCDs    BP parameters: Infarct: No intervention, SBP <220        Prediabetes  -Stroke risk factor.  A1c 5.2  -Monitor    UTI (urinary tract infection)  UA increased RBC 24, WBC 4. Reflex to urine culture pending   Bactrim x 7 days ordered at discharge.  F/u urine culture and treatment regiment when result    Borderline hyperlipidemia  -Stroke risk factor.    -Atorvastatin 40 mg daily    Atrial fibrillation  -Stroke risk factor.  Patient is currently on Xarelto.  Endorses missing one dose 9/18.  -Continue Xarelto    History of hypertension  -Stroke risk factor.    - Patient states she has been taking 12.5 mg of Metoprolol 2/2 feeling her BP was too low.   - hypertensive while inpatient, resume  home metoprolol, check BP daily at home, f/u with PCP       9/18 - L hand weakness, voice change for 30 min, hypertensive. Admitted in observation for TIA, stroke workup. MRI brain acute R MCA infarct.   9/19 - Continue xarelto. TTE complete. NIH 0, L hand weakness resolved. UA with increased RBC, reflexed urine culture pending, bactrim started, team will f/u urine culture. Discharge home with no needs. F/u in stroke clinic in 4-6 weeks.     STROKE DOCUMENTATION        NIH Scale:  1a. Level of Consciousness: 0-->Alert, keenly responsive  1b. LOC Questions: 0-->Answers both questions correctly  1c. LOC Commands: 0-->Performs both tasks correctly  2. Best Gaze: 0-->Normal  3. Visual: 0-->No visual loss  4. Facial Palsy: 0-->Normal symmetrical movements  5a. Motor Arm, Left: 0-->No drift, limb holds 90 (or 45) degrees for full 10 secs  5b. Motor Arm, Right: 0-->No drift, limb holds 90 (or 45) degrees for full 10 secs  6a. Motor Leg, Left: 0-->No drift, leg holds 30 degree position for full 5 secs  6b. Motor Leg, Right: 0-->No drift, leg holds 30 degree position for full 5 secs  7. Limb Ataxia: 0-->Absent  8. Sensory: 0-->Normal, no sensory loss  9. Best Language: 0-->No aphasia, normal  10. Dysarthria: 0-->Normal  11. Extinction and Inattention (formerly Neglect): 0-->No abnormality  Total (NIH Stroke Scale): 0       Modified Tigist Score: 0  [place] Coma Scale:    ABCD2 Score:    [id number] Score:   HAS -BLED Score:3  ICH Score:   Hunt & Rodríguez Classification:      Hemorrhagic change of an Ischemic Stroke: Does this patient have an ischemic stroke with hemorrhagic changes? No     Neurologic Chief Complaint: L hand weakness and voice change for 30 min    Subjective:     Interval History: Patient is seen for follow-up neurological assessment and treatment recommendations: Continue xarelto. TTE complete. NIH 0, L hand weakness resolved. UA with increased RBC, reflexed urine culture pending, bactrim started, team will f/u  urine culture. Discharge home with no needs. F/u in stroke clinic in 4-6 weeks.     HPI, Past Medical, Family, and Social History remains the same as documented in the initial encounter.     Review of Systems   Constitutional: Negative for chills and fever.   HENT: Negative for trouble swallowing and voice change.    Eyes: Negative for photophobia and visual disturbance.   Respiratory: Negative for shortness of breath.    Cardiovascular: Negative for chest pain.   Gastrointestinal: Negative for nausea and vomiting.   Neurological: Negative for facial asymmetry, speech difficulty, weakness, numbness and headaches.   Psychiatric/Behavioral: Negative for confusion and decreased concentration.     Scheduled Meds:   atorvastatin  40 mg Oral Daily    rivaroxaban  20 mg Oral Daily with dinner     Continuous Infusions:   sodium chloride 0.9%       PRN Meds:acetaminophen, labetalol, ondansetron, ondansetron, sodium chloride 0.9%, sodium chloride 0.9%    Objective:     Vital Signs (Most Recent):  Temp: 98.3 °F (36.8 °C) (09/19/19 0805)  Pulse: 85 (09/19/19 1115)  Resp: 17 (09/19/19 0805)  BP: 139/70 (09/19/19 0805)  SpO2: 97 % (09/19/19 0805)  BP Location: Left arm    Vital Signs Range (Last 24H):  Temp:  [97.5 °F (36.4 °C)-98.4 °F (36.9 °C)]   Pulse:  [66-96]   Resp:  [16-18]   BP: (134-180)/()   SpO2:  [94 %-99 %]   BP Location: Left arm    Physical Exam   Constitutional: She is oriented to person, place, and time. She appears well-developed.   HENT:   Head: Normocephalic.   Eyes: Pupils are equal, round, and reactive to light. EOM are normal.   Neck: Normal range of motion.   Cardiovascular: Normal rate.   Pulmonary/Chest: Effort normal.   Abdominal: Soft.   Musculoskeletal: Normal range of motion.   Neurological: She is alert and oriented to person, place, and time.   Skin: Skin is warm and dry.   Psychiatric: She has a normal mood and affect.   Vitals reviewed.      Neurological Exam:   LOC: alert  Attention  Span: Good   Language: No aphasia  Articulation: No dysarthria  Orientation: Person, Place, Time   EOM (CN III, IV, VI): Full/intact  Facial Movement (CN VII): Symmetric facial expression    Motor: Arm left  Normal 5/5  Leg left  Normal 5/5  Arm right  Normal 5/5  Leg right Normal 5/5  Sensation: Intact to light touch, temperature and vibration  Tone: Normal tone throughout    Laboratory:  CMP:   Recent Labs   Lab 09/19/19  0407   CALCIUM 9.4   ALBUMIN 3.5   PROT 6.7      K 4.3   CO2 26      BUN 16   CREATININE 0.7   ALKPHOS 56   ALT 11   AST 17   BILITOT 0.6     CBC:   Recent Labs   Lab 09/19/19  0407   WBC 7.69   RBC 4.67   HGB 14.2   HCT 46.1      MCV 99*   MCH 30.4   MCHC 30.8*     Lipid Panel:   Recent Labs   Lab 09/18/19  2354   CHOL 206*   LDLCALC 138.0   HDL 55   TRIG 65     Hgb A1C:   Recent Labs   Lab 09/18/19  2354   HGBA1C 5.2     TSH:   Recent Labs   Lab 09/18/19  2354   TSH 0.917       Diagnostic Results     Brain Imaging   MRI brain 9/19/19    Small focus of increased diffusion signal in the lateral aspect of the right parietal lobe suggestive of recent infarct.    CT head 9/18/19  1. No acute intracranial abnormalities noting sequela of chronic microvascular ischemic change and senescent change.  2. Bilateral mastoid effusions.    Vessel Imaging   CTA head and neck 9/19/19  No acute intracranial pathology.  No high-grade stenosis or major vessel occlusion.    Cardiac Imaging   TTE 9/19/19  · Concentric left ventricular remodeling.  · Normal left ventricular systolic function. The estimated ejection fraction is 60%  · Normal right ventricular systolic function.  · No wall motion abnormalities.  · The estimated PA systolic pressure is 23 mm Hg  · Normal central venous pressure (3 mm Hg).  · Atrial fibrillation observed.  · The left atrium is normal.       Jarek Truong NP  [facility]  Department of Vascular Neurology   [facility]

## 2019-09-19 NOTE — PLAN OF CARE
09/19/19 1318   Post-Acute Status   Post-Acute Authorization Other   Other Status No Post-Acute Service Needs       No needs noted.  SW in contact with CM and Medical staff. Will continue to follow and offer support as needed.     Reinier Ward LMSW  [facility]   Ext. [phone]

## 2019-09-19 NOTE — PLAN OF CARE
Future Appointments   Date Time Provider Department Center   9/30/2019  1:20 PM Vickie Hurtado MD [facility]   Patient discharged prior to discharge planning assessment.      09/19/19 1250   Final Note   Assessment Type Final Discharge Note   Anticipated Discharge Disposition Home   Right Care Referral Info   Post Acute Recommendation No Care

## 2019-09-19 NOTE — ASSESSMENT & PLAN NOTE
Gale Fernández is a 74 y.o. female with a significant medical history of HTN, HLD, pre-DM, A-fib on Xarelto, anxiety who presents to the hospital complaining of left hand weakness that lasted ~30 min. MRI brain acute R MCA stroke. CTA no LVO or stenosis. Etiology likely cardio embolic in setting of known A fib.     Encouraged/educated pt on Mediterranean diet to decrease risk of stroke.    Antithrombotics for secondary stroke prevention: Anticoagulants: Rivaroxaban 20 mg daily    Statins for secondary stroke prevention and hyperlipidemia, if present:   Statins: Atorvastatin- 40 mg daily    Aggressive risk factor modification: HTN, DM, HLD, Obesity, A-Fib     Rehab efforts: The patient has been evaluated by a stroke team provider and the therapy needs have been fully considered based off the presenting complaints and exam findings. The following therapy evaluations are needed: None    Diagnostics ordered/pending: None     VTE prophylaxis: None: Reason for No Pharmacological VTE Prophylaxis: Currently on anticoagulation, Mechanical prophylaxis: Place SCDs    BP parameters: Infarct: No intervention, SBP <220

## 2019-09-19 NOTE — NURSING
Patient given discharge instructions. IV removed and bandage applied. Telemetry monitor removed and returned to the nursing station. Patient awaiting transportation out of facility.    9/19/2019 12:05 PM

## 2019-09-19 NOTE — ED NOTES
Pt ambulated to restroom with steady gait. Pt placed back in stretcher and reconnected to continuous cardiac monitoring, BP, and pulse oximetry. stretcher in low and locked position, side rails up x2, call light in reach. Family member x1 at bedside.

## 2019-09-19 NOTE — HPI
Gale Fernández is a 74 y.o. female with a significant medical history of HTN, HLD, pre-DM, A-fib on Xarelto, anxiety who presents to the hospital complaining of left hand weakness that lasted ~30 min.  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  At that time the patient noticed her voice had changed.  The school nurse measured the patient's BP and it was reportedly 182/137.  Currently her symptoms have resolved.  The patient denies having HA, CP, speech difficulty, change in vision, or N/V.  She currently endorses dizziness and states she has vertigo in the mornings with nausea that resolves spontaneously.   ED work up included POCT testing that revealed findings concerning for UTI and elevated BNP.  CTH negative for acute findings.  Will admit to Vascular Neurology for further evaluation.    MRI brain acute infarct in R MCA. CTA head and neck no LVO or stenosis. Echo evident of A fib, no LAE. Cardio embolic etiology. Xarelto, Lipitor, f/u in stroke clinic in 4-6 weeks. NIH 0 at discharge, L hand weakness resolved. Discharge home, no needs.

## 2019-09-19 NOTE — ASSESSMENT & PLAN NOTE
UA increased RBC 24, WBC 4. Reflex to urine culture pending   Bactrim x 7 days ordered at discharge.  F/u urine culture and treatment regiment when result

## 2019-09-19 NOTE — HOSPITAL COURSE
9/18 - L hand weakness, voice change for 30 min, hypertensive. Admitted in observation for TIA, stroke workup. MRI brain acute R MCA infarct.   9/19 - Continue xarelto. TTE complete. NIH 0, L hand weakness resolved. UA with increased RBC, reflexed urine culture pending, bactrim started, team will f/u urine culture. Discharge home with no needs. F/u in stroke clinic in 4-6 weeks.

## 2019-09-19 NOTE — SUBJECTIVE & OBJECTIVE
Neurologic Chief Complaint: L hand weakness and voice change for 30 min    Subjective:     Interval History: Patient is seen for follow-up neurological assessment and treatment recommendations: Continue xarelto. TTE complete. NIH 0, L hand weakness resolved. UA with increased RBC, reflexed urine culture pending, bactrim started, team will f/u urine culture. Discharge home with no needs. F/u in stroke clinic in 4-6 weeks.     HPI, Past Medical, Family, and Social History remains the same as documented in the initial encounter.     Review of Systems   Constitutional: Negative for chills and fever.   HENT: Negative for trouble swallowing and voice change.    Eyes: Negative for photophobia and visual disturbance.   Respiratory: Negative for shortness of breath.    Cardiovascular: Negative for chest pain.   Gastrointestinal: Negative for nausea and vomiting.   Neurological: Negative for facial asymmetry, speech difficulty, weakness, numbness and headaches.   Psychiatric/Behavioral: Negative for confusion and decreased concentration.     Scheduled Meds:   atorvastatin  40 mg Oral Daily    rivaroxaban  20 mg Oral Daily with dinner     Continuous Infusions:   sodium chloride 0.9%       PRN Meds:acetaminophen, labetalol, ondansetron, ondansetron, sodium chloride 0.9%, sodium chloride 0.9%    Objective:     Vital Signs (Most Recent):  Temp: 98.3 °F (36.8 °C) (09/19/19 0805)  Pulse: 85 (09/19/19 1115)  Resp: 17 (09/19/19 0805)  BP: 139/70 (09/19/19 0805)  SpO2: 97 % (09/19/19 0805)  BP Location: Left arm    Vital Signs Range (Last 24H):  Temp:  [97.5 °F (36.4 °C)-98.4 °F (36.9 °C)]   Pulse:  [66-96]   Resp:  [16-18]   BP: (134-180)/()   SpO2:  [94 %-99 %]   BP Location: Left arm    Physical Exam   Constitutional: She is oriented to person, place, and time. She appears well-developed.   HENT:   Head: Normocephalic.   Eyes: Pupils are equal, round, and reactive to light. EOM are normal.   Neck: Normal range of motion.    Cardiovascular: Normal rate.   Pulmonary/Chest: Effort normal.   Abdominal: Soft.   Musculoskeletal: Normal range of motion.   Neurological: She is alert and oriented to person, place, and time.   Skin: Skin is warm and dry.   Psychiatric: She has a normal mood and affect.   Vitals reviewed.      Neurological Exam:   LOC: alert  Attention Span: Good   Language: No aphasia  Articulation: No dysarthria  Orientation: Person, Place, Time   EOM (CN III, IV, VI): Full/intact  Facial Movement (CN VII): Symmetric facial expression    Motor: Arm left  Normal 5/5  Leg left  Normal 5/5  Arm right  Normal 5/5  Leg right Normal 5/5  Sensation: Intact to light touch, temperature and vibration  Tone: Normal tone throughout    Laboratory:  CMP:   Recent Labs   Lab 09/19/19  0407   CALCIUM 9.4   ALBUMIN 3.5   PROT 6.7      K 4.3   CO2 26      BUN 16   CREATININE 0.7   ALKPHOS 56   ALT 11   AST 17   BILITOT 0.6     CBC:   Recent Labs   Lab 09/19/19  0407   WBC 7.69   RBC 4.67   HGB 14.2   HCT 46.1      MCV 99*   MCH 30.4   MCHC 30.8*     Lipid Panel:   Recent Labs   Lab 09/18/19  2354   CHOL 206*   LDLCALC 138.0   HDL 55   TRIG 65     Hgb A1C:   Recent Labs   Lab 09/18/19  2354   HGBA1C 5.2     TSH:   Recent Labs   Lab 09/18/19  2354   TSH 0.917       Diagnostic Results     Brain Imaging   MRI brain 9/19/19    Small focus of increased diffusion signal in the lateral aspect of the right parietal lobe suggestive of recent infarct.    CT head 9/18/19  1. No acute intracranial abnormalities noting sequela of chronic microvascular ischemic change and senescent change.  2. Bilateral mastoid effusions.    Vessel Imaging   CTA head and neck 9/19/19  No acute intracranial pathology.  No high-grade stenosis or major vessel occlusion.    Cardiac Imaging   TTE 9/19/19  · Concentric left ventricular remodeling.  · Normal left ventricular systolic function. The estimated ejection fraction is 60%  · Normal right ventricular  systolic function.  · No wall motion abnormalities.  · The estimated PA systolic pressure is 23 mm Hg  · Normal central venous pressure (3 mm Hg).  · Atrial fibrillation observed.  · The left atrium is normal.

## 2019-09-19 NOTE — TELEPHONE ENCOUNTER
----- Message from Kati Dudley sent at 9/19/2019 12:55 PM CDT -----  Contact: MARIELOS ARTEAGA [[id number]]  Name of Who is Calling : MARIELOS ARTEAGA [[id number]]    What is the request in detail :     Patient is requesting a call from staff she would like to be seen later in the afternoon for her scheduled appointment she states after 3 pm would be a good time for her  .....Please contact to further discuss and advise.    Can the clinic reply by [facility] :  Phone call only    What Number to Call Back : [phone]

## 2019-09-19 NOTE — DISCHARGE SUMMARY
[facility]  Vascular Neurology  Comprehensive Stroke Center  Discharge Summary     Summary:     Admit Date: 9/18/2019 12:42 PM    Discharge Date and Time: 9/19/2019 12:18 PM    Attending Physician: Fani Gerard MD    Discharge Provider: Jarek Truong NP    History of Present Illness: Gale Fernández is a 74 y.o. female with a significant medical history of HTN, HLD, pre-DM, A-fib on Xarelto, anxiety who presents to the hospital complaining of left hand weakness that lasted ~30 min.  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  At that time the patient noticed her voice had changed.  The school nurse measured the patient's BP and it was reportedly 182/137.  Currently her symptoms have resolved.  The patient denies having HA, CP, speech difficulty, change in vision, or N/V.  She currently endorses dizziness and states she has vertigo in the mornings with nausea that resolves spontaneously.   ED work up included POCT testing that revealed findings concerning for UTI and elevated BNP.  CTH negative for acute findings.  Will admit to Vascular Neurology for further evaluation.    MRI brain acute infarct in R MCA. CTA head and neck no LVO or stenosis. Echo evident of A fib, no LAE. Cardio embolic etiology. Xarelto, Lipitor, f/u in stroke clinic in 4-6 weeks. NIH 0 at discharge, L hand weakness resolved. Discharge home, no needs.                 Hospital Course (synopsis of major diagnoses, care, treatment, and services provided during the course of the hospital stay): 9/18 - L hand weakness, voice change for 30 min, hypertensive. Admitted in observation for TIA, stroke workup. MRI brain acute R MCA infarct.   9/19 - Continue xarelto. TTE complete. NIH 0, L hand weakness resolved. UA with increased RBC, reflexed urine culture pending, bactrim started, team will f/u urine culture. Discharge home with no needs. F/u in stroke clinic in 4-6  weeks.     Stroke Etiology: Evident Atrial fibrillation Cardio-Aortic Embolism (CE)    STROKE DOCUMENTATION         NIH Scale:  1a. Level of Consciousness: 0-->Alert, keenly responsive  1b. LOC Questions: 0-->Answers both questions correctly  1c. LOC Commands: 0-->Performs both tasks correctly  2. Best Gaze: 0-->Normal  3. Visual: 0-->No visual loss  4. Facial Palsy: 0-->Normal symmetrical movements  5a. Motor Arm, Left: 0-->No drift, limb holds 90 (or 45) degrees for full 10 secs  5b. Motor Arm, Right: 0-->No drift, limb holds 90 (or 45) degrees for full 10 secs  6a. Motor Leg, Left: 0-->No drift, leg holds 30 degree position for full 5 secs  6b. Motor Leg, Right: 0-->No drift, leg holds 30 degree position for full 5 secs  7. Limb Ataxia: 0-->Absent  8. Sensory: 0-->Normal, no sensory loss  9. Best Language: 0-->No aphasia, normal  10. Dysarthria: 0-->Normal  11. Extinction and Inattention (formerly Neglect): 0-->No abnormality  Total (NIH Stroke Scale): 0        Modified Garfield Score: 0  [place] Coma Scale:    ABCD2 Score:    [id number] Score:   HAS -BLED Score:3  ICH Score:   Hunt & Rodríguez Classification:       Assessment/Plan:     Diagnostic Results:    Brain Imaging   MRI brain 9/19/19    Small focus of increased diffusion signal in the lateral aspect of the right parietal lobe suggestive of recent infarct.     CT head 9/18/19  1. No acute intracranial abnormalities noting sequela of chronic microvascular ischemic change and senescent change.  2. Bilateral mastoid effusions.     Vessel Imaging   CTA head and neck 9/19/19  No acute intracranial pathology.  No high-grade stenosis or major vessel occlusion.     Cardiac Imaging   TTE 9/19/19  · Concentric left ventricular remodeling.  · Normal left ventricular systolic function. The estimated ejection fraction is 60%  · Normal right ventricular systolic function.  · No wall motion abnormalities.  · The estimated PA systolic pressure is 23 mm Hg  · Normal central  venous pressure (3 mm Hg).  · Atrial fibrillation observed.  · The left atrium is normal.     Interventions: None    Complications: None    Disposition: Home or Self Care    Final Active Diagnoses:    Diagnosis Date Noted POA    PRINCIPAL PROBLEM:  Embolic stroke involving right middle cerebral artery [I63.411] 09/18/2019 Yes    TIA (transient ischemic attack) [G45.9] 09/19/2019 Yes    Prediabetes [R73.03] 06/12/2019 Yes    UTI (urinary tract infection) [N39.0] 08/17/2013 Yes    Borderline hyperlipidemia [E78.5]  Yes    Atrial fibrillation [I48.91] 06/20/2013 Yes    History of hypertension [Z86.79]  Not Applicable      Problems Resolved During this Admission:     * Embolic stroke involving right middle cerebral artery  Gale Fernández is a 74 y.o. female with a significant medical history of HTN, HLD, pre-DM, A-fib on Xarelto, anxiety who presents to the hospital complaining of left hand weakness that lasted ~30 min. MRI brain acute R MCA stroke. CTA no LVO or stenosis. Etiology likely cardio embolic in setting of known A fib.     Encouraged/educated pt on Mediterranean diet to decrease risk of stroke.    Antithrombotics for secondary stroke prevention: Anticoagulants: Rivaroxaban 20 mg daily    Statins for secondary stroke prevention and hyperlipidemia, if present:   Statins: Atorvastatin- 40 mg daily    Aggressive risk factor modification: HTN, DM, HLD, Obesity, A-Fib     Rehab efforts: The patient has been evaluated by a stroke team provider and the therapy needs have been fully considered based off the presenting complaints and exam findings. The following therapy evaluations are needed: None    Diagnostics ordered/pending: None     VTE prophylaxis: None: Reason for No Pharmacological VTE Prophylaxis: Currently on anticoagulation, Mechanical prophylaxis: Place SCDs    BP parameters: Infarct: No intervention, SBP <220        Prediabetes  -Stroke risk factor.  A1c 5.2  -Monitor    UTI (urinary tract  infection)  UA increased RBC 24, WBC 4. Reflex to urine culture pending   Bactrim x 7 days ordered at discharge.  F/u urine culture and treatment regiment when result    Borderline hyperlipidemia  -Stroke risk factor.    -Atorvastatin 40 mg daily    Atrial fibrillation  -Stroke risk factor.  Patient is currently on Xarelto.  Endorses missing one dose 9/18.  -Continue Xarelto    History of hypertension  -Stroke risk factor.    - Patient states she has been taking 12.5 mg of Metoprolol 2/2 feeling her BP was too low.   - hypertensive while inpatient, resume home metoprolol, check BP daily at home, f/u with PCP        Recommendations:     Post-discharge complication risks: Falls, Urinary tract infections    Stroke Education given to: patient and family    Follow-up in Stroke Clinic in 4-6 weeks.     Discharge Plan:  Statin: Atorvastatin 40mg  Anticoagulant: Rivaroxaban  Aggresive risk factor modification:  Hypertension  Diabetes  High Cholesterol  Diet  Exercise  Atrial Fibrillation      Follow Up:  Follow-up Information     Vickie Hurtado MD On 9/30/2019.    Specialties:  Internal Medicine, Pediatrics  Why:  Hospital admission/Follow up. Please see PCP within 7-10 days of discharge. 1:20 PM  Contact information:  [address] [place] [address]  [phone]             [facility] VASCULAR NEUROLOGY In 4 weeks.    Specialty:  Vascular Neurology  Why:  Someone will call you to schedule a stroke follow up in 4-6 weeks. If someone does not call you within 1 week please call number listed.   Contact information:  [address]  [facility] [address]  [phone]                 Patient Instructions:      Activity as tolerated       Medications:  Reconciled Home Medications:      Medication List      START taking these medications    atorvastatin 40 MG tablet  Commonly known as:  LIPITOR  Take 1 tablet (40 mg total) by mouth once daily.  Start taking on:  9/20/2019     sulfamethoxazole-trimethoprim  800-160mg 800-160 mg Tab  Commonly known as:  BACTRIM DS  Take 1 tablet by mouth 2 (two) times daily. for 7 days        CONTINUE taking these medications    ALPRAZolam 0.25 MG tablet  Commonly known as:  XANAX  TAKE 1 TABLET BY MOUTH AT BEDTIME AS NEEDED FOR ANXIETRY     dronedarone 400 mg Tab  Commonly known as:  MULTAQ  TAKE 1 TABLET (400 MG TOTAL) BY MOUTH 2 (TWO) TIMES DAILY WITH MEALS.     lidocaine HCL 4% 4 % (40 mg/mL) external solution  Commonly known as:  XYLOCAINE  Apply 4 mLs topically as needed.     lidocaine-prilocaine cream  Commonly known as:  EMLA  Apply topically as needed.     metoprolol succinate 25 MG 24 hr tablet  Commonly known as:  TOPROL-XL  Take 0.5 tablets (12.5 mg total) by mouth once daily. TAKE 1 TABLET (25 MG TOTAL) BY MOUTH ONCE DAILY.     rivaroxaban 20 mg Tab  Commonly known as:  XARELTO  Take 1 tablet (20 mg total) by mouth every evening.     TAZORAC 0.1 % cream  Generic drug:  tazarotene  APPLY TO AFFECTED AREA EVERY DAY AT BEDTIME            Jarek Truong NP  [facility]  Department of Vascular Neurology   [facility]

## 2019-09-20 LAB — BACTERIA UR CULT: NORMAL

## 2019-09-24 ENCOUNTER — PATIENT MESSAGE (OUTPATIENT)
Dept: ELECTROPHYSIOLOGY | Facility: CLINIC | Age: 74
End: 2019-09-24

## 2019-09-24 NOTE — TELEPHONE ENCOUNTER
Called & scheduled Mrs. Fernández per Mrs. Cm's message below.  Mrs. Fernández requested apt on 10/10/19- 8:00AM EKG & 8:30AM [facility].

## 2019-09-30 ENCOUNTER — OFFICE VISIT (OUTPATIENT)
Dept: FAMILY MEDICINE | Facility: CLINIC | Age: 74
End: 2019-09-30
Payer: COMMERCIAL

## 2019-09-30 VITALS
SYSTOLIC BLOOD PRESSURE: 128 MMHG | WEIGHT: 180.25 LBS | OXYGEN SATURATION: 97 % | TEMPERATURE: 98 F | HEART RATE: 54 BPM | BODY MASS INDEX: 28.97 KG/M2 | DIASTOLIC BLOOD PRESSURE: 68 MMHG | HEIGHT: 66 IN

## 2019-09-30 DIAGNOSIS — Z23 FLU VACCINE NEED: ICD-10-CM

## 2019-09-30 DIAGNOSIS — Z71.89 ADVANCED DIRECTIVES, COUNSELING/DISCUSSION: ICD-10-CM

## 2019-09-30 DIAGNOSIS — I48.0 PAROXYSMAL ATRIAL FIBRILLATION: ICD-10-CM

## 2019-09-30 DIAGNOSIS — R73.03 PREDIABETES: ICD-10-CM

## 2019-09-30 DIAGNOSIS — E78.5 BORDERLINE HYPERLIPIDEMIA: ICD-10-CM

## 2019-09-30 DIAGNOSIS — G45.9 TIA (TRANSIENT ISCHEMIC ATTACK): Primary | ICD-10-CM

## 2019-09-30 PROBLEM — Z86.73 HISTORY OF STROKE: Status: ACTIVE | Noted: 2019-09-18

## 2019-09-30 PROCEDURE — 90471 FLU VACCINE - HIGH DOSE (65+) PRESERVATIVE FREE IM: ICD-10-PCS | Mod: S$GLB,,, | Performed by: INTERNAL MEDICINE

## 2019-09-30 PROCEDURE — 99215 OFFICE O/P EST HI 40 MIN: CPT | Mod: 25,S$GLB,, | Performed by: INTERNAL MEDICINE

## 2019-09-30 PROCEDURE — 1101F PT FALLS ASSESS-DOCD LE1/YR: CPT | Mod: CPTII,S$GLB,, | Performed by: INTERNAL MEDICINE

## 2019-09-30 PROCEDURE — 1101F PR PT FALLS ASSESS DOC 0-1 FALLS W/OUT INJ PAST YR: ICD-10-PCS | Mod: CPTII,S$GLB,, | Performed by: INTERNAL MEDICINE

## 2019-09-30 PROCEDURE — 99215 PR OFFICE/OUTPT VISIT, EST, LEVL V, 40-54 MIN: ICD-10-PCS | Mod: 25,S$GLB,, | Performed by: INTERNAL MEDICINE

## 2019-09-30 PROCEDURE — 90471 IMMUNIZATION ADMIN: CPT | Mod: S$GLB,,, | Performed by: INTERNAL MEDICINE

## 2019-09-30 PROCEDURE — 90662 IIV NO PRSV INCREASED AG IM: CPT | Mod: S$GLB,,, | Performed by: INTERNAL MEDICINE

## 2019-09-30 PROCEDURE — 99999 PR PBB SHADOW E&M-EST. PATIENT-LVL III: CPT | Mod: PBBFAC,,, | Performed by: INTERNAL MEDICINE

## 2019-09-30 PROCEDURE — 99999 PR PBB SHADOW E&M-EST. PATIENT-LVL III: ICD-10-PCS | Mod: PBBFAC,,, | Performed by: INTERNAL MEDICINE

## 2019-09-30 PROCEDURE — 90662 FLU VACCINE - HIGH DOSE (65+) PRESERVATIVE FREE IM: ICD-10-PCS | Mod: S$GLB,,, | Performed by: INTERNAL MEDICINE

## 2019-09-30 NOTE — LETTER
September 30, 2019      [facility]  [address]  [place] [address]  Phone: [phone]  Fax: [phone]       Patient: Gale Fernández   YOB: 1945  Date of Visit: 09/30/2019    To Whom It May Concern:    Dina Fernández  was at [facility] on 09/30/2019. Please excuse her absences on 09/18/2019 - 09/20/2019 and today. She may return to work on 10/01/2019 with no restrictions. If you have any questions or concerns, or if I can be of further assistance, please do not hesitate to contact me.    Sincerely,      Serenity Dennis LPN

## 2019-09-30 NOTE — PROGRESS NOTES
Transitional Care Note    Family and/or Caretaker present at visit?  No.  Diagnostic tests reviewed/disposition: No diagnosic tests pending after this hospitalization.  Disease/illness education: yes  Home health/community services discussion/referrals: Patient does not have home health established from hospital visit.  They do not need home health.  If needed, we will set up home health for the patient.   Establishment or re-establishment of referral orders for community resources: No other necessary community resources.   Discussion with other health care providers: No discussion with other health care providers necessary       HISTORY OF PRESENT ILLNESS:  Gale Fernández is a 74 y.o. female who presents to the clinic today for Hospital Follow Up  .   The patient presents to clinic today for follow-up of recent hospitalization for TIA.  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  She states that she suddenly started slurring her speech.  She saw pink cards which were actually green and she dropped all of the cards with the kids names on the floor.  She states that her symptoms did not last long.  Maybe just a few seconds.  She states in retrospect she had been having symptoms of vertigo and visual disturbances in the week prior.  She states usually her blood pressures are well controlled, but she was only taking half of her metoprolol.  She states that they have placed her on Lipitor 40 mg daily since she was hospitalized.  She states she feels fine.  She has follow-up with Neurology in the near future.  She still has some numbness of her tongue.  She is also concerned about a black discoloration of her tongue.  She states she was doing the keto diet for about 2 months prior to this event.  She states she only lost about 5 lb.      PAST MEDICAL HISTORY:  Past Medical History:   Diagnosis Date    Atrial fibrillation     Followed by EP cardiology    Borderline hyperlipidemia     Cataract     History of  abnormal Pap smear     More than 20 years ago; status post colposcopy    History of anxiety disorder     History of hypertension     Currently doing okay off medication    History of ventricular tachycardia     (PSVT) Status post ablation    Hypertension     Osteopenia     Refuses medication    Overweight(278.02)        PAST SURGICAL HISTORY:  Past Surgical History:   Procedure Laterality Date     SECTION, LOW TRANSVERSE      X1    left knee meniscus surgery  2012    neck tuck and liposuction      PSVT ablation      Skin cancer removal on the left eye         SOCIAL HISTORY:  Social History     Socioeconomic History    Marital status:      Spouse name: Not on file    Number of children: 1    Years of education: Not on file    Highest education level: Not on file   Occupational History    Occupation: Teacher   Social Needs    Financial resource strain: Not on file    Food insecurity:     Worry: Not on file     Inability: Not on file    Transportation needs:     Medical: Not on file     Non-medical: Not on file   Tobacco Use    Smoking status: Former Smoker     Types: Cigarettes    Smokeless tobacco: Never Used   Substance and Sexual Activity    Alcohol use: Yes     Comment: Rarely    Drug use: No    Sexual activity: Not on file   Lifestyle    Physical activity:     Days per week: Not on file     Minutes per session: Not on file    Stress: Not on file   Relationships    Social connections:     Talks on phone: Not on file     Gets together: Not on file     Attends Advent service: Not on file     Active member of club or organization: Not on file     Attends meetings of clubs or organizations: Not on file     Relationship status: Not on file   Other Topics Concern    Not on file   Social History Narrative    Not on file       FAMILY HISTORY:  Family History   Problem Relation Age of Onset    Heart disease Mother     Cancer Father         nasopharynx    Breast cancer  Cousin     Hypertension Sister     Diabetes Sister     Cancer Sister     Coronary artery disease Brother         s/p stenting    Hypertension Brother     Rheum arthritis Sister     Hypertension Brother     Hypertension Brother     Heart disease Brother         s/p CABG    Hypertension Brother     Coronary artery disease Brother         s/p stenting    Cancer Brother     No Known Problems Maternal Grandmother     No Known Problems Maternal Grandfather     No Known Problems Paternal Grandmother     No Known Problems Paternal Grandfather     No Known Problems Maternal Aunt     No Known Problems Maternal Uncle     No Known Problems Paternal Aunt     No Known Problems Paternal Uncle     Colon cancer Neg Hx     Amblyopia Neg Hx     Blindness Neg Hx     Cataracts Neg Hx     Glaucoma Neg Hx     Macular degeneration Neg Hx     Retinal detachment Neg Hx     Strabismus Neg Hx     Stroke Neg Hx     Thyroid disease Neg Hx        ALLERGIES AND MEDICATIONS: updated and reviewed.  Review of patient's allergies indicates:   Allergen Reactions    Flecainide Other (See Comments)     Other reaction(s): worsening arrythmia     Medication List with Changes/Refills   Current Medications    ALPRAZOLAM (XANAX) 0.25 MG TABLET    TAKE 1 TABLET BY MOUTH AT BEDTIME AS NEEDED FOR ANXIETRY    ATORVASTATIN (LIPITOR) 40 MG TABLET    Take 1 tablet (40 mg total) by mouth once daily.    DRONEDARONE (MULTAQ) 400 MG TAB    TAKE 1 TABLET (400 MG TOTAL) BY MOUTH 2 (TWO) TIMES DAILY WITH MEALS.    LIDOCAINE HCL 4% (XYLOCAINE) 4 % (40 MG/ML) EXTERNAL SOLUTION    Apply 4 mLs topically as needed.    LIDOCAINE-PRILOCAINE (EMLA) CREAM    Apply topically as needed.    METOPROLOL SUCCINATE (TOPROL-XL) 25 MG 24 HR TABLET    Take 0.5 tablets (12.5 mg total) by mouth once daily. TAKE 1 TABLET (25 MG TOTAL) BY MOUTH ONCE DAILY.    RIVAROXABAN (XARELTO) 20 MG TAB    Take 1 tablet (20 mg total) by mouth every evening.    TAZORAC 0.1 %  "CREAM    APPLY TO AFFECTED AREA EVERY DAY AT BEDTIME          CARE TEAM:  Patient Care Team:  Vickie Hurtado MD as PCP - General (Internal Medicine)  Helga Mcmullen LPN as Licensed Practical Nurse  Jose Eduardo Stanley MD as Consulting Physician (Electrophysiology)         REVIEW OF SYSTEMS:  Review of Systems   Constitutional: Negative for chills, fatigue, fever and unexpected weight change.   HENT: Negative for congestion and postnasal drip.    Eyes: Negative for pain and visual disturbance.   Respiratory: Negative for cough, shortness of breath and wheezing.    Cardiovascular: Negative for chest pain, palpitations and leg swelling.   Gastrointestinal: Negative for abdominal pain, constipation, diarrhea, nausea and vomiting.   Genitourinary: Negative for dysuria.   Musculoskeletal: Negative for arthralgias and back pain.   Skin: Negative for rash.   Neurological: Positive for numbness (- see HPI). Negative for weakness and headaches.   Psychiatric/Behavioral: Negative for dysphoric mood and sleep disturbance. The patient is not nervous/anxious.          PHYSICAL EXAM:   Vitals:    09/30/19 1328   BP: 128/68   Pulse: (!) 54   Temp: 98 °F (36.7 °C)     Weight: 81.8 kg (180 lb 3.6 oz)   Height: 5' 6" (167.6 cm)   Body mass index is 29.09 kg/m².      General appearance - alert, well appearing, and in no distress, overweight  Mental status - alert, oriented to person, place, and time, normal mood, behavior, speech, dress, motor activity, and thought processes  Eyes - pupils equal and reactive, extraocular eye movements intact, sclera anicteric  Mouth - mucous membranes moist, pharynx normal without lesions and mild black discoloration of the tongue  Neck - supple, no significant adenopathy, carotids upstroke normal bilaterally, no bruits  Lymphatics - no palpable cervical lymphadenopathy  Chest - clear to auscultation, no wheezes, rales or rhonchi, symmetric air entry  Heart - normal rate and regular rhythm, no " gallops noted  Neurological - alert, normal speech, no focal findings or movement disorder noted, cranial nerves II through XII intact  Musculoskeletal - patient noted to have Mild-Moderate osteoarthritic changes to both knee joints. No joint effusions noted., no muscular tenderness noted  Extremities - peripheral pulses normal, no pedal edema, no clubbing or cyanosis  Skin - normal coloration and turgor, no rashes, no suspicious skin lesions noted      Labs:  Pre-visit Labs - not done      ASSESSMENT AND PLAN:  1. TIA (transient ischemic attack)  Stable/asymptomatic.  We discussed risk factor reduction. She has follow up with neurology in the near future.    2. Paroxysmal atrial fibrillation  Currently in NSR. Continue Xarelto. Followed by cardiology.    3. Borderline hyperlipidemia  We discussed low fat diet and regular exercise.The current medical regimen is effective;  continue present plan and medications.      4. Prediabetes  Stable. We discussed low sugar/low carbohydrate diet and regular exercise to prevent progression. No need for prescription medication at this time.     5. Flu vaccine need    - Influenza - High Dose (65+) (PF) (IM)    6. Advanced directives, counseling/discussion  Advance Care Planning   I initiated the process and explained the importance of advance care planning today with the patient.  Advanced directives were discussed due to patient's age and/or chronic medical conditions. Prognosis based on current condition: good.   Paperwork was given to complete living will (at this point in time, the patient does have full decision-making capacity.  We discussed different extreme health states that she could experience, and reviewed what kind of medical care she would want in those situations) and medical POA (The patient received detailed information about the importance of designating a Health Care Power of . She was also instructed to communicate with this person about their wishes  for future healthcare, should she become sick and lose decision-making capacity).   [place] was not discussed.   Approximately 3 minute(s) were spent on counseling/discussion regarding end of life decision making.                   Follow up in about 3 months (around 12/30/2019), or if symptoms worsen or fail to improve, for follow up chronic medical conditions.. or sooner as needed..

## 2019-10-08 ENCOUNTER — PATIENT OUTREACH (OUTPATIENT)
Dept: ADMINISTRATIVE | Facility: OTHER | Age: 74
End: 2019-10-08

## 2019-10-10 ENCOUNTER — OFFICE VISIT (OUTPATIENT)
Dept: ELECTROPHYSIOLOGY | Facility: CLINIC | Age: 74
End: 2019-10-10
Payer: COMMERCIAL

## 2019-10-10 VITALS
HEART RATE: 47 BPM | HEIGHT: 65 IN | DIASTOLIC BLOOD PRESSURE: 72 MMHG | BODY MASS INDEX: 29.65 KG/M2 | WEIGHT: 177.94 LBS | SYSTOLIC BLOOD PRESSURE: 126 MMHG

## 2019-10-10 DIAGNOSIS — G45.9 TIA (TRANSIENT ISCHEMIC ATTACK): ICD-10-CM

## 2019-10-10 DIAGNOSIS — I48.91 ATRIAL FIBRILLATION, UNSPECIFIED TYPE: Primary | ICD-10-CM

## 2019-10-10 DIAGNOSIS — Z79.899 VISIT FOR MONITORING MULTAQ THERAPY: ICD-10-CM

## 2019-10-10 DIAGNOSIS — Z86.73 HISTORY OF STROKE: ICD-10-CM

## 2019-10-10 DIAGNOSIS — Z79.01 CURRENT USE OF LONG TERM ANTICOAGULATION: ICD-10-CM

## 2019-10-10 DIAGNOSIS — Z86.73 HISTORY OF TIA (TRANSIENT ISCHEMIC ATTACK): ICD-10-CM

## 2019-10-10 DIAGNOSIS — Z51.81 VISIT FOR MONITORING MULTAQ THERAPY: ICD-10-CM

## 2019-10-10 DIAGNOSIS — I47.10 SVT (SUPRAVENTRICULAR TACHYCARDIA): ICD-10-CM

## 2019-10-10 PROCEDURE — 99999 PR PBB SHADOW E&M-EST. PATIENT-LVL III: ICD-10-PCS | Mod: PBBFAC,,, | Performed by: NURSE PRACTITIONER

## 2019-10-10 PROCEDURE — 93005 RHYTHM STRIP: ICD-10-PCS | Mod: S$GLB,,, | Performed by: INTERNAL MEDICINE

## 2019-10-10 PROCEDURE — 99999 PR PBB SHADOW E&M-EST. PATIENT-LVL III: CPT | Mod: PBBFAC,,, | Performed by: NURSE PRACTITIONER

## 2019-10-10 PROCEDURE — 93005 ELECTROCARDIOGRAM TRACING: CPT | Mod: S$GLB,,, | Performed by: INTERNAL MEDICINE

## 2019-10-10 PROCEDURE — 99214 PR OFFICE/OUTPT VISIT, EST, LEVL IV, 30-39 MIN: ICD-10-PCS | Mod: S$GLB,,, | Performed by: NURSE PRACTITIONER

## 2019-10-10 PROCEDURE — 93010 ELECTROCARDIOGRAM REPORT: CPT | Mod: S$GLB,,, | Performed by: INTERNAL MEDICINE

## 2019-10-10 PROCEDURE — 1101F PT FALLS ASSESS-DOCD LE1/YR: CPT | Mod: CPTII,S$GLB,, | Performed by: NURSE PRACTITIONER

## 2019-10-10 PROCEDURE — 1101F PR PT FALLS ASSESS DOC 0-1 FALLS W/OUT INJ PAST YR: ICD-10-PCS | Mod: CPTII,S$GLB,, | Performed by: NURSE PRACTITIONER

## 2019-10-10 PROCEDURE — 93010 RHYTHM STRIP: ICD-10-PCS | Mod: S$GLB,,, | Performed by: INTERNAL MEDICINE

## 2019-10-10 PROCEDURE — 99214 OFFICE O/P EST MOD 30 MIN: CPT | Mod: S$GLB,,, | Performed by: NURSE PRACTITIONER

## 2019-10-10 NOTE — PATIENT INSTRUCTIONS
Make sure you take xarelto with dinner nightly.   Take multaq with food.  Can take 1/2 tablet of metoprolol (12.5mg daily).

## 2019-10-28 ENCOUNTER — PATIENT MESSAGE (OUTPATIENT)
Dept: FAMILY MEDICINE | Facility: CLINIC | Age: 74
End: 2019-10-28

## 2019-10-28 DIAGNOSIS — K12.0 CANKER SORES ORAL: ICD-10-CM

## 2019-10-28 RX ORDER — CHLORHEXIDINE GLUCONATE ORAL RINSE 1.2 MG/ML
15 SOLUTION DENTAL 2 TIMES DAILY
Qty: 118 ML | Refills: 0 | Status: SHIPPED | OUTPATIENT
Start: 2019-10-28 | End: 2019-12-19

## 2019-11-02 ENCOUNTER — PATIENT OUTREACH (OUTPATIENT)
Dept: ADMINISTRATIVE | Facility: OTHER | Age: 74
End: 2019-11-02

## 2019-11-05 ENCOUNTER — OFFICE VISIT (OUTPATIENT)
Dept: NEUROLOGY | Facility: CLINIC | Age: 74
End: 2019-11-05
Payer: COMMERCIAL

## 2019-11-05 VITALS — WEIGHT: 177 LBS | BODY MASS INDEX: 29.49 KG/M2 | HEIGHT: 65 IN

## 2019-11-05 DIAGNOSIS — I63.411 CEREBROVASCULAR ACCIDENT (CVA) DUE TO EMBOLISM OF RIGHT MIDDLE CEREBRAL ARTERY: Primary | ICD-10-CM

## 2019-11-05 PROCEDURE — 99999 PR PBB SHADOW E&M-EST. PATIENT-LVL III: ICD-10-PCS | Mod: PBBFAC,,, | Performed by: PSYCHIATRY & NEUROLOGY

## 2019-11-05 PROCEDURE — 99214 PR OFFICE/OUTPT VISIT, EST, LEVL IV, 30-39 MIN: ICD-10-PCS | Mod: S$GLB,,, | Performed by: PSYCHIATRY & NEUROLOGY

## 2019-11-05 PROCEDURE — 1101F PT FALLS ASSESS-DOCD LE1/YR: CPT | Mod: CPTII,S$GLB,, | Performed by: PSYCHIATRY & NEUROLOGY

## 2019-11-05 PROCEDURE — 99999 PR PBB SHADOW E&M-EST. PATIENT-LVL III: CPT | Mod: PBBFAC,,, | Performed by: PSYCHIATRY & NEUROLOGY

## 2019-11-05 PROCEDURE — 99214 OFFICE O/P EST MOD 30 MIN: CPT | Mod: S$GLB,,, | Performed by: PSYCHIATRY & NEUROLOGY

## 2019-11-05 PROCEDURE — 1101F PR PT FALLS ASSESS DOC 0-1 FALLS W/OUT INJ PAST YR: ICD-10-PCS | Mod: CPTII,S$GLB,, | Performed by: PSYCHIATRY & NEUROLOGY

## 2019-11-05 NOTE — PROGRESS NOTES
"  Gale Fernández is a 74 y.o. year old female that  presents for stroke follow up    HPI:  Mrs Fernández has HTN, HLD, pre-DM, A-fib on Xarelto, anxiety, and history of R parietal infarct.  Stated that her ymptoms at presentation consisted of left hand weakness that lasted ~30 min and trouble speaking for few seconds.  Stroke work up can be summarize as follows: MRI brain with small focus of increased diffusion signal in the lateral aspect of the right parietal lobe. CTA head and neck: no LVO or stenosis, no hemodynamically significant carotid disease. TTE: EF 60%, no wall motion abnormality, normal LA, a fib noted. Lipid panel: cholesterol 206, triglycerides 65, HDL 55, .   Mrs Fernández denies recurrence of stroke like symptoms but endorses " a daily constant burning sensation in my mouth and the tongue turning black since the stroke".  No HA, vertigo, double vision, focal weakness or numbness, unsteadiness, falls, slurred speech, language or vision impairment.  Taking her xarelto religiously.    Please, see attach cerebrovascular history for further details regarding patient admission to our stroke service.  Cerebrovascular History:  " presents to the hospital complaining of left hand weakness that lasted ~30 min.  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  At that time the patient noticed her voice had changed.  The school nurse measured the patient's BP and it was reportedly 182/137.  Currently her symptoms have resolved.  The patient denies having HA, CP, speech difficulty, change in vision, or N/V.  She currently endorses dizziness and states she has vertigo in the mornings with nausea that resolves spontaneously.   ED work up included POCT testing that revealed findings concerning for UTI and elevated BNP.  CTH negative for acute findings.  Will admit to Vascular Neurology for further evaluation.   MRI brain acute infarct in R MCA. CTA head " "and neck no LVO or stenosis. Echo evident of A fib, no LAE. Cardio embolic etiology. Xarelto, Lipitor, f/u in stroke clinic in 4-6 weeks. NIH 0 at discharge, L hand weakness resolved. Discharge home, no needs".  Hospital Course (synopsis of major diagnoses, care, treatment, and services provided during the course of the hospital stay): 9/18 - L hand weakness, voice change for 30 min, hypertensive. Admitted in observation for TIA, stroke workup. MRI brain acute R MCA infarct.   9/19 - Continue xarelto. TTE complete. NIH 0, L hand weakness resolved. UA with increased RBC, reflexed urine culture pending, bactrim started, team will f/u urine culture. Discharge home with no needs. F/u in stroke clinic in 4-6 weeks.   Stroke Etiology: Evident Atrial fibrillation Cardio-Aortic Embolism (CE)     STROKE DOCUMENTATION       NIH Scale:  1a. Level of Consciousness: 0-->Alert, keenly responsive  1b. LOC Questions: 0-->Answers both questions correctly  1c. LOC Commands: 0-->Performs both tasks correctly  2. Best Gaze: 0-->Normal  3. Visual: 0-->No visual loss  4. Facial Palsy: 0-->Normal symmetrical movements  5a. Motor Arm, Left: 0-->No drift, limb holds 90 (or 45) degrees for full 10 secs  5b. Motor Arm, Right: 0-->No drift, limb holds 90 (or 45) degrees for full 10 secs  6a. Motor Leg, Left: 0-->No drift, leg holds 30 degree position for full 5 secs  6b. Motor Leg, Right: 0-->No drift, leg holds 30 degree position for full 5 secs  7. Limb Ataxia: 0-->Absent  8. Sensory: 0-->Normal, no sensory loss  9. Best Language: 0-->No aphasia, normal  10. Dysarthria: 0-->Normal  11. Extinction and Inattention (formerly Neglect): 0-->No abnormality  Total (NIH Stroke Scale): 0  Modified Poland Score: 0  Stoneham Coma Scale:    ABCD2 Score:    [id number] Score:   HAS -BLED Score:3  ICH Score:   Hunt & Rodríguez Classification:       Diagnostic Results:     Brain Imaging   MRI brain 9/19/19    Small focus of increased diffusion signal in the " lateral aspect of the right parietal lobe suggestive of recent infarct.     CT head 9/18/19  1. No acute intracranial abnormalities noting sequela of chronic microvascular ischemic change and senescent change.  2. Bilateral mastoid effusions.     Vessel Imaging   CTA head and neck 9/19/19  No acute intracranial pathology.  No high-grade stenosis or major vessel occlusion.     Cardiac Imaging   TTE 9/19/19  · Concentric left ventricular remodeling.  · Normal left ventricular systolic function. The estimated ejection fraction is 60%  · Normal right ventricular systolic function.  · No wall motion abnormalities.  · The estimated PA systolic pressure is 23 mm Hg  · Normal central venous pressure (3 mm Hg).  · Atrial fibrillation observed.  · The left atrium is normal.      Interventions: None     Complications: None     Disposition: Home or Self Care           Final Active Diagnoses:     Diagnosis Date Noted POA    PRINCIPAL PROBLEM:  Embolic stroke involving right middle cerebral artery [I63.411] 09/18/2019 Yes    TIA (transient ischemic attack) [G45.9] 09/19/2019 Yes    Prediabetes [R73.03] 06/12/2019 Yes    UTI (urinary tract infection) [N39.0] 08/17/2013 Yes    Borderline hyperlipidemia [E78.5]   Yes    Atrial fibrillation [I48.91] 06/20/2013 Yes    History of hypertension [Z86.79]   Not Applicable       Problems Resolved During this Admission:      * Embolic stroke involving right middle cerebral artery  Gale Fernández is a 74 y.o. female with a significant medical history of HTN, HLD, pre-DM, A-fib on Xarelto, anxiety who presents to the hospital complaining of left hand weakness that lasted ~30 min. MRI brain acute R MCA stroke. CTA no LVO or stenosis. Etiology likely cardio embolic in setting of known A fib.      Encouraged/educated pt on Mediterranean diet to decrease risk of stroke.     Antithrombotics for secondary stroke prevention: Anticoagulants: Rivaroxaban 20 mg daily     Statins for secondary  stroke prevention and hyperlipidemia, if present:   Statins: Atorvastatin- 40 mg daily     Aggressive risk factor modification: HTN, DM, HLD, Obesity, A-Fib     Rehab efforts: The patient has been evaluated by a stroke team provider and the therapy needs have been fully considered based off the presenting complaints and exam findings. The following therapy evaluations are needed: None     Diagnostics ordered/pending: None      VTE prophylaxis: None: Reason for No Pharmacological VTE Prophylaxis: Currently on anticoagulation, Mechanical prophylaxis: Place SCDs     BP parameters: Infarct: No intervention, SBP <220           Prediabetes  -Stroke risk factor.  A1c 5.2  -Monitor     UTI (urinary tract infection)  UA increased RBC 24, WBC 4. Reflex to urine culture pending   Bactrim x 7 days ordered at discharge.  F/u urine culture and treatment regiment when result     Borderline hyperlipidemia  -Stroke risk factor.    -Atorvastatin 40 mg daily     Atrial fibrillation  -Stroke risk factor.  Patient is currently on Xarelto.  Endorses missing one dose 9/18.  -Continue Xarelto     History of hypertension  -Stroke risk factor.    - Patient states she has been taking 12.5 mg of Metoprolol 2/2 feeling her BP was too low.   - hypertensive while inpatient, resume home metoprolol, check BP daily at home, f/u with PCP           Recommendations:      Post-discharge complication risks: Falls, Urinary tract infections     Stroke Education given to: patient and family     Follow-up in Stroke Clinic in 4-6 weeks.      Discharge Plan:  Statin: Atorvastatin 40mg  Anticoagulant: Rivaroxaban  Aggresive risk factor modification:  Hypertension  Diabetes  High Cholesterol  Diet  Exercise  Atrial Fibrillation    Past Medical History:   Diagnosis Date    Atrial fibrillation     Followed by EP cardiology    Borderline hyperlipidemia     Cataract     History of abnormal Pap smear     More than 20 years ago; status post colposcopy     History of anxiety disorder     History of hypertension     Currently doing okay off medication    History of ventricular tachycardia     (PSVT) Status post ablation    Hypertension     Osteopenia     Refuses medication    Overweight(278.02)      Social History     Socioeconomic History    Marital status:      Spouse name: Not on file    Number of children: 1    Years of education: Not on file    Highest education level: Not on file   Occupational History    Occupation: Teacher   Social Needs    Financial resource strain: Not on file    Food insecurity:     Worry: Not on file     Inability: Not on file    Transportation needs:     Medical: Not on file     Non-medical: Not on file   Tobacco Use    Smoking status: Former Smoker     Types: Cigarettes    Smokeless tobacco: Never Used   Substance and Sexual Activity    Alcohol use: Yes     Comment: Rarely    Drug use: No    Sexual activity: Not on file   Lifestyle    Physical activity:     Days per week: Not on file     Minutes per session: Not on file    Stress: Not on file   Relationships    Social connections:     Talks on phone: Not on file     Gets together: Not on file     Attends Jain service: Not on file     Active member of club or organization: Not on file     Attends meetings of clubs or organizations: Not on file     Relationship status: Not on file   Other Topics Concern    Not on file   Social History Narrative    Not on file     Past Surgical History:   Procedure Laterality Date     SECTION, LOW TRANSVERSE      X1    left knee meniscus surgery  2012    neck tuck and liposuction  2012    PSVT ablation      Skin cancer removal on the left eye       Family History   Problem Relation Age of Onset    Heart disease Mother     Cancer Father         nasopharynx    Breast cancer Cousin     Hypertension Sister     Diabetes Sister     Cancer Sister     Coronary artery disease Brother         s/p stenting     Hypertension Brother     Rheum arthritis Sister     Hypertension Brother     Hypertension Brother     Heart disease Brother         s/p CABG    Hypertension Brother     Coronary artery disease Brother         s/p stenting    Cancer Brother     No Known Problems Maternal Grandmother     No Known Problems Maternal Grandfather     No Known Problems Paternal Grandmother     No Known Problems Paternal Grandfather     No Known Problems Maternal Aunt     No Known Problems Maternal Uncle     No Known Problems Paternal Aunt     No Known Problems Paternal Uncle     Colon cancer Neg Hx     Amblyopia Neg Hx     Blindness Neg Hx     Cataracts Neg Hx     Glaucoma Neg Hx     Macular degeneration Neg Hx     Retinal detachment Neg Hx     Strabismus Neg Hx     Stroke Neg Hx     Thyroid disease Neg Hx            Review of Systems  General ROS: negative for chills, fever or weight loss  Psychological ROS: negative for hallucination, depression or suicidal ideation  Ophthalmic ROS: negative for blurry vision, photophobia or eye pain  ENT ROS: negative for epistaxis, sore throat or rhinorrhea  Respiratory ROS: no cough, shortness of breath, or wheezing  Cardiovascular ROS: no chest pain or dyspnea on exertion  Gastrointestinal ROS: no abdominal pain, change in bowel habits, or black/ bloody stools  Genito-Urinary ROS: no dysuria, trouble voiding, or hematuria  Musculoskeletal ROS: negative for gait disturbance or muscular weakness  Neurological ROS: no syncope or seizures; no ataxia  Dermatological ROS: negative for pruritis, rash and jaundice    Physical Exam:  LMP 07/24/2012   General appearance: alert, cooperative, no distress  Constitutional:Oriented to person, place, and time.appears well-developed and well-nourished.   HEENT: Normocephalic, atraumatic, neck symmetrical, no nasal discharge   Eyes: conjunctivae/corneas clear, PERRL, EOM's intact  Lungs: clear to auscultation bilaterally, no dullness to  percussion bilaterally  Heart: regular rate and rhythm without rub; no displacement of the PMI   Abdomen: soft, non-tender; bowel sounds normoactive; no organomegaly  Extremities: extremities symmetric; no clubbing, cyanosis, or edema  Integument: Skin color, texture, turgor normal; no rashes; hair distrubution normal  Neurologic:   Mental status: alert and awake, oriented x 4, comprehension, naming, and repetition intact. No right to left confusion. Performs serial 7's without difficulty .No dysarthria.  CN 2-12: pupils 4 mm bilaterally, reactive to light. Fundi without papilledema. Visual fields full to confrontation. EOM full without nystagmus. Face sensation normal in all distributions. Face symmetric. Hearing grossly intact. Palate elevates well. Tongue midline without atrophy or fasciculations.  Motor: 5/5 all over  Sensory: intact in all modalities.  DTR's: 2+ all over.  Plantars: no tested.  Coordination: finger to nose and heel-knee-shin intact.  Gait: no ataxia or bradykinesia    LABS:    Complete Blood Count  Lab Results   Component Value Date    RBC 4.67 09/19/2019    HGB 14.2 09/19/2019    HCT 46.1 09/19/2019    MCV 99 (H) 09/19/2019    MCH 30.4 09/19/2019    MCHC 30.8 (L) 09/19/2019    RDW 12.4 09/19/2019     09/19/2019    MPV 9.9 09/19/2019    GRAN 4.8 09/19/2019    GRAN 62.4 09/19/2019    LYMPH 2.1 09/19/2019    LYMPH 27.6 09/19/2019    MONO 0.5 09/19/2019    MONO 6.9 09/19/2019    EOS 0.2 09/19/2019    BASO 0.04 09/19/2019    EOSINOPHIL 2.3 09/19/2019    BASOPHIL 0.5 09/19/2019    DIFFMETHOD Automated 09/19/2019       Comprehensive Metabolic Panel  Lab Results   Component Value Date    GLU 98 09/19/2019    BUN 16 09/19/2019    CREATININE 0.7 09/19/2019     09/19/2019    K 4.3 09/19/2019     09/19/2019    PROT 6.7 09/19/2019    ALBUMIN 3.5 09/19/2019    BILITOT 0.6 09/19/2019    AST 17 09/19/2019    ALKPHOS 56 09/19/2019    CO2 26 09/19/2019    ALT 11 09/19/2019    ANIONGAP 9  09/19/2019    EGFRNONAA >60.0 09/19/2019    ESTGFRAFRICA >60.0 09/19/2019       TSH  Lab Results   Component Value Date    TSH 0.917 09/18/2019         Assessment: 73 y/o with HTN, HLD, pre-DM, A-fib on Xarelto, anxiety, and history of R parietal infarct without residual deficits.  Stroke mechanism cardio embolism in the setting of known a fib.  Neurologically intact, NIHSS 0, Modify Tigist 0.  Burning mouth post stroke.  No diagnosis found.  There were no encounter diagnoses.      Plan:  1) Cardio embolic R parietal infarct: continue Xarelto for secondary stroke prevention  2) A fib, on Xarelto  3) HTN, follow by PCP  4) HLD, continue atorvastatin  5) Pre DM, as per PCP  6) Burning mouth post stroke: will watch for next 2 weeks and see if intervention needed.   No orders of the defined types were placed in this encounter.          Thien Lujan MD

## 2019-11-09 ENCOUNTER — PATIENT MESSAGE (OUTPATIENT)
Dept: FAMILY MEDICINE | Facility: CLINIC | Age: 74
End: 2019-11-09

## 2019-11-11 ENCOUNTER — PATIENT MESSAGE (OUTPATIENT)
Dept: FAMILY MEDICINE | Facility: CLINIC | Age: 74
End: 2019-11-11

## 2019-11-11 ENCOUNTER — PATIENT MESSAGE (OUTPATIENT)
Dept: NEUROLOGY | Facility: CLINIC | Age: 74
End: 2019-11-11

## 2019-11-11 RX ORDER — GABAPENTIN 100 MG/1
100 CAPSULE ORAL 3 TIMES DAILY
Qty: 90 CAPSULE | Refills: 0 | Status: SHIPPED | OUTPATIENT
Start: 2019-11-11 | End: 2019-11-15 | Stop reason: SDUPTHER

## 2019-11-16 ENCOUNTER — PATIENT MESSAGE (OUTPATIENT)
Dept: ELECTROPHYSIOLOGY | Facility: CLINIC | Age: 74
End: 2019-11-16

## 2019-11-18 DIAGNOSIS — I48.0 PAROXYSMAL ATRIAL FIBRILLATION: ICD-10-CM

## 2019-11-19 ENCOUNTER — TELEPHONE (OUTPATIENT)
Dept: NEUROLOGY | Facility: CLINIC | Age: 74
End: 2019-11-19

## 2019-11-20 NOTE — TELEPHONE ENCOUNTER
----- Message from Reina Guzman sent at 11/19/2019 11:31 AM CST -----  Contact: [facility]  [facility] is needing a call back to ok the pt's rx for gabapentin (NEURONTIN) 100 MG capsule    [facility] [place] [facility] [place] - [address]  [address]  [place] [address]  Phone: [phone] Fax: [phone]

## 2019-11-25 RX ORDER — GABAPENTIN 100 MG/1
CAPSULE ORAL
Qty: 90 CAPSULE | Refills: 0 | Status: SHIPPED | OUTPATIENT
Start: 2019-11-25 | End: 2020-03-12 | Stop reason: CLARIF

## 2019-12-03 ENCOUNTER — PATIENT MESSAGE (OUTPATIENT)
Dept: ELECTROPHYSIOLOGY | Facility: CLINIC | Age: 74
End: 2019-12-03

## 2019-12-03 DIAGNOSIS — I48.0 PAROXYSMAL ATRIAL FIBRILLATION: ICD-10-CM

## 2019-12-10 DIAGNOSIS — I48.0 PAROXYSMAL ATRIAL FIBRILLATION: ICD-10-CM

## 2019-12-19 DIAGNOSIS — K12.0 CANKER SORES ORAL: ICD-10-CM

## 2019-12-19 RX ORDER — CHLORHEXIDINE GLUCONATE ORAL RINSE 1.2 MG/ML
SOLUTION DENTAL
Qty: 473 ML | Refills: 11 | Status: SHIPPED | OUTPATIENT
Start: 2019-12-19 | End: 2020-03-12 | Stop reason: CLARIF

## 2019-12-20 NOTE — PROGRESS NOTES
"Ms. Fernández is a patient of Dr. Stanley and was last seen in clinic 10/10/2019.      Subjective:   Patient ID:  Gale Fernández is a 74 y.o. female who presents for follow-up of Atrial Fibrillation  .     HPI:    Ms. Fernández is a 74 y.o. female with pAF, AVNRT (s/p slow pathway modification 2004), osteopenia, HTN, TIA, and borderline HLD here for follow up.      Background:     Ms. 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 AT seen at the end of the procedure but it resolved once isoproterenol out of patient's system and AT was not ablated.  Ms. Fernández ultimately developed recurrent palpitations, and an Event Monitor at the time revealed SVT at 130 bpm.   Following this, she presented to clinic and was found to be in AF w/a controlled ventricular response. Ms. Fernández has a hx of noncompliance with her medical regimen; has changed medications and dosages on her own. She reportedly experienced fatigue and "low blood pressure" on flecainide.   At her office visit in September of 2015, Ms. Fernández agreed to resume dronedarone and Xarelto was initiated; she denied experiencing AF recurrence.  She did however, note back pain with radiation into her BLE and wondered whether one of her medications was contributing to her symptoms.  At her office visit 7/2016, Ms. Fernández reported feeling well overall with occasional baseline fatigue. She reported experiencing only 2 AF episodes during the preceding year; both of which lasted just several hours.   At her office visit 8/2017 she reported only occasional palpitations and some minor sensory complaints.  At office visit 8/2018 she reported palpitations and had stopped her Multaq. She agreed to restart this.   At clinic visit 6/26/2019, she was in AF. She said it was paroxysmal. Holter and echo ordered, but only echo completed. Was back in SR by follow up clinic appt.  9/19/2019 diagnosed with TIA. Symptoms included numb arm, aphasia, and double " vision. Symptoms have resolved, although her tongue felt somewhat numb afterward. Had not been taking xarelto with dinner. She declined switching OAC and elected instead to start taking xarelto with dinner.    Update (01/03/2020):    Today she says she has been feeling well. No cardiac complaints. Ms. Fernández denies chest pain with exertion or at rest, palpitations, SOB, [place], dizziness, or syncope.    She is currently taking xarelto 20mg for stroke prophylaxis and denies significant bleeding episodes. She is currently being treated with multaq 400mg BID for rhythm control and metoprolol succinate 12.5mg daily for HR control. Kidney function is stable, with a creatinine of 0.7 on 9/19/2019.    I have personally reviewed the patient's EKG today, which shows sinus bradycardia at 46bpm. CA interval is 174. QRS is 90. QT is 500.    Recent Cardiac Tests:    2D Echo (9/19/2019):  · Concentric left ventricular remodeling.  · Normal left ventricular systolic function. The estimated ejection fraction is 60%  · Normal right ventricular systolic function.  · No wall motion abnormalities.  · The estimated PA systolic pressure is 23 mm Hg  · Normal central venous pressure (3 mm Hg).  · Atrial fibrillation observed.    Current Outpatient Medications   Medication Sig    ALPRAZolam (XANAX) 0.25 MG tablet TAKE 1 TABLET BY MOUTH AT BEDTIME AS NEEDED FOR ANXIETRY    atorvastatin (LIPITOR) 40 MG tablet Take 1 tablet (40 mg total) by mouth once daily.    chlorhexidine (PERIDEX) 0.12 % solution Swish and spit 15 mls by mouth twice daily FOR 14 DAYS AS DIRECTED    dronedarone (MULTAQ) 400 mg Tab TAKE 1 TABLET (400 MG TOTAL) BY MOUTH 2 (TWO) TIMES DAILY WITH MEALS.    gabapentin (NEURONTIN) 100 MG capsule TAKE 1 CAPSULE BY MOUTH 3 TIMES A DAY    lidocaine HCL 4% (XYLOCAINE) 4 % (40 mg/mL) external solution Apply 4 mLs topically as needed.    lidocaine-prilocaine (EMLA) cream Apply topically as needed.    metoprolol succinate  "(TOPROL-XL) 25 MG 24 hr tablet Take 0.5 tablets (12.5 mg total) by mouth once daily. TAKE 1 TABLET (25 MG TOTAL) BY MOUTH ONCE DAILY. (Patient taking differently: Take 25 mg by mouth once daily. TAKE 1 TABLET (25 MG TOTAL) BY MOUTH ONCE DAILY.)    rivaroxaban (XARELTO) 20 mg Tab Take 1 tablet (20 mg total) by mouth every evening.    TAZORAC 0.1 % cream APPLY TO AFFECTED AREA EVERY DAY AT BEDTIME     No current facility-administered medications for this visit.        Review of Systems   Constitution: Negative for malaise/fatigue.   Cardiovascular: Negative for chest pain, dyspnea on exertion, irregular heartbeat, leg swelling and palpitations.   Respiratory: Negative for shortness of breath.    Hematologic/Lymphatic: Negative for bleeding problem.   Skin: Negative for rash.   Musculoskeletal: Negative for myalgias.   Gastrointestinal: Negative for hematemesis, hematochezia and nausea.   Genitourinary: Negative for hematuria.   Neurological: Negative for light-headedness.   Psychiatric/Behavioral: Negative for altered mental status.   Allergic/Immunologic: Negative for persistent infections.         Objective:          /62   Pulse (!) 48   Ht 5' 5" (1.651 m)   Wt 80 kg (176 lb 5.9 oz)   LMP 07/24/2012   BMI 29.35 kg/m²     Physical Exam   Constitutional: She is oriented to person, place, and time. She appears well-developed and well-nourished.   HENT:   Head: Normocephalic.   Nose: Nose normal.   Eyes: Pupils are equal, round, and reactive to light.   Cardiovascular: Regular rhythm, S1 normal and S2 normal. Bradycardia present.   No murmur heard.  Pulses:       Radial pulses are 2+ on the right side, and 2+ on the left side.   Pulmonary/Chest: Breath sounds normal. No respiratory distress.   Abdominal: Normal appearance.   Musculoskeletal: Normal range of motion. She exhibits no edema.   Neurological: She is alert and oriented to person, place, and time.   Skin: Skin is warm and dry. No erythema. "   Psychiatric: She has a normal mood and affect. Her speech is normal and behavior is normal.   Nursing note and vitals reviewed.    Lab Results   Component Value Date     09/19/2019    K 4.3 09/19/2019    MG 1.9 09/18/2019    BUN 16 09/19/2019    CREATININE 0.7 09/19/2019    ALT 11 09/19/2019    AST 17 09/19/2019    HGB 14.2 09/19/2019    HCT 46.1 09/19/2019    TSH 0.917 09/18/2019    LDLCALC 72.2 01/02/2020       Recent Labs   Lab 09/18/19  2354   INR 1.2       Assessment:     1. Atrial fibrillation, unspecified type    2. SVT (supraventricular tachycardia)    3. TIA (transient ischemic attack)    4. Current use of long term anticoagulation    5. History of hypertension    6. History of stroke    7. Visit for monitoring Multaq therapy      Plan:     In summary, Ms. Fernández is a 74 y.o. female with pAF, AVNRT (s/p slow pathway modification 2004), osteopenia, HTN, TIA and borderline HLD here for follow up.  She is doing well from a rhythm perspective, maintaining SR on multaq. No recent symptoms. TIA 9/2019 while on xarelto but not taking with food. Had declined switching OAC at the time. Now says she is compliant taking medication with dinner. No subsequent events. Slightly prolonged QT interval - this is stable for her but will recheck EKG in 3 mo. If she continues to be prolonged will need to consider switching AAD. She is bradycardic but asymptomatic and says her BPs are uncontrolled at home. Will stop metoprolol and start low dose amlodipine.     Stop metoprolol.  Start amlodipine 2.5mg daily.   Continue other medications.  RTC 3 months with EKG, sooner if needed.    *A copy of this note has been sent to Dr. Stanley*    Follow up in about 3 months (around 4/3/2020).    ------------------------------------------------------------------    HALEY Esptein, NP-C  Cardiac Electrophysiology

## 2019-12-26 ENCOUNTER — PATIENT MESSAGE (OUTPATIENT)
Dept: NEUROLOGY | Facility: CLINIC | Age: 74
End: 2019-12-26

## 2019-12-26 DIAGNOSIS — G45.1 TIA INVOLVING CAROTID ARTERY: Primary | ICD-10-CM

## 2019-12-29 ENCOUNTER — PATIENT OUTREACH (OUTPATIENT)
Dept: ADMINISTRATIVE | Facility: OTHER | Age: 74
End: 2019-12-29

## 2020-01-02 ENCOUNTER — LAB VISIT (OUTPATIENT)
Dept: LAB | Facility: HOSPITAL | Age: 75
End: 2020-01-02
Attending: PSYCHIATRY & NEUROLOGY
Payer: COMMERCIAL

## 2020-01-02 DIAGNOSIS — G45.1 TIA INVOLVING CAROTID ARTERY: ICD-10-CM

## 2020-01-02 LAB
CHOLEST SERPL-MCNC: 135 MG/DL (ref 120–199)
CHOLEST/HDLC SERPL: 2.8 {RATIO} (ref 2–5)
HDLC SERPL-MCNC: 48 MG/DL (ref 40–75)
HDLC SERPL: 35.6 % (ref 20–50)
LDLC SERPL CALC-MCNC: 72.2 MG/DL (ref 63–159)
NONHDLC SERPL-MCNC: 87 MG/DL
TRIGL SERPL-MCNC: 74 MG/DL (ref 30–150)

## 2020-01-02 PROCEDURE — 36415 COLL VENOUS BLD VENIPUNCTURE: CPT | Mod: PO

## 2020-01-02 PROCEDURE — 80061 LIPID PANEL: CPT

## 2020-01-03 ENCOUNTER — HOSPITAL ENCOUNTER (OUTPATIENT)
Dept: CARDIOLOGY | Facility: CLINIC | Age: 75
Discharge: HOME | End: 2020-01-03
Payer: COMMERCIAL

## 2020-01-03 ENCOUNTER — OFFICE VISIT (OUTPATIENT)
Dept: ELECTROPHYSIOLOGY | Facility: CLINIC | Age: 75
End: 2020-01-03
Payer: COMMERCIAL

## 2020-01-03 VITALS
BODY MASS INDEX: 29.38 KG/M2 | DIASTOLIC BLOOD PRESSURE: 62 MMHG | HEIGHT: 65 IN | WEIGHT: 176.38 LBS | HEART RATE: 48 BPM | SYSTOLIC BLOOD PRESSURE: 131 MMHG

## 2020-01-03 DIAGNOSIS — Z86.79 HISTORY OF HYPERTENSION: ICD-10-CM

## 2020-01-03 DIAGNOSIS — I48.91 ATRIAL FIBRILLATION, UNSPECIFIED TYPE: ICD-10-CM

## 2020-01-03 DIAGNOSIS — Z51.81 VISIT FOR MONITORING MULTAQ THERAPY: ICD-10-CM

## 2020-01-03 DIAGNOSIS — G45.9 TIA (TRANSIENT ISCHEMIC ATTACK): ICD-10-CM

## 2020-01-03 DIAGNOSIS — I48.91 ATRIAL FIBRILLATION, UNSPECIFIED TYPE: Primary | ICD-10-CM

## 2020-01-03 DIAGNOSIS — Z86.73 HISTORY OF STROKE: ICD-10-CM

## 2020-01-03 DIAGNOSIS — Z79.899 VISIT FOR MONITORING MULTAQ THERAPY: ICD-10-CM

## 2020-01-03 DIAGNOSIS — I47.10 SVT (SUPRAVENTRICULAR TACHYCARDIA): ICD-10-CM

## 2020-01-03 DIAGNOSIS — Z79.01 CURRENT USE OF LONG TERM ANTICOAGULATION: ICD-10-CM

## 2020-01-03 PROCEDURE — 99999 PR PBB SHADOW E&M-EST. PATIENT-LVL III: CPT | Mod: PBBFAC,,, | Performed by: NURSE PRACTITIONER

## 2020-01-03 PROCEDURE — 93005 RHYTHM STRIP: ICD-10-PCS | Mod: S$GLB,,, | Performed by: INTERNAL MEDICINE

## 2020-01-03 PROCEDURE — 1101F PR PT FALLS ASSESS DOC 0-1 FALLS W/OUT INJ PAST YR: ICD-10-PCS | Mod: CPTII,S$GLB,, | Performed by: NURSE PRACTITIONER

## 2020-01-03 PROCEDURE — 99214 OFFICE O/P EST MOD 30 MIN: CPT | Mod: S$GLB,,, | Performed by: NURSE PRACTITIONER

## 2020-01-03 PROCEDURE — 3078F PR MOST RECENT DIASTOLIC BLOOD PRESSURE < 80 MM HG: ICD-10-PCS | Mod: CPTII,S$GLB,, | Performed by: NURSE PRACTITIONER

## 2020-01-03 PROCEDURE — 99214 PR OFFICE/OUTPT VISIT, EST, LEVL IV, 30-39 MIN: ICD-10-PCS | Mod: S$GLB,,, | Performed by: NURSE PRACTITIONER

## 2020-01-03 PROCEDURE — 3078F DIAST BP <80 MM HG: CPT | Mod: CPTII,S$GLB,, | Performed by: NURSE PRACTITIONER

## 2020-01-03 PROCEDURE — 1101F PT FALLS ASSESS-DOCD LE1/YR: CPT | Mod: CPTII,S$GLB,, | Performed by: NURSE PRACTITIONER

## 2020-01-03 PROCEDURE — 99999 PR PBB SHADOW E&M-EST. PATIENT-LVL III: ICD-10-PCS | Mod: PBBFAC,,, | Performed by: NURSE PRACTITIONER

## 2020-01-03 PROCEDURE — 93005 ELECTROCARDIOGRAM TRACING: CPT | Mod: S$GLB,,, | Performed by: INTERNAL MEDICINE

## 2020-01-03 PROCEDURE — 93010 ELECTROCARDIOGRAM REPORT: CPT | Mod: S$GLB,,, | Performed by: INTERNAL MEDICINE

## 2020-01-03 PROCEDURE — 93010 RHYTHM STRIP: ICD-10-PCS | Mod: S$GLB,,, | Performed by: INTERNAL MEDICINE

## 2020-01-03 PROCEDURE — 1126F PR PAIN SEVERITY QUANTIFIED, NO PAIN PRESENT: ICD-10-PCS | Mod: S$GLB,,, | Performed by: NURSE PRACTITIONER

## 2020-01-03 PROCEDURE — 1126F AMNT PAIN NOTED NONE PRSNT: CPT | Mod: S$GLB,,, | Performed by: NURSE PRACTITIONER

## 2020-01-03 PROCEDURE — 1159F PR MEDICATION LIST DOCUMENTED IN MEDICAL RECORD: ICD-10-PCS | Mod: S$GLB,,, | Performed by: NURSE PRACTITIONER

## 2020-01-03 PROCEDURE — 1159F MED LIST DOCD IN RCRD: CPT | Mod: S$GLB,,, | Performed by: NURSE PRACTITIONER

## 2020-01-03 PROCEDURE — 3075F SYST BP GE 130 - 139MM HG: CPT | Mod: CPTII,S$GLB,, | Performed by: NURSE PRACTITIONER

## 2020-01-03 PROCEDURE — 3075F PR MOST RECENT SYSTOLIC BLOOD PRESS GE 130-139MM HG: ICD-10-PCS | Mod: CPTII,S$GLB,, | Performed by: NURSE PRACTITIONER

## 2020-01-03 RX ORDER — AMLODIPINE BESYLATE 2.5 MG/1
2.5 TABLET ORAL DAILY
Qty: 90 TABLET | Refills: 3 | Status: SHIPPED | OUTPATIENT
Start: 2020-01-03 | End: 2020-03-13

## 2020-01-03 NOTE — LETTER
January 3, 2020      Jose Eduardo Stanley MD  [address]  [facility] [address]           [facility] Arrhythm[facility]  [address]  [facility] [address]  Phone: [phone]  Fax: [phone]          Patient: Gale Fernández   MR Number: [id number]   YOB: 1945   Date of Visit: 1/3/2020       Dear Dr. Jose Eduardo Stanley:    Thank you for referring Gale Fernández to me for evaluation. Attached you will find relevant portions of my assessment and plan of care.    If you have questions, please do not hesitate to call me. I look forward to following Gale Fernández along with you.    Sincerely,    Vita Fox, NP    Enclosure  CC:  No Recipients    If you would like to receive this communication electronically, please contact [email or web address] or [phone] to request more information on [facility] Link access.    For providers and/or their staff who would like to refer a patient to [facility], please contact us through our one-stop-shop provider referral line, [facility] , at [phone].    If you feel you have received this communication in error or would no longer like to receive these types of communications, please e-mail [email or web address]

## 2020-01-26 ENCOUNTER — PATIENT OUTREACH (OUTPATIENT)
Dept: ADMINISTRATIVE | Facility: OTHER | Age: 75
End: 2020-01-26

## 2020-01-27 ENCOUNTER — OFFICE VISIT (OUTPATIENT)
Dept: NEUROLOGY | Facility: CLINIC | Age: 75
End: 2020-01-27
Payer: COMMERCIAL

## 2020-01-27 VITALS
DIASTOLIC BLOOD PRESSURE: 85 MMHG | SYSTOLIC BLOOD PRESSURE: 163 MMHG | HEART RATE: 79 BPM | WEIGHT: 176 LBS | BODY MASS INDEX: 29.32 KG/M2 | HEIGHT: 65 IN

## 2020-01-27 DIAGNOSIS — K14.6 BURNING MOUTH SYNDROME: Primary | ICD-10-CM

## 2020-01-27 PROCEDURE — 1159F MED LIST DOCD IN RCRD: CPT | Mod: S$GLB,,, | Performed by: PSYCHIATRY & NEUROLOGY

## 2020-01-27 PROCEDURE — 3077F SYST BP >= 140 MM HG: CPT | Mod: CPTII,S$GLB,, | Performed by: PSYCHIATRY & NEUROLOGY

## 2020-01-27 PROCEDURE — 1159F PR MEDICATION LIST DOCUMENTED IN MEDICAL RECORD: ICD-10-PCS | Mod: S$GLB,,, | Performed by: PSYCHIATRY & NEUROLOGY

## 2020-01-27 PROCEDURE — 99214 OFFICE O/P EST MOD 30 MIN: CPT | Mod: S$GLB,,, | Performed by: PSYCHIATRY & NEUROLOGY

## 2020-01-27 PROCEDURE — 1125F AMNT PAIN NOTED PAIN PRSNT: CPT | Mod: S$GLB,,, | Performed by: PSYCHIATRY & NEUROLOGY

## 2020-01-27 PROCEDURE — 99999 PR PBB SHADOW E&M-EST. PATIENT-LVL III: CPT | Mod: PBBFAC,,, | Performed by: PSYCHIATRY & NEUROLOGY

## 2020-01-27 PROCEDURE — 1125F PR PAIN SEVERITY QUANTIFIED, PAIN PRESENT: ICD-10-PCS | Mod: S$GLB,,, | Performed by: PSYCHIATRY & NEUROLOGY

## 2020-01-27 PROCEDURE — 3079F PR MOST RECENT DIASTOLIC BLOOD PRESSURE 80-89 MM HG: ICD-10-PCS | Mod: CPTII,S$GLB,, | Performed by: PSYCHIATRY & NEUROLOGY

## 2020-01-27 PROCEDURE — 1101F PT FALLS ASSESS-DOCD LE1/YR: CPT | Mod: CPTII,S$GLB,, | Performed by: PSYCHIATRY & NEUROLOGY

## 2020-01-27 PROCEDURE — 99214 PR OFFICE/OUTPT VISIT, EST, LEVL IV, 30-39 MIN: ICD-10-PCS | Mod: S$GLB,,, | Performed by: PSYCHIATRY & NEUROLOGY

## 2020-01-27 PROCEDURE — 99999 PR PBB SHADOW E&M-EST. PATIENT-LVL III: ICD-10-PCS | Mod: PBBFAC,,, | Performed by: PSYCHIATRY & NEUROLOGY

## 2020-01-27 PROCEDURE — 1101F PR PT FALLS ASSESS DOC 0-1 FALLS W/OUT INJ PAST YR: ICD-10-PCS | Mod: CPTII,S$GLB,, | Performed by: PSYCHIATRY & NEUROLOGY

## 2020-01-27 PROCEDURE — 3077F PR MOST RECENT SYSTOLIC BLOOD PRESSURE >= 140 MM HG: ICD-10-PCS | Mod: CPTII,S$GLB,, | Performed by: PSYCHIATRY & NEUROLOGY

## 2020-01-27 PROCEDURE — 3079F DIAST BP 80-89 MM HG: CPT | Mod: CPTII,S$GLB,, | Performed by: PSYCHIATRY & NEUROLOGY

## 2020-01-29 ENCOUNTER — PATIENT MESSAGE (OUTPATIENT)
Dept: NEUROLOGY | Facility: CLINIC | Age: 75
End: 2020-01-29

## 2020-01-29 NOTE — PROGRESS NOTES
Gale Fernández is a 74 y.o. year old female that presents for stroke follow up.    HPI:  Mrs Fernández has HTN, HLD, pre-DM, A-fib on Xarelto, anxiety, and history of R parietal infarct.  Stated that she has been doing quite well from a stroke standpoint, without recurrence of stroke like symptoms, cognitive impairment, post stroke fatigue, mood changes, or spells concerning for seizures.  Taking Xarelto faithfully and without noticeable side effects.  She remains experiencing daily burning oral sensation mainly in her tongue and couldn't tolerate a trial of gabapentin.  Otherwise, she denies HA, vertigo, double vision, imbalance, falls, focal weakness, slurred speech, language or vision impairme  Lipid panel 1/2/20: cholesterol 135, LDL 72, HDL 48, triglycerides 78      Past Medical History:   Diagnosis Date    Atrial fibrillation     Followed by EP cardiology    Borderline hyperlipidemia     Cataract     History of abnormal Pap smear     More than 20 years ago; status post colposcopy    History of anxiety disorder     History of hypertension     Currently doing okay off medication    History of ventricular tachycardia     (PSVT) Status post ablation    Hypertension     Osteopenia     Refuses medication    Overweight(278.02)      Social History     Socioeconomic History    Marital status:      Spouse name: Not on file    Number of children: 1    Years of education: Not on file    Highest education level: Not on file   Occupational History    Occupation: Teacher   Social Needs    Financial resource strain: Not on file    Food insecurity:     Worry: Not on file     Inability: Not on file    Transportation needs:     Medical: Not on file     Non-medical: Not on file   Tobacco Use    Smoking status: Former Smoker     Types: Cigarettes    Smokeless tobacco: Never Used   Substance and Sexual Activity    Alcohol use: Yes     Comment: Rarely    Drug use: No    Sexual activity: Not on file    Lifestyle    Physical activity:     Days per week: Not on file     Minutes per session: Not on file    Stress: Not on file   Relationships    Social connections:     Talks on phone: Not on file     Gets together: Not on file     Attends Islam service: Not on file     Active member of club or organization: Not on file     Attends meetings of clubs or organizations: Not on file     Relationship status: Not on file   Other Topics Concern    Not on file   Social History Narrative    Not on file     Past Surgical History:   Procedure Laterality Date     SECTION, LOW TRANSVERSE      X1    left knee meniscus surgery  2012    neck tuck and liposuction  2012    PSVT ablation      Skin cancer removal on the left eye       Family History   Problem Relation Age of Onset    Heart disease Mother     Cancer Father         nasopharynx    Breast cancer Cousin     Hypertension Sister     Diabetes Sister     Cancer Sister     Coronary artery disease Brother         s/p stenting    Hypertension Brother     Rheum arthritis Sister     Hypertension Brother     Hypertension Brother     Heart disease Brother         s/p CABG    Hypertension Brother     Coronary artery disease Brother         s/p stenting    Cancer Brother     No Known Problems Maternal Grandmother     No Known Problems Maternal Grandfather     No Known Problems Paternal Grandmother     No Known Problems Paternal Grandfather     No Known Problems Maternal Aunt     No Known Problems Maternal Uncle     No Known Problems Paternal Aunt     No Known Problems Paternal Uncle     Colon cancer Neg Hx     Amblyopia Neg Hx     Blindness Neg Hx     Cataracts Neg Hx     Glaucoma Neg Hx     Macular degeneration Neg Hx     Retinal detachment Neg Hx     Strabismus Neg Hx     Stroke Neg Hx     Thyroid disease Neg Hx            Review of Systems  General ROS: negative for chills, fever or weight loss  Psychological ROS: negative for  "hallucination, depression or suicidal ideation  Ophthalmic ROS: negative for blurry vision, photophobia or eye pain  ENT ROS: negative for epistaxis, sore throat or rhinorrhea  Respiratory ROS: no cough, shortness of breath, or wheezing  Cardiovascular ROS: no chest pain or dyspnea on exertion  Gastrointestinal ROS: no abdominal pain, change in bowel habits, or black/ bloody stools  Genito-Urinary ROS: no dysuria, trouble voiding, or hematuria  Musculoskeletal ROS: negative for gait disturbance or muscular weakness  Neurological ROS: no syncope or seizures; no ataxia  Dermatological ROS: negative for pruritis, rash and jaundice      Physical Exam:  BP (!) 163/85   Pulse 79   Ht 5' 5" (1.651 m)   Wt 79.8 kg (176 lb)   LMP 07/24/2012   BMI 29.29 kg/m²   General appearance: alert, cooperative, no distress  Constitutional:Oriented to person, place, and time.appears well-developed and well-nourished.   HEENT: Normocephalic, atraumatic, neck symmetrical, no nasal discharge   Eyes: conjunctivae/corneas clear, PERRL, EOM's intact  Lungs: clear to auscultation bilaterally, no dullness to percussion bilaterally  Heart: regular rate and rhythm without rub; no displacement of the PMI   Abdomen: soft, non-tender; bowel sounds normoactive; no organomegaly  Extremities: extremities symmetric; no clubbing, cyanosis, or edema  Integument: Skin color, texture, turgor normal; no rashes; hair distrubution normal  Neurologic: Alert and oriented X 3, normal strength, normal coordination and gait  Psychiatric: no pressured speech; normal affect; no evidence of impaired cognition     LABS:    Complete Blood Count  Lab Results   Component Value Date    RBC 4.67 09/19/2019    HGB 14.2 09/19/2019    HCT 46.1 09/19/2019    MCV 99 (H) 09/19/2019    MCH 30.4 09/19/2019    MCHC 30.8 (L) 09/19/2019    RDW 12.4 09/19/2019     09/19/2019    MPV 9.9 09/19/2019    GRAN 4.8 09/19/2019    GRAN 62.4 09/19/2019    LYMPH 2.1 09/19/2019    LYMPH " 27.6 09/19/2019    MONO 0.5 09/19/2019    MONO 6.9 09/19/2019    EOS 0.2 09/19/2019    BASO 0.04 09/19/2019    EOSINOPHIL 2.3 09/19/2019    BASOPHIL 0.5 09/19/2019    DIFFMETHOD Automated 09/19/2019       Comprehensive Metabolic Panel  Lab Results   Component Value Date    GLU 98 09/19/2019    BUN 16 09/19/2019    CREATININE 0.7 09/19/2019     09/19/2019    K 4.3 09/19/2019     09/19/2019    PROT 6.7 09/19/2019    ALBUMIN 3.5 09/19/2019    BILITOT 0.6 09/19/2019    AST 17 09/19/2019    ALKPHOS 56 09/19/2019    CO2 26 09/19/2019    ALT 11 09/19/2019    ANIONGAP 9 09/19/2019    EGFRNONAA >60.0 09/19/2019    ESTGFRAFRICA >60.0 09/19/2019       TSH  Lab Results   Component Value Date    TSH 0.917 09/18/2019         Assessment: 73 y/o with HTN, HLD, pre-DM, A-fib on Xarelto, anxiety, and history of small R parietal infarct without residual deficit. Stroke mechanism likely cardio embolism in the setting of known a fib.  Doing great, NIHSS 0, Modify [place] Stroke scale 0.  Persistent burning mouth that according to the patient developed after her stroke.         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      Plan:  1) History of embolic R parietal infarct: continue Xarelto for secondary stroke prevention.  2) HTN, as per PCP, target BP <130/80  3) HLD on atorvastatin. LDL on target and no evidence of significant large vessel atherosclerotic disease, thus will lower dose to 20 mg daily.  4) Pre-DM, follow by PCP. Check hemoglobin A1c  5) A fib, continue Xarelto.  6) Burning mouth, unclear etiology, could be stroke related but will get some serologies searching for other potential secondary causes.    Orders Placed This Encounter   Procedures    VITAMIN B12    VITAMIN B1    ZINC    VITAMIN B6    POCT HEMOGLOBIN A1C           Thien Lujan MD

## 2020-02-05 ENCOUNTER — PATIENT MESSAGE (OUTPATIENT)
Dept: NEUROLOGY | Facility: CLINIC | Age: 75
End: 2020-02-05

## 2020-02-05 DIAGNOSIS — E11.9 TYPE 2 DIABETES MELLITUS WITHOUT COMPLICATION, WITHOUT LONG-TERM CURRENT USE OF INSULIN: Primary | ICD-10-CM

## 2020-02-24 ENCOUNTER — TELEPHONE (OUTPATIENT)
Dept: NEUROLOGY | Facility: CLINIC | Age: 75
End: 2020-02-24

## 2020-02-24 NOTE — TELEPHONE ENCOUNTER
----- Message from Danya Birch sent at 2/24/2020 10:37 AM CST -----  Contact: Self  Name of Who is Calling:       What is the request in detail: Pt states she needs an order for A1C Lab placed in the system. Please contact to further discuss and advise.        Can the clinic reply by [facility]: N      What Number to [facility] Back [facility]: [phone]

## 2020-02-27 ENCOUNTER — PATIENT OUTREACH (OUTPATIENT)
Dept: ADMINISTRATIVE | Facility: OTHER | Age: 75
End: 2020-02-27

## 2020-02-28 ENCOUNTER — LAB VISIT (OUTPATIENT)
Dept: LAB | Facility: HOSPITAL | Age: 75
End: 2020-02-28
Attending: PSYCHIATRY & NEUROLOGY
Payer: COMMERCIAL

## 2020-02-28 DIAGNOSIS — K14.6 BURNING MOUTH SYNDROME: ICD-10-CM

## 2020-02-28 LAB — VIT B12 SERPL-MCNC: 534 PG/ML (ref 210–950)

## 2020-02-28 PROCEDURE — 84207 ASSAY OF VITAMIN B-6: CPT

## 2020-02-28 PROCEDURE — 36415 COLL VENOUS BLD VENIPUNCTURE: CPT | Mod: PO

## 2020-02-28 PROCEDURE — 84425 ASSAY OF VITAMIN B-1: CPT

## 2020-02-28 PROCEDURE — 84630 ASSAY OF ZINC: CPT

## 2020-02-28 PROCEDURE — 82607 VITAMIN B-12: CPT

## 2020-03-03 LAB
PYRIDOXAL SERPL-MCNC: 10 UG/L (ref 5–50)
ZINC SERPL-MCNC: 57 UG/DL (ref 60–130)

## 2020-03-04 LAB — VIT B1 BLD-MCNC: 63 UG/L (ref 38–122)

## 2020-03-05 ENCOUNTER — PATIENT MESSAGE (OUTPATIENT)
Dept: NEUROLOGY | Facility: CLINIC | Age: 75
End: 2020-03-05

## 2020-03-12 ENCOUNTER — HOSPITAL ENCOUNTER (EMERGENCY)
Facility: HOSPITAL | Age: 75
Discharge: HOME | End: 2020-03-12
Attending: EMERGENCY MEDICINE
Payer: COMMERCIAL

## 2020-03-12 ENCOUNTER — TELEPHONE (OUTPATIENT)
Dept: ELECTROPHYSIOLOGY | Facility: CLINIC | Age: 75
End: 2020-03-12

## 2020-03-12 VITALS
OXYGEN SATURATION: 99 % | SYSTOLIC BLOOD PRESSURE: 179 MMHG | BODY MASS INDEX: 29.62 KG/M2 | DIASTOLIC BLOOD PRESSURE: 89 MMHG | RESPIRATION RATE: 18 BRPM | WEIGHT: 178 LBS | HEART RATE: 92 BPM | TEMPERATURE: 99 F

## 2020-03-12 DIAGNOSIS — I95.1 ORTHOSTATIC HYPOTENSION: Primary | ICD-10-CM

## 2020-03-12 DIAGNOSIS — R42 DIZZY: ICD-10-CM

## 2020-03-12 DIAGNOSIS — R42 DIZZINESS: ICD-10-CM

## 2020-03-12 DIAGNOSIS — F41.9 ANXIETY: ICD-10-CM

## 2020-03-12 LAB
ALBUMIN SERPL-MCNC: 4.4 G/DL (ref 3.3–5.5)
ALP SERPL-CCNC: 62 U/L (ref 42–141)
BILIRUB SERPL-MCNC: 0.8 MG/DL (ref 0.2–1.6)
BILIRUBIN, POC UA: NEGATIVE
BLOOD, POC UA: ABNORMAL
BUN SERPL-MCNC: 13 MG/DL (ref 7–22)
CALCIUM SERPL-MCNC: 10 MG/DL (ref 8–10.3)
CHLORIDE SERPL-SCNC: 104 MMOL/L (ref 98–108)
CLARITY, POC UA: CLEAR
COLOR, POC UA: YELLOW
CREAT SERPL-MCNC: 0.7 MG/DL (ref 0.6–1.2)
GLUCOSE SERPL-MCNC: 117 MG/DL (ref 73–118)
GLUCOSE, POC UA: NEGATIVE
KETONES, POC UA: NEGATIVE
LEUKOCYTE EST, POC UA: NEGATIVE
NITRITE, POC UA: NEGATIVE
PH UR STRIP: 8.5 [PH]
POC ALT (SGPT): 20 U/L (ref 10–47)
POC AST (SGOT): 30 U/L (ref 11–38)
POC B-TYPE NATRIURETIC PEPTIDE: 226 PG/ML (ref 0–100)
POC CARDIAC TROPONIN I: 0.01 NG/ML
POC PTINR: 1.3 (ref 0.9–1.2)
POC PTWBT: 15 SEC (ref 9.7–14.3)
POC TCO2: 26 MMOL/L (ref 18–33)
POTASSIUM BLD-SCNC: 4.3 MMOL/L (ref 3.6–5.1)
PROTEIN, POC UA: NEGATIVE
PROTEIN, POC: 7.8 G/DL (ref 6.4–8.1)
SAMPLE: ABNORMAL
SAMPLE: NORMAL
SODIUM BLD-SCNC: 143 MMOL/L (ref 128–145)
SPECIFIC GRAVITY, POC UA: 1.02
UROBILINOGEN, POC UA: 0.2 E.U./DL

## 2020-03-12 PROCEDURE — 99284 EMERGENCY DEPT VISIT MOD MDM: CPT | Mod: 25,ER

## 2020-03-12 PROCEDURE — 85025 COMPLETE CBC W/AUTO DIFF WBC: CPT | Mod: ER

## 2020-03-12 PROCEDURE — 93010 ELECTROCARDIOGRAM REPORT: CPT | Mod: ,,, | Performed by: INTERNAL MEDICINE

## 2020-03-12 PROCEDURE — 83880 ASSAY OF NATRIURETIC PEPTIDE: CPT | Mod: ER

## 2020-03-12 PROCEDURE — 85610 PROTHROMBIN TIME: CPT | Mod: ER

## 2020-03-12 PROCEDURE — 63600175 PHARM REV CODE 636 W HCPCS: Mod: ER | Performed by: EMERGENCY MEDICINE

## 2020-03-12 PROCEDURE — 84484 ASSAY OF TROPONIN QUANT: CPT | Mod: ER

## 2020-03-12 PROCEDURE — 80053 COMPREHEN METABOLIC PANEL: CPT | Mod: ER

## 2020-03-12 PROCEDURE — 93005 ELECTROCARDIOGRAM TRACING: CPT | Mod: ER

## 2020-03-12 PROCEDURE — 96360 HYDRATION IV INFUSION INIT: CPT | Mod: ER

## 2020-03-12 PROCEDURE — 93010 EKG 12-LEAD: ICD-10-PCS | Mod: ,,, | Performed by: INTERNAL MEDICINE

## 2020-03-12 PROCEDURE — 81003 URINALYSIS AUTO W/O SCOPE: CPT | Mod: ER

## 2020-03-12 RX ORDER — ALPRAZOLAM 0.25 MG/1
0.25 TABLET ORAL
Status: DISCONTINUED | OUTPATIENT
Start: 2020-03-12 | End: 2020-03-12 | Stop reason: HOSPADM

## 2020-03-12 RX ORDER — HYDRALAZINE HYDROCHLORIDE 10 MG/1
10 TABLET, FILM COATED ORAL 2 TIMES DAILY
COMMUNITY
Start: 2020-02-10 | End: 2021-07-20

## 2020-03-12 RX ADMIN — SODIUM CHLORIDE 1000 ML: 0.9 INJECTION, SOLUTION INTRAVENOUS at 11:03

## 2020-03-12 NOTE — ED PROVIDER NOTES
"Encounter Date: 3/12/2020    SCRIBE #1 NOTE: I, Florecita Nicole, am scribing for, and in the presence of,  Dr. Woods. I have scribed the following portions of the note - Other sections scribed: HPI, ROS, PE.       History     Chief Complaint   Patient presents with    Hypertension     Pt states," My pressure is up and I feel lite headed since yesterday. I am dizzy when I walk."    Dizziness     74 year old hypertensive female complains of dizziness and fatigue since yesterday. She notes when she stands up the symptoms worsen. She denies nausea, vomiting, fever, chills, abdominal pain, constipation and palpations. Patient reports being stressed out because she is a 9th . Patient reports taking 0.5 a tablet of Hydralazine this morning.     The history is provided by the patient. No  was used.     Review of patient's allergies indicates:   Allergen Reactions    Flecainide Other (See Comments)     Other reaction(s): worsening arrythmia     Past Medical History:   Diagnosis Date    Atrial fibrillation     Followed by EP cardiology    Borderline hyperlipidemia     Cataract     History of abnormal Pap smear     More than 20 years ago; status post colposcopy    History of anxiety disorder     History of hypertension     Currently doing okay off medication    History of ventricular tachycardia     (PSVT) Status post ablation    Hypertension     Osteopenia     Refuses medication    Overweight(278.02)      Past Surgical History:   Procedure Laterality Date     SECTION, LOW TRANSVERSE      X1    left knee meniscus surgery  2012    neck tuck and liposuction      PSVT ablation      Skin cancer removal on the left eye       Family History   Problem Relation Age of Onset    Heart disease Mother     Cancer Father         nasopharynx    Breast cancer Cousin     Hypertension Sister     Diabetes Sister     Cancer Sister     Coronary artery disease Brother         s/p " stenting    Hypertension Brother     Rheum arthritis Sister     Hypertension Brother     Hypertension Brother     Heart disease Brother         s/p CABG    Hypertension Brother     Coronary artery disease Brother         s/p stenting    Cancer Brother     No Known Problems Maternal Grandmother     No Known Problems Maternal Grandfather     No Known Problems Paternal Grandmother     No Known Problems Paternal Grandfather     No Known Problems Maternal Aunt     No Known Problems Maternal Uncle     No Known Problems Paternal Aunt     No Known Problems Paternal Uncle     Colon cancer Neg Hx     Amblyopia Neg Hx     Blindness Neg Hx     Cataracts Neg Hx     Glaucoma Neg Hx     Macular degeneration Neg Hx     Retinal detachment Neg Hx     Strabismus Neg Hx     Stroke Neg Hx     Thyroid disease Neg Hx      Social History     Tobacco Use    Smoking status: Former Smoker     Types: Cigarettes    Smokeless tobacco: Never Used   Substance Use Topics    Alcohol use: Yes     Comment: Rarely    Drug use: No     Review of Systems   Constitutional: Positive for fatigue. Negative for fever.   HENT: Negative for rhinorrhea and sore throat.    Eyes: Negative for redness.   Respiratory: Negative for shortness of breath.    Cardiovascular: Negative for chest pain and leg swelling.   Gastrointestinal: Negative for abdominal pain, diarrhea, nausea and vomiting.   Musculoskeletal: Negative for back pain.   Skin: Negative for rash.   Neurological: Positive for dizziness. Negative for syncope and headaches.   All other systems reviewed and are negative.      Physical Exam     Initial Vitals [03/12/20 0952]   BP Pulse Resp Temp SpO2   (!) 170/89 95 16 98.5 °F (36.9 °C) 99 %      MAP       --         Physical Exam    Nursing note and vitals reviewed.  Constitutional: She appears well-developed and well-nourished.   HENT:   Head: Normocephalic and atraumatic.   Right Ear: External ear normal.   Left Ear: External  ear normal.   Nose: Nose normal.   Mouth/Throat: Oropharynx is clear and moist.   Eyes: Conjunctivae and EOM are normal. Pupils are equal, round, and reactive to light.   Neck: Normal range of motion and phonation normal. Neck supple.   Cardiovascular: Normal rate, regular rhythm, normal heart sounds and intact distal pulses. Exam reveals no gallop and no friction rub.    No murmur heard.  Pulmonary/Chest: Effort normal and breath sounds normal. No stridor. No respiratory distress. She has no wheezes. She has no rhonchi. She has no rales. She exhibits no tenderness.   Abdominal: Soft. Bowel sounds are normal. She exhibits no distension. There is no tenderness. There is no rigidity, no rebound and no guarding.   Musculoskeletal: Normal range of motion. She exhibits no edema or tenderness.   Neurological: She is alert and oriented to person, place, and time. She has normal strength. No cranial nerve deficit or sensory deficit. She displays a negative Romberg sign. GCS score is 15. GCS eye subscore is 4. GCS verbal subscore is 5. GCS motor subscore is 6.   No focal neurological deficit   Skin: Skin is warm and dry. Capillary refill takes less than 2 seconds. No rash noted.   Psychiatric: She has a normal mood and affect. Her behavior is normal.         ED Course   Procedures  Labs Reviewed   POCT URINALYSIS W/O SCOPE - Abnormal; Notable for the following components:       Result Value    Blood, UA Trace-lysed (*)     All other components within normal limits   POCT B-TYPE NATRIURETIC PEPTIDE (BNP) - Abnormal; Notable for the following components:    POC B-Type Natriuretic Peptide 226 (*)     All other components within normal limits   ISTAT PROCEDURE - Abnormal; Notable for the following components:    POC PTWBT 15.0 (*)     POC PTINR 1.3 (*)     All other components within normal limits   TROPONIN ISTAT   POCT CBC   POCT URINALYSIS W/O SCOPE   POCT CMP   POCT PROTIME-INR   POCT TROPONIN   POCT B-TYPE NATRIURETIC  PEPTIDE (BNP)   POCT CMP             EKG Readings: (Independently Interpreted)   No STEMI. Rate of 100. Normal Sinus Rhythm. Normal Axis. Abnormal EKG. QTc normal at 477. When compared to prior EKG dated 9/18/10 rate increased by 36 bpm.        ECG Results          EKG 12-lead (In process)  Result time 03/12/20 10:29:12    In process by Interface, Lab In [facility] (03/12/20 10:29:12)                 Narrative:    Test Reason : R42,    Vent. Rate : 100 BPM     Atrial Rate : 087 BPM     P-R Int : 000 ms          QRS Dur : 080 ms      QT Int : 370 ms       P-R-T Axes : 000 011 -17 degrees     QTc Int : 477 ms    Accelerated Junctional rhythm with Premature supraventricular complexes and   with occasional Premature ventricular complexes  Low voltage QRS  Septal infarct (cited on or before 12-MAR-2020)  Abnormal ECG  When compared with ECG of 03-JAN-2020 08:37,  Junctional rhythm has replaced Sinus rhythm  Vent. rate has increased BY  54 BPM  ST now depressed in Inferior leads  ST now depressed in Anterior leads  Nonspecific T wave abnormality now evident in Anterior leads    Referred By: [email or web address]   SELF           Confirmed By:                             Imaging Results    None          Medical Decision Making:   History:   Old Medical Records: I decided to obtain old medical records.  Chief complaint: Dizziness and Fatigue  Differential diagnosis:Orthostatic Hypertension, Dehydration, CVA, Intracranial mass, TRACY, STEMI, NSTEMI, and Cardiac Arrhythmia    Treatment in the ED: PE, IV fluid.  Patient declined anxiety medicines in the ER she wants to drive herself home  Patient reports feeling better after treatment in the ER.      Discussed treatment, prescriptions, labs, and imaging results.      Fill and take prescriptions as directed.  Return to the ED if symptoms worsen or do not resolve.   Answered questions and discussed discharge plan.    Patient feels better and is ready for discharge.  Follow up with PCP/specialist  in 1 day.            Scribe Attestation:   Scribe #1: I performed the above scribed service and the documentation accurately describes the services I performed. I attest to the accuracy of the note.     I, Dr. Estelle Woods, personally performed the services described in this documentation. This document was produced by a scribe under my direction and in my presence. All medical record entries made by the scribe were at my direction and in my presence.  I have reviewed the chart and agree that the record reflects my personal performance and is accurate and complete. Estelle Woods, .     03/14/2020 10:41 AM                        Clinical Impression:     1. Orthostatic hypotension    2. Dizzy    3. Dizziness    4. Anxiety                                   Estelle Woods,   03/14/20 1042

## 2020-03-12 NOTE — TELEPHONE ENCOUNTER
Returned call to pt. Advised that Vita Fox NP would like for her to follow up with PCP to follow her HTN. In the meantime she recommends that you increase the amlodipine to 5 mg qd while waiting for an appt. Pt voiced understanding and stated she was trying to schedule one.         ----- Message from Vita Fox NP sent at 3/12/2020  4:46 PM CDT -----  Contact: Self  Yes her PCP should be managing her HTN comprehensively. In the meantime she can try increasing her amlodipine to 5mg daily while she is waiting for the PCP appt.  ----- Message -----  From: Cheli Pennington RN  Sent: 3/12/2020   4:33 PM CDT  To: Vita Fox NP    In your note from 2020 the patient was started on amlodipine for better blood pressure control. Do you want to adjust meds or not?? Send to PCP??  ----- Message -----  From: Trish Martinez  Sent: 3/12/2020   3:33 PM CDT  To: Tena Greenberg RN    Pt is having elevated blood pressure, Pt went ER today @ discharge her readin/80 but her diastolic has been running high. Pt has been waking up feeling light headed & dizzy.  Thanks     [phone]

## 2020-03-13 DIAGNOSIS — Z86.79 HISTORY OF HYPERTENSION: ICD-10-CM

## 2020-03-13 RX ORDER — AMLODIPINE BESYLATE 2.5 MG/1
5 TABLET ORAL DAILY
Qty: 90 TABLET | Refills: 3 | Status: SHIPPED | OUTPATIENT
Start: 2020-03-13 | End: 2020-11-06 | Stop reason: SDUPTHER

## 2020-03-17 ENCOUNTER — TELEPHONE (OUTPATIENT)
Dept: OPTOMETRY | Facility: CLINIC | Age: 75
End: 2020-03-17

## 2020-03-20 PROBLEM — G45.9 TIA (TRANSIENT ISCHEMIC ATTACK): Status: RESOLVED | Noted: 2019-09-19 | Resolved: 2020-03-20

## 2020-04-13 DIAGNOSIS — F41.9 ANXIETY: ICD-10-CM

## 2020-04-13 NOTE — TELEPHONE ENCOUNTER
Message from patient states [facility] is preferred pharmacy yet medication has highlighted message that patient wants Rx sent to [facility] - please clarify/update.  Also, Rx is listed as a print - please correct.

## 2020-04-14 RX ORDER — ALPRAZOLAM 0.25 MG/1
TABLET ORAL
Qty: 30 TABLET | Refills: 0 | Status: SHIPPED | OUTPATIENT
Start: 2020-04-14 | End: 2020-07-15 | Stop reason: SDUPTHER

## 2020-04-14 NOTE — TELEPHONE ENCOUNTER
Spoke with the patient and she wants her medication send to [facility].  Patient verbalized understandings.

## 2020-04-29 ENCOUNTER — PATIENT MESSAGE (OUTPATIENT)
Dept: ELECTROPHYSIOLOGY | Facility: CLINIC | Age: 75
End: 2020-04-29

## 2020-04-29 ENCOUNTER — TELEPHONE (OUTPATIENT)
Dept: ELECTROPHYSIOLOGY | Facility: CLINIC | Age: 75
End: 2020-04-29

## 2020-04-29 ENCOUNTER — PATIENT MESSAGE (OUTPATIENT)
Dept: FAMILY MEDICINE | Facility: CLINIC | Age: 75
End: 2020-04-29

## 2020-04-29 DIAGNOSIS — I48.91 ATRIAL FIBRILLATION, UNSPECIFIED TYPE: Primary | ICD-10-CM

## 2020-04-29 NOTE — TELEPHONE ENCOUNTER
Spoke with Ms. Fernández and let her know that I would discuss her issues with  and get back with her. Patient verbalized understanding and appreciated call.

## 2020-05-07 ENCOUNTER — TELEPHONE (OUTPATIENT)
Dept: CARDIOLOGY | Facility: HOSPITAL | Age: 75
End: 2020-05-07

## 2020-05-07 NOTE — TELEPHONE ENCOUNTER
Called patient to let her know that her insurance does not cover the ZIO patch. No answer. Waiting for patient to return my call. I left my direct number. I want to see if she would still like to wear the monitor and need her to know that the cost to her from [employer] will be $995.

## 2020-05-13 ENCOUNTER — LAB VISIT (OUTPATIENT)
Dept: PRIMARY CARE CLINIC | Facility: CLINIC | Age: 75
End: 2020-05-13
Payer: COMMERCIAL

## 2020-05-13 DIAGNOSIS — Z00.6 RESEARCH STUDY PATIENT: Primary | ICD-10-CM

## 2020-05-13 LAB — SARS-COV-2 IGG SERPLBLD QL IA.RAPID: NEGATIVE

## 2020-05-13 PROCEDURE — U0002 COVID-19 LAB TEST NON-CDC: HCPCS

## 2020-05-13 PROCEDURE — 86769 SARS-COV-2 COVID-19 ANTIBODY: CPT

## 2020-05-13 NOTE — RESEARCH
Date of Consent: 05/13/2020    Sponsor: [facility]    Study Title/IRB Number: Observational study of Sars-CoV2 Immunoglobulin G (IgG) seroprevalence among the [facility] population over time 2020.163  Principle Investigator: Layne Ramos, PhD    Did the patient need translation services? No   name: N/A    Prior to the Informed Consent (IC) being signed, or any study protocol required data collection, testing, procedure, or intervention being performed, the following was done and/or discussed:   Patient was given a paper copy of the IC for review    Patient was given study FAQ   Purpose of the study and qualifications to participate    Study design and tests or procedures done at this visit   Confidentiality and HIPAA Authorization for Release of Medical Records for the research trial/ subject's rights/research related injury   Risk, Benefits, Alternative Treatments, Compensation and Costs   Participation in the research trial is voluntary and patient may withdraw at anytime   Contact information for study related questions    Patient verbalizes understanding of the above: Yes  Contact information for PI and IRB given to patient: Yes  Patient able to adequately summarize: the purpose of the study, the risks associated with the study, and all procedures, testing, and follow-ups associated with the study: Yes    The consent was discussed verbally with the patient and all questions were answered satisfactorily. Patient gave verbal consent for the Seroprevalence research study with an IRB approval date of 05/08/2020.      The Consent, Consent Witness and name of Clinical Research Coordinator consenting was captured and documented in [employer].    All Inclusion and Exclusion Criteria reviewed, subject meets all Inclusion criteria and does not meet any Exclusion Criteria at this time.     Patient Eligibility was confirmed.    Patient responded to survey questions.    The following biospecimen  collection procedures were collected:    -Nasopharyngeal Swab Collection  -Blood collection

## 2020-05-14 LAB — SARS-COV-2 RNA RESP QL NAA+PROBE: NOT DETECTED

## 2020-05-18 ENCOUNTER — TELEPHONE (OUTPATIENT)
Dept: CARDIOLOGY | Facility: HOSPITAL | Age: 75
End: 2020-05-18

## 2020-07-06 ENCOUNTER — PATIENT MESSAGE (OUTPATIENT)
Dept: FAMILY MEDICINE | Facility: CLINIC | Age: 75
End: 2020-07-06

## 2020-07-06 DIAGNOSIS — E78.5 BORDERLINE HYPERLIPIDEMIA: Primary | ICD-10-CM

## 2020-07-06 DIAGNOSIS — R73.03 PREDIABETES: ICD-10-CM

## 2020-07-06 DIAGNOSIS — I10 ESSENTIAL HYPERTENSION: ICD-10-CM

## 2020-07-08 ENCOUNTER — LAB VISIT (OUTPATIENT)
Dept: LAB | Facility: HOSPITAL | Age: 75
End: 2020-07-08
Attending: INTERNAL MEDICINE
Payer: COMMERCIAL

## 2020-07-08 DIAGNOSIS — R73.03 PREDIABETES: ICD-10-CM

## 2020-07-08 LAB
ESTIMATED AVG GLUCOSE: 114 MG/DL (ref 68–131)
HBA1C MFR BLD HPLC: 5.6 % (ref 4–5.6)

## 2020-07-08 PROCEDURE — 36415 COLL VENOUS BLD VENIPUNCTURE: CPT | Mod: PO

## 2020-07-08 PROCEDURE — 83036 HEMOGLOBIN GLYCOSYLATED A1C: CPT

## 2020-07-13 ENCOUNTER — LAB VISIT (OUTPATIENT)
Dept: LAB | Facility: HOSPITAL | Age: 75
End: 2020-07-13
Attending: INTERNAL MEDICINE
Payer: COMMERCIAL

## 2020-07-13 DIAGNOSIS — E78.5 BORDERLINE HYPERLIPIDEMIA: ICD-10-CM

## 2020-07-13 DIAGNOSIS — I10 ESSENTIAL HYPERTENSION: ICD-10-CM

## 2020-07-13 PROBLEM — E66.3 OVERWEIGHT (BMI 25.0-29.9): Status: ACTIVE | Noted: 2020-07-13

## 2020-07-13 LAB
ALBUMIN SERPL BCP-MCNC: 3.6 G/DL (ref 3.5–5.2)
ALP SERPL-CCNC: 56 U/L (ref 55–135)
ALT SERPL W/O P-5'-P-CCNC: 17 U/L (ref 10–44)
ANION GAP SERPL CALC-SCNC: 7 MMOL/L (ref 8–16)
AST SERPL-CCNC: 26 U/L (ref 10–40)
BASOPHILS # BLD AUTO: 0.04 K/UL (ref 0–0.2)
BASOPHILS NFR BLD: 0.6 % (ref 0–1.9)
BILIRUB SERPL-MCNC: 0.4 MG/DL (ref 0.1–1)
BUN SERPL-MCNC: 22 MG/DL (ref 8–23)
CALCIUM SERPL-MCNC: 8.8 MG/DL (ref 8.7–10.5)
CHLORIDE SERPL-SCNC: 108 MMOL/L (ref 95–110)
CHOLEST SERPL-MCNC: 127 MG/DL (ref 120–199)
CHOLEST/HDLC SERPL: 2.8 {RATIO} (ref 2–5)
CO2 SERPL-SCNC: 24 MMOL/L (ref 23–29)
CREAT SERPL-MCNC: 0.8 MG/DL (ref 0.5–1.4)
DIFFERENTIAL METHOD: ABNORMAL
EOSINOPHIL # BLD AUTO: 0.5 K/UL (ref 0–0.5)
EOSINOPHIL NFR BLD: 7.7 % (ref 0–8)
ERYTHROCYTE [DISTWIDTH] IN BLOOD BY AUTOMATED COUNT: 12.3 % (ref 11.5–14.5)
EST. GFR  (AFRICAN AMERICAN): >60 ML/MIN/1.73 M^2
EST. GFR  (NON AFRICAN AMERICAN): >60 ML/MIN/1.73 M^2
GLUCOSE SERPL-MCNC: 109 MG/DL (ref 70–110)
HCT VFR BLD AUTO: 43.9 % (ref 37–48.5)
HDLC SERPL-MCNC: 46 MG/DL (ref 40–75)
HDLC SERPL: 36.2 % (ref 20–50)
HGB BLD-MCNC: 13.4 G/DL (ref 12–16)
IMM GRANULOCYTES # BLD AUTO: 0.01 K/UL (ref 0–0.04)
IMM GRANULOCYTES NFR BLD AUTO: 0.1 % (ref 0–0.5)
LDLC SERPL CALC-MCNC: 68.2 MG/DL (ref 63–159)
LYMPHOCYTES # BLD AUTO: 2.2 K/UL (ref 1–4.8)
LYMPHOCYTES NFR BLD: 31.1 % (ref 18–48)
MCH RBC QN AUTO: 30.6 PG (ref 27–31)
MCHC RBC AUTO-ENTMCNC: 30.5 G/DL (ref 32–36)
MCV RBC AUTO: 100 FL (ref 82–98)
MONOCYTES # BLD AUTO: 0.6 K/UL (ref 0.3–1)
MONOCYTES NFR BLD: 8.2 % (ref 4–15)
NEUTROPHILS # BLD AUTO: 3.7 K/UL (ref 1.8–7.7)
NEUTROPHILS NFR BLD: 52.3 % (ref 38–73)
NONHDLC SERPL-MCNC: 81 MG/DL
NRBC BLD-RTO: 0 /100 WBC
PLATELET # BLD AUTO: 233 K/UL (ref 150–350)
PMV BLD AUTO: 10.7 FL (ref 9.2–12.9)
POTASSIUM SERPL-SCNC: 4.8 MMOL/L (ref 3.5–5.1)
PROT SERPL-MCNC: 6.9 G/DL (ref 6–8.4)
RBC # BLD AUTO: 4.38 M/UL (ref 4–5.4)
SODIUM SERPL-SCNC: 139 MMOL/L (ref 136–145)
TRIGL SERPL-MCNC: 64 MG/DL (ref 30–150)
WBC # BLD AUTO: 7.05 K/UL (ref 3.9–12.7)

## 2020-07-13 PROCEDURE — 36415 COLL VENOUS BLD VENIPUNCTURE: CPT | Mod: PO

## 2020-07-13 PROCEDURE — 80053 COMPREHEN METABOLIC PANEL: CPT

## 2020-07-13 PROCEDURE — 80061 LIPID PANEL: CPT

## 2020-07-13 PROCEDURE — 85025 COMPLETE CBC W/AUTO DIFF WBC: CPT

## 2020-07-15 ENCOUNTER — HOSPITAL ENCOUNTER (OUTPATIENT)
Dept: RADIOLOGY | Facility: HOSPITAL | Age: 75
Discharge: HOME | End: 2020-07-15
Attending: INTERNAL MEDICINE
Payer: COMMERCIAL

## 2020-07-15 ENCOUNTER — OFFICE VISIT (OUTPATIENT)
Dept: FAMILY MEDICINE | Facility: CLINIC | Age: 75
End: 2020-07-15
Payer: COMMERCIAL

## 2020-07-15 VITALS
HEART RATE: 72 BPM | BODY MASS INDEX: 30.93 KG/M2 | TEMPERATURE: 98 F | SYSTOLIC BLOOD PRESSURE: 123 MMHG | DIASTOLIC BLOOD PRESSURE: 67 MMHG | WEIGHT: 185.63 LBS | OXYGEN SATURATION: 96 % | HEIGHT: 65 IN

## 2020-07-15 DIAGNOSIS — Z78.0 OSTEOPENIA AFTER MENOPAUSE: ICD-10-CM

## 2020-07-15 DIAGNOSIS — Z12.11 COLON CANCER SCREENING: ICD-10-CM

## 2020-07-15 DIAGNOSIS — E78.5 BORDERLINE HYPERLIPIDEMIA: ICD-10-CM

## 2020-07-15 DIAGNOSIS — Z71.89 ADVANCED DIRECTIVES, COUNSELING/DISCUSSION: ICD-10-CM

## 2020-07-15 DIAGNOSIS — K14.6 BURNING MOUTH SYNDROME: ICD-10-CM

## 2020-07-15 DIAGNOSIS — Z86.73 HISTORY OF STROKE: ICD-10-CM

## 2020-07-15 DIAGNOSIS — I10 ESSENTIAL HYPERTENSION: ICD-10-CM

## 2020-07-15 DIAGNOSIS — Z12.31 ENCOUNTER FOR SCREENING MAMMOGRAM FOR BREAST CANCER: ICD-10-CM

## 2020-07-15 DIAGNOSIS — M85.80 OSTEOPENIA AFTER MENOPAUSE: ICD-10-CM

## 2020-07-15 DIAGNOSIS — E66.3 OVERWEIGHT (BMI 25.0-29.9): ICD-10-CM

## 2020-07-15 DIAGNOSIS — Z00.00 ROUTINE MEDICAL EXAM: Primary | ICD-10-CM

## 2020-07-15 DIAGNOSIS — F41.9 ANXIETY: ICD-10-CM

## 2020-07-15 DIAGNOSIS — N81.4 PROLAPSED UTERUS: ICD-10-CM

## 2020-07-15 DIAGNOSIS — R73.03 PREDIABETES: ICD-10-CM

## 2020-07-15 DIAGNOSIS — Z23 NEED FOR SHINGLES VACCINE: ICD-10-CM

## 2020-07-15 DIAGNOSIS — I48.91 ATRIAL FIBRILLATION, UNSPECIFIED TYPE: ICD-10-CM

## 2020-07-15 PROCEDURE — 99397 PER PM REEVAL EST PAT 65+ YR: CPT | Mod: 25,S$GLB,, | Performed by: INTERNAL MEDICINE

## 2020-07-15 PROCEDURE — 99999 PR PBB SHADOW E&M-EST. PATIENT-LVL V: ICD-10-PCS | Mod: PBBFAC,,, | Performed by: INTERNAL MEDICINE

## 2020-07-15 PROCEDURE — 77067 SCR MAMMO BI INCL CAD: CPT | Mod: 26,,, | Performed by: RADIOLOGY

## 2020-07-15 PROCEDURE — 77067 MAMMO DIGITAL SCREENING BILAT WITH TOMOSYNTHESIS_CAD: ICD-10-PCS | Mod: 26,,, | Performed by: RADIOLOGY

## 2020-07-15 PROCEDURE — 77063 BREAST TOMOSYNTHESIS BI: CPT | Mod: 26,,, | Performed by: RADIOLOGY

## 2020-07-15 PROCEDURE — 90471 ZOSTER RECOMBINANT VACCINE: ICD-10-PCS | Mod: S$GLB,,, | Performed by: INTERNAL MEDICINE

## 2020-07-15 PROCEDURE — 3074F PR MOST RECENT SYSTOLIC BLOOD PRESSURE < 130 MM HG: ICD-10-PCS | Mod: CPTII,S$GLB,, | Performed by: INTERNAL MEDICINE

## 2020-07-15 PROCEDURE — 3078F PR MOST RECENT DIASTOLIC BLOOD PRESSURE < 80 MM HG: ICD-10-PCS | Mod: CPTII,S$GLB,, | Performed by: INTERNAL MEDICINE

## 2020-07-15 PROCEDURE — 3074F SYST BP LT 130 MM HG: CPT | Mod: CPTII,S$GLB,, | Performed by: INTERNAL MEDICINE

## 2020-07-15 PROCEDURE — 90471 IMMUNIZATION ADMIN: CPT | Mod: S$GLB,,, | Performed by: INTERNAL MEDICINE

## 2020-07-15 PROCEDURE — 77067 SCR MAMMO BI INCL CAD: CPT | Mod: TC,PO

## 2020-07-15 PROCEDURE — 99999 PR PBB SHADOW E&M-EST. PATIENT-LVL V: CPT | Mod: PBBFAC,,, | Performed by: INTERNAL MEDICINE

## 2020-07-15 PROCEDURE — 77063 MAMMO DIGITAL SCREENING BILAT WITH TOMOSYNTHESIS_CAD: ICD-10-PCS | Mod: 26,,, | Performed by: RADIOLOGY

## 2020-07-15 PROCEDURE — 90750 HZV VACC RECOMBINANT IM: CPT | Mod: S$GLB,,, | Performed by: INTERNAL MEDICINE

## 2020-07-15 PROCEDURE — 90750 ZOSTER RECOMBINANT VACCINE: ICD-10-PCS | Mod: S$GLB,,, | Performed by: INTERNAL MEDICINE

## 2020-07-15 PROCEDURE — 3078F DIAST BP <80 MM HG: CPT | Mod: CPTII,S$GLB,, | Performed by: INTERNAL MEDICINE

## 2020-07-15 PROCEDURE — 99397 PR PREVENTIVE VISIT,EST,65 & OVER: ICD-10-PCS | Mod: 25,S$GLB,, | Performed by: INTERNAL MEDICINE

## 2020-07-15 RX ORDER — ALPRAZOLAM 0.25 MG/1
TABLET ORAL
Qty: 30 TABLET | Refills: 0 | Status: SHIPPED | OUTPATIENT
Start: 2020-07-15 | End: 2021-07-20

## 2020-07-15 RX ORDER — GABAPENTIN 100 MG/1
100 CAPSULE ORAL 3 TIMES DAILY
COMMUNITY
End: 2020-11-10 | Stop reason: SDUPTHER

## 2020-07-15 NOTE — PROGRESS NOTES
HISTORY OF PRESENT ILLNESS:  Gale Fernández is a 75 y.o. female who presents to the clinic today for a routine physical exam. Her last physical exam was approximately 1 years(s) ago.        PAST MEDICAL HISTORY:  Past Medical History:   Diagnosis Date    Atrial fibrillation     Followed by EP cardiology    Borderline hyperlipidemia     Cataract     History of abnormal Pap smear     More than 20 years ago; status post colposcopy    History of anxiety disorder     History of hypertension     Currently doing okay off medication    History of ventricular tachycardia     (PSVT) Status post ablation    Hypertension     Osteopenia     Refuses medication    Overweight(278.02)        PAST SURGICAL HISTORY:  Past Surgical History:   Procedure Laterality Date     SECTION, LOW TRANSVERSE      X1    left knee meniscus surgery  2012    neck tuck and liposuction  2012    PSVT ablation      Skin cancer removal on the left eye         SOCIAL HISTORY:  Social History     Socioeconomic History    Marital status:      Spouse name: Not on file    Number of children: 1    Years of education: Not on file    Highest education level: Not on file   Occupational History    Occupation: Teacher   Social Needs    Financial resource strain: Not hard at all    Food insecurity     Worry: Never true     Inability: Never true    Transportation needs     Medical: No     Non-medical: No   Tobacco Use    Smoking status: Former Smoker     Types: Cigarettes    Smokeless tobacco: Never Used   Substance and Sexual Activity    Alcohol use: Yes     Frequency: 2-4 times a month     Drinks per session: 1 or 2     Binge frequency: Never     Comment: Rarely    Drug use: No    Sexual activity: Not on file   Lifestyle    Physical activity     Days per week: 2 days     Minutes per session: 30 min    Stress: Very much   Relationships    Social connections     Talks on phone: More than three times a week     Gets together:  Never     Attends Mosque service: Not on file     Active member of club or organization: Yes     Attends meetings of clubs or organizations: More than 4 times per year     Relationship status:    Other Topics Concern    Not on file   Social History Narrative    Not on file       FAMILY HISTORY:  Family History   Problem Relation Age of Onset    Heart disease Mother     Cancer Father         nasopharynx    Breast cancer Cousin     Hypertension Sister     Diabetes Sister     Cancer Sister     Coronary artery disease Brother         s/p stenting    Hypertension Brother     Rheum arthritis Sister     Hypertension Brother     Hypertension Brother     Heart disease Brother         s/p CABG    Hypertension Brother     Coronary artery disease Brother         s/p stenting    Cancer Brother     No Known Problems Maternal Grandmother     No Known Problems Maternal Grandfather     No Known Problems Paternal Grandmother     No Known Problems Paternal Grandfather     No Known Problems Maternal Aunt     No Known Problems Maternal Uncle     No Known Problems Paternal Aunt     No Known Problems Paternal Uncle     Colon cancer Neg Hx     Amblyopia Neg Hx     Blindness Neg Hx     Cataracts Neg Hx     Glaucoma Neg Hx     Macular degeneration Neg Hx     Retinal detachment Neg Hx     Strabismus Neg Hx     Stroke Neg Hx     Thyroid disease Neg Hx        ALLERGIES AND MEDICATIONS: updated and reviewed.  Review of patient's allergies indicates:   Allergen Reactions    Flecainide Other (See Comments)     Other reaction(s): worsening arrythmia     Medication List with Changes/Refills   Current Medications    AMLODIPINE (NORVASC) 2.5 MG TABLET    Take 2 tablets (5 mg total) by mouth once daily.    ATORVASTATIN (LIPITOR) 40 MG TABLET    Take 1 tablet (40 mg total) by mouth once daily.    DRONEDARONE (MULTAQ) 400 MG TAB    TAKE 1 TABLET (400 MG TOTAL) BY MOUTH 2 (TWO) TIMES DAILY WITH MEALS.     GABAPENTIN (NEURONTIN) 100 MG CAPSULE    Take 100 mg by mouth 3 (three) times daily.    HYDRALAZINE (APRESOLINE) 10 MG TABLET    Take 10 mg by mouth 2 (two) times daily.    RIVAROXABAN (XARELTO) 20 MG TAB    Take 1 tablet (20 mg total) by mouth every evening.   Changed and/or Refilled Medications    Modified Medication Previous Medication    ALPRAZOLAM (XANAX) 0.25 MG TABLET ALPRAZolam (XANAX) 0.25 MG tablet       TAKE 1 TABLET BY MOUTH AT BEDTIME AS NEEDED FOR ANXIETRY    TAKE 1 TABLET BY MOUTH AT BEDTIME AS NEEDED FOR ANXIETRY          CARE TEAM:  Patient Care Team:  Vickie Hurtado MD as PCP - General (Internal Medicine)  Vickie Hurtado MD as PCP - [facility] Attributed  Helga Mcmullen LPN as Licensed Practical Nurse  Jose Eduardo Stanley MD as Consulting Physician (Electrophysiology)           SCREENING HISTORY:  Health Maintenance       Date Due Completion Date    Shingles Vaccine (1 of 2) 03/24/1996 ---    Colorectal Cancer Screening 06/19/2019 6/18/2019    Override on 3/10/2015: Declined    Mammogram 06/22/2020 6/22/2018    Influenza Vaccine (1) 09/01/2020 9/30/2019    DEXA SCAN 06/24/2021 6/24/2019    Override on 7/7/2011: Done    Hemoglobin A1c 07/08/2021 7/8/2020    Lipid Panel 07/13/2021 7/13/2020    High Dose Statin 07/15/2021 7/15/2020    TETANUS VACCINE 06/06/2026 6/6/2016            REVIEW OF SYSTEMS:   The patient reports: good dietary habits.  The patient reports : that they do not exercise, but stay active.  Review of Systems   Constitutional: Positive for activity change (- not as active during this Covid 19 pandemic). Negative for chills, fatigue, fever and unexpected weight change.   HENT: Negative for congestion, hearing loss, postnasal drip, rhinorrhea and trouble swallowing.         She has a constant burning sensation in her mouth.  This is being followed by Neurology.  They started her on gabapentin 100 mg 3 times a day but she does not feel it very helpful.   Eyes: Negative for pain,  "discharge and visual disturbance.   Respiratory: Negative for cough, chest tightness, shortness of breath and wheezing.    Cardiovascular: Negative for chest pain, palpitations and leg swelling.   Gastrointestinal: Negative for abdominal pain, blood in stool, constipation, diarrhea, nausea and vomiting.   Endocrine: Positive for polyuria (- recently treated for UTI). Negative for polydipsia.   Genitourinary: Negative for difficulty urinating, dysuria, hematuria and menstrual problem.   Musculoskeletal: Negative for arthralgias, back pain, joint swelling and neck pain.   Skin: Negative for rash.   Neurological: Negative for weakness and headaches.   Psychiatric/Behavioral: Positive for dysphoric mood. Negative for confusion and sleep disturbance. The patient is nervous/anxious.       ROS (Optional)-: no pelvic pain but she feels a 'bulge coming out of me'  Breast ROS (Optional)-: negative for breast lumps/discharge            Physical Examination:   Vitals:    07/15/20 1141   BP: 123/67   Pulse:    Temp:      Weight: 84.2 kg (185 lb 10 oz)   Height: 5' 5" (165.1 cm)   Body mass index is 30.89 kg/m².      Patient did not require to have a chaperone present during the exam today.    General appearance - alert, well appearing, and in no distress, obese  Psychiatric - alert, oriented to person, place, and time, normal behavior, speech, dress, motor activity, mildly anxious  Eyes - pupils equal and reactive, extraocular eye movements intact, sclera anicteric  Mouth - not examined; patient wearing mask due to Covid 19 pandemic  Neck - supple, no significant adenopathy, carotids upstroke normal bilaterally, no bruits, thyroid exam: thyroid is normal in size without nodules or tenderness  Lymphatics - no palpable cervical lymphadenopathy  Chest - clear to auscultation, no wheezes, rales or rhonchi, symmetric air entry  Heart - normal rate and regular rhythm, no gallops noted  Abdomen - not examined  Pelvic - not " examined  Back exam - full range of motion, no tenderness, palpable spasm or pain on motion, in depth exam deferred  Neurological - alert, normal speech, no focal findings or movement disorder noted, cranial nerves II through XII intact  Musculoskeletal - patient noted to have Mild-Moderate osteoarthritic changes to both knee joints. No joint effusions noted., no muscular tenderness noted  Extremities - peripheral pulses normal, no pedal edema, no clubbing or cyanosis  Skin - normal coloration and turgor, no rashes, no suspicious skin lesions noted      Labs:  Results for orders placed or performed in visit on 07/13/20   CBC auto differential   Result Value Ref Range    WBC 7.05 3.90 - 12.70 K/uL    RBC 4.38 4.00 - 5.40 M/uL    Hemoglobin 13.4 12.0 - 16.0 g/dL    Hematocrit 43.9 37.0 - 48.5 %    Mean Corpuscular Volume 100 (H) 82 - 98 fL    Mean Corpuscular Hemoglobin 30.6 27.0 - 31.0 pg    Mean Corpuscular Hemoglobin Conc 30.5 (L) 32.0 - 36.0 g/dL    RDW 12.3 11.5 - 14.5 %    Platelets 233 150 - 350 K/uL    MPV 10.7 9.2 - 12.9 fL    Immature Granulocytes 0.1 0.0 - 0.5 %    Gran # (ANC) 3.7 1.8 - 7.7 K/uL    Immature Grans (Abs) 0.01 0.00 - 0.04 K/uL    Lymph # 2.2 1.0 - 4.8 K/uL    Mono # 0.6 0.3 - 1.0 K/uL    Eos # 0.5 0.0 - 0.5 K/uL    Baso # 0.04 0.00 - 0.20 K/uL    nRBC 0 0 /100 WBC    Gran% 52.3 38.0 - 73.0 %    Lymph% 31.1 18.0 - 48.0 %    Mono% 8.2 4.0 - 15.0 %    Eosinophil% 7.7 0.0 - 8.0 %    Basophil% 0.6 0.0 - 1.9 %    Differential Method Automated    Comprehensive metabolic panel   Result Value Ref Range    Sodium 139 136 - 145 mmol/L    Potassium 4.8 3.5 - 5.1 mmol/L    Chloride 108 95 - 110 mmol/L    CO2 24 23 - 29 mmol/L    Glucose 109 70 - 110 mg/dL    BUN, Bld 22 8 - 23 mg/dL    Creatinine 0.8 0.5 - 1.4 mg/dL    Calcium 8.8 8.7 - 10.5 mg/dL    Total Protein 6.9 6.0 - 8.4 g/dL    Albumin 3.6 3.5 - 5.2 g/dL    Total Bilirubin 0.4 0.1 - 1.0 mg/dL    Alkaline Phosphatase 56 55 - 135 U/L    AST 26 10 -  40 U/L    ALT 17 10 - 44 U/L    Anion Gap 7 (L) 8 - 16 mmol/L    eGFR if African American >60.0 >60 mL/min/1.73 m^2    eGFR if non African American >60.0 >60 mL/min/1.73 m^2   Lipid Panel   Result Value Ref Range    Cholesterol 127 120 - 199 mg/dL    Triglycerides 64 30 - 150 mg/dL    HDL 46 40 - 75 mg/dL    LDL Cholesterol 68.2 63.0 - 159.0 mg/dL    Hdl/Cholesterol Ratio 36.2 20.0 - 50.0 %    Total Cholesterol/HDL Ratio 2.8 2.0 - 5.0    Non-HDL Cholesterol 81 mg/dL          ASSESSMENT AND PLAN:  1. Routine medical exam  Counseled on age appropriate medical preventative services including age appropriate cancer screenings, age appropriate eye and dental exams, over all nutritional health, need for a consistent exercise regimen, and an over all push towards maintaining a vigorous and active lifestyle.  Counseled on age appropriate vaccines and discussed upcoming health care needs based on age/gender. Discussed good sleep hygiene and stress management.  - Cologuard Screening (Multitarget Stool DNA); Future  - Cologuard Screening (Multitarget Stool DNA)    2. Essential hypertension/3. Atrial fibrillation, unspecified type  The current medical regimen is effective;  continue present plan and medications.   Followed by: Cardiology.     4. Borderline hyperlipidemia  We discussed low fat diet and regular exercise.The current medical regimen is effective;  continue present plan and medications.     5. Prediabetes  Stable. We discussed low sugar/low carbohydrate diet and regular exercise to prevent progression. No need for prescription medication at this time.    6. Osteopenia after menopause  We discussed adequate calcium and vitamin D supplementation. We discussed fall precautions. She is up to date on her BMD. No need for prescription medication at this time    7. History of stroke  Stable/asymptomatic.  We discussed risk factor reduction.    8. Overweight (BMI 25.0-29.9)  The patient is asked to make an attempt to  improve diet and exercise patterns to aid in medical management of this problem.    9. Anxiety  Stable. Medication as needed.    - ALPRAZolam (XANAX) 0.25 MG tablet; TAKE 1 TABLET BY MOUTH AT BEDTIME AS NEEDED FOR ANXIETRY  Dispense: 30 tablet; Refill: 0    10. Encounter for screening mammogram for breast cancer  Patient counseled that this can be an addicting medication.  Patient was counseled that a recent study showed that the chronic use of anxiolytic medication may increase the risk for developing dementia.  Patient was warned of the sedating properties of this medication and was advised to abstain from driving, operating heavy machinery, etc if they feel drowsy.  - Mammo Digital Screening Bilat w/ Norbert; Future    11. Colon cancer screening      12. Need for shingles vaccine      - Zoster Recombinant Vaccine    13. Advanced directives, counseling/discussion  Advance Care Planning   I initiated the process and explained the importance of advance care planning today with the patient.  Advanced directives were discussed due to patient's age and/or chronic medical conditions. Prognosis based on current condition: good.   Paperwork was given to complete living will (at this point in time, the patient does have full decision-making capacity.  We discussed different extreme health states that she could experience, and reviewed what kind of medical care she would want in those situations) and medical POA (The patient received detailed information about the importance of designating a Health Care Power of . She was also instructed to communicate with this person about their wishes for future healthcare, should she become sick and lose decision-making capacity).   [place] was not discussed.   Approximately 2 minute(s) were spent on counseling/discussion regarding end of life decision making.           14. Prolapsed uterus  Based on her description I suspect a prolapsed uterus.  I will refer her to gyn further  evaluation.  - Ambulatory referral/consult to Obstetrics / Gynecology; Future    15. Burning mouth syndrome  The current medical regimen is not effective;  She will contact neurology for further treatment suggestions.   Followed by: Neurology.       The patient works as a teacher.  She states that because of her health condition she is scared to go back to teaching because of the fear of pedro COVID-19.  She had requested me to fill out paperwork for leave of absence.  I advised the patient that paperwork is only indicated if she currently has and illness for which she may not return to work.  She is trying to avoid illness.  I do agree with her that she should probably not go back to work, but this does not fall under the paperwork that she is requesting to be completed.  Instead, I wrote for her a letter listing her health conditions and she can further discuss with her human resources department to see how to proceed.      Follow up in about 1 year (around 7/15/2021), or if symptoms worsen or fail to improve, for annual exam. or sooner as needed.

## 2020-07-15 NOTE — LETTER
July 15, 2020    Re: Gale Fernández  [address]  [place] [address]             [place] [address] [facility] [address]  Phone: [phone]  Fax: [phone] To Whom It May Concern:    Gale Fernández is a patient of mine at the [facility].   She has the following chronic medical conditions:  Hypertension  Hyperlipidemia  Paroxysmal atrial fibrillation  Prediabetes  Anxiety  Burning mouth syndrome    Based on age and health conditions she is considered to be in the high risk category for COVID-19 infection.      If you have any questions or concerns, please don't hesitate to call.    Sincerely,         Vickie Hurtado MD

## 2020-07-21 ENCOUNTER — TELEPHONE (OUTPATIENT)
Dept: FAMILY MEDICINE | Facility: CLINIC | Age: 75
End: 2020-07-21

## 2020-07-21 NOTE — TELEPHONE ENCOUNTER
----- Message from Chelsea Brand sent at 7/21/2020  3:39 PM CDT -----  Regarding: Patient Call Back  Contact: Patient  Type: Patient Call Back    Who called: Patient     What is the request in detail: Pt is calling to give an e-mail and fax number for her Human Resource department [email or web address]  fax [phone]    Can the clinic reply by [facility]?    Would the patient rather a call back or a response via [facility]? Call back     Best call back number: [phone]

## 2020-07-30 LAB — HEMOCCULT STL QL IA: POSITIVE

## 2020-08-10 ENCOUNTER — TELEPHONE (OUTPATIENT)
Dept: FAMILY MEDICINE | Facility: CLINIC | Age: 75
End: 2020-08-10

## 2020-08-10 ENCOUNTER — PATIENT OUTREACH (OUTPATIENT)
Dept: ADMINISTRATIVE | Facility: HOSPITAL | Age: 75
End: 2020-08-10

## 2020-08-10 DIAGNOSIS — Z12.11 COLON CANCER SCREENING: Primary | ICD-10-CM

## 2020-08-10 NOTE — TELEPHONE ENCOUNTER
The patient and advised her that her Cologuard test came back positive.  She will require a colonoscopy for further evaluation.  She voiced understanding.  Order placed for patient to have colonoscopy at [facility] per her request.

## 2020-08-11 ENCOUNTER — OFFICE VISIT (OUTPATIENT)
Dept: ELECTROPHYSIOLOGY | Facility: CLINIC | Age: 75
End: 2020-08-11
Payer: COMMERCIAL

## 2020-08-11 ENCOUNTER — TELEPHONE (OUTPATIENT)
Dept: ELECTROPHYSIOLOGY | Facility: CLINIC | Age: 75
End: 2020-08-11

## 2020-08-11 ENCOUNTER — HOSPITAL ENCOUNTER (OUTPATIENT)
Dept: CARDIOLOGY | Facility: CLINIC | Age: 75
Discharge: HOME | End: 2020-08-11
Payer: COMMERCIAL

## 2020-08-11 VITALS
HEART RATE: 109 BPM | HEIGHT: 65 IN | WEIGHT: 182.13 LBS | DIASTOLIC BLOOD PRESSURE: 64 MMHG | SYSTOLIC BLOOD PRESSURE: 124 MMHG | BODY MASS INDEX: 30.34 KG/M2

## 2020-08-11 DIAGNOSIS — Z86.73 HISTORY OF STROKE: ICD-10-CM

## 2020-08-11 DIAGNOSIS — I48.91 ATRIAL FIBRILLATION, UNSPECIFIED TYPE: ICD-10-CM

## 2020-08-11 DIAGNOSIS — I48.0 PAROXYSMAL ATRIAL FIBRILLATION: Primary | ICD-10-CM

## 2020-08-11 DIAGNOSIS — I47.10 SVT (SUPRAVENTRICULAR TACHYCARDIA): ICD-10-CM

## 2020-08-11 DIAGNOSIS — Z79.01 CURRENT USE OF LONG TERM ANTICOAGULATION: ICD-10-CM

## 2020-08-11 DIAGNOSIS — I10 ESSENTIAL HYPERTENSION: ICD-10-CM

## 2020-08-11 PROCEDURE — 99214 PR OFFICE/OUTPT VISIT, EST, LEVL IV, 30-39 MIN: ICD-10-PCS | Mod: S$PBB,,, | Performed by: NURSE PRACTITIONER

## 2020-08-11 PROCEDURE — 93005 RHYTHM STRIP: ICD-10-PCS | Mod: S$GLB,,, | Performed by: INTERNAL MEDICINE

## 2020-08-11 PROCEDURE — 99999 PR PBB SHADOW E&M-EST. PATIENT-LVL III: ICD-10-PCS | Mod: PBBFAC,,, | Performed by: NURSE PRACTITIONER

## 2020-08-11 PROCEDURE — 93005 ELECTROCARDIOGRAM TRACING: CPT | Mod: S$GLB,,, | Performed by: INTERNAL MEDICINE

## 2020-08-11 PROCEDURE — 99999 PR PBB SHADOW E&M-EST. PATIENT-LVL III: CPT | Mod: PBBFAC,,, | Performed by: NURSE PRACTITIONER

## 2020-08-11 PROCEDURE — 93010 ELECTROCARDIOGRAM REPORT: CPT | Mod: S$GLB,,, | Performed by: INTERNAL MEDICINE

## 2020-08-11 PROCEDURE — 93010 RHYTHM STRIP: ICD-10-PCS | Mod: S$GLB,,, | Performed by: INTERNAL MEDICINE

## 2020-08-11 PROCEDURE — 99214 OFFICE O/P EST MOD 30 MIN: CPT | Mod: S$PBB,,, | Performed by: NURSE PRACTITIONER

## 2020-08-11 NOTE — PROGRESS NOTES
"Ms. Fernández is a patient of Dr. Stanley and was last seen in clinic 1/3/2020.      Subjective:   Patient ID:  Gale Fernández is a 75 y.o. female who presents for follow-up of Atrial Fibrillation  .     HPI:    Ms. Fernández is a 75 y.o. female with pAF, AVNRT (s/p slow pathway modification 2004), osteopenia, HTN, TIA, and borderline HLD here for follow up.      Background:     Ms. 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 AT seen at the end of the procedure but it resolved once isoproterenol out of patient's system and AT was not ablated.  Ms. Fernández ultimately developed recurrent palpitations, and an Event Monitor at the time revealed SVT at 130 bpm.   Following this, she presented to clinic and was found to be in AF w/a controlled ventricular response. Ms. Fernández has a hx of noncompliance with her medical regimen; has changed medications and dosages on her own. She reportedly experienced fatigue and "low blood pressure" on flecainide.   At her office visit in September of 2015, Ms. Fernández agreed to resume dronedarone and Xarelto was initiated; she denied experiencing AF recurrence.  She did however, note back pain with radiation into her BLE and wondered whether one of her medications was contributing to her symptoms.  At her office visit 7/2016, Ms. Fernández reported feeling well overall with occasional baseline fatigue. She reported experiencing only 2 AF episodes during the preceding year; both of which lasted just several hours.   At her office visit 8/2017 she reported only occasional palpitations and some minor sensory complaints.  At office visit 8/2018 she reported palpitations and had stopped her Multaq. She agreed to restart this.   At clinic visit 6/26/2019, she was in AF. She said it was paroxysmal. Holter and echo ordered, but only echo completed. Was back in SR by follow up clinic appt.  9/19/2019 diagnosed with TIA. Symptoms included numb arm, aphasia, and double vision. " Symptoms have resolved, although her tongue felt somewhat numb afterward. Had not been taking xarelto with dinner. She declined switching OAC and elected instead to start taking xarelto with dinner.    Update (08/11/2020):    Today she says she had been feeling great, but just found out her Cologuard test screened positive and she is very stressed about this. She wants to schedule her colonoscopy as soon as possible, but needs clearance from EP to proceed. She says this morning she noticed that she went back into AF. Prior to this she had been feeling great, denying CP, [place], LH, syncope.    She is currently taking xarelto 20mg daily for stroke prophylaxis and denies significant bleeding episodes. She is currently being treated with multaq 400mg for rhythm control. Kidney function is stable, with a creatinine of 0.8 on 7/13/2020.    I have personally reviewed the patient's EKG today, which shows AF at 107bpm. QRS is 92. QTc is 452.    Recent Cardiac Tests:    2D Echo (9/19/2019):  · Concentric left ventricular remodeling.  · Normal left ventricular systolic function. The estimated ejection fraction is 60%  · Normal right ventricular systolic function.  · No wall motion abnormalities.  · The estimated PA systolic pressure is 23 mm Hg  · Normal central venous pressure (3 mm Hg).  · Atrial fibrillation observed.    Current Outpatient Medications   Medication Sig    ALPRAZolam (XANAX) 0.25 MG tablet TAKE 1 TABLET BY MOUTH AT BEDTIME AS NEEDED FOR ANXIETRY    amLODIPine (NORVASC) 2.5 MG tablet Take 2 tablets (5 mg total) by mouth once daily.    atorvastatin (LIPITOR) 40 MG tablet Take 1 tablet (40 mg total) by mouth once daily. (Patient taking differently: Take 20 mg by mouth once daily. )    dronedarone (MULTAQ) 400 mg Tab TAKE 1 TABLET (400 MG TOTAL) BY MOUTH 2 (TWO) TIMES DAILY WITH MEALS.    gabapentin (NEURONTIN) 100 MG capsule Take 100 mg by mouth 3 (three) times daily.    hydrALAZINE (APRESOLINE) 10 MG tablet  "Take 10 mg by mouth 2 (two) times daily.    rivaroxaban (XARELTO) 20 mg Tab Take 1 tablet (20 mg total) by mouth every evening.     No current facility-administered medications for this visit.        Review of Systems   Constitution: Negative for malaise/fatigue.   Cardiovascular: Positive for irregular heartbeat. Negative for chest pain, dyspnea on exertion, leg swelling and palpitations.   Respiratory: Negative for shortness of breath.    Hematologic/Lymphatic: Negative for bleeding problem.   Skin: Negative for rash.   Musculoskeletal: Negative for myalgias.   Gastrointestinal: Negative for hematemesis, hematochezia and nausea.   Genitourinary: Negative for hematuria.   Neurological: Negative for light-headedness.   Psychiatric/Behavioral: Negative for altered mental status. The patient is nervous/anxious.    Allergic/Immunologic: Negative for persistent infections.         Objective:          /64   Pulse 109   Ht 5' 5" (1.651 m)   Wt 82.6 kg (182 lb 1.6 oz)   LMP 07/24/2012   BMI 30.30 kg/m²     Physical Exam   Constitutional: She is oriented to person, place, and time. She appears well-developed and well-nourished.   HENT:   Head: Normocephalic.   Nose: Nose normal.   Eyes: Pupils are equal, round, and reactive to light.   Cardiovascular: S1 normal and S2 normal. An irregularly irregular rhythm present. Tachycardia present.   No murmur heard.  Pulses:       Radial pulses are 2+ on the right side and 2+ on the left side.   Pulmonary/Chest: Breath sounds normal. No respiratory distress.   Abdominal: Normal appearance.   Musculoskeletal: Normal range of motion.         General: No edema.   Neurological: She is alert and oriented to person, place, and time.   Skin: Skin is warm and dry. No erythema.   Psychiatric: She has a normal mood and affect. Her speech is normal and behavior is normal.   Nursing note and vitals reviewed.    Lab Results   Component Value Date     07/13/2020    K 4.8 " 07/13/2020    MG 1.9 09/18/2019    BUN 22 07/13/2020    CREATININE 0.8 07/13/2020    ALT 17 07/13/2020    AST 26 07/13/2020    HGB 13.4 07/13/2020    HCT 43.9 07/13/2020    TSH 0.917 09/18/2019    LDLCALC 68.2 07/13/2020       Recent Labs   Lab 09/18/19  2354   INR 1.2       Assessment:     1. Paroxysmal atrial fibrillation    2. Essential hypertension    3. SVT (supraventricular tachycardia)    4. Current use of long term anticoagulation    5. History of stroke      Plan:     In summary, Ms. Fernández is a 75 y.o. female with pAF, AVNRT (s/p slow pathway modification 2004), osteopenia, HTN, TIA, and borderline HLD here for follow up.  She is in AF today. She has a histroy of paroxysmal AF and recent infrequent episodes have typically lasted a few hours at most. She says her latest episode only started today, and she thinks it is likely stress-induced due to her positive GI screening test. There are no contraindications to proceeding with colonoscopy from an arrhythmia standpoint. We discussed that if she remains in persistent AF, she will not be able to hold her xarelto for 30 days after a cardioversion, so should proceed with colonoscopy first. Will obtain Holter afterward to confirm that her AF has resolved. If persistent, will proceed with cardioversion after colonoscopy.  She can hold xarelto 2 days prior to procedure and restart at surgeon's discretion.      Schedule 48 hr Holter.   Continue current medications.  RTC depending on test results.    *A copy of this note has been sent to Dr. Stanley*        ------------------------------------------------------------------    HALEY Epstein, NP-C  Cardiac Electrophysiology

## 2020-08-11 NOTE — TELEPHONE ENCOUNTER
----- Message from Trish Martinez sent at 8/11/2020  9:49 AM CDT -----  Regarding: Clearance  Pt is having colonoscopy but needs clearance before scheduling. Thanks     [phone]

## 2020-08-11 NOTE — TELEPHONE ENCOUNTER
Spoke with patient and advised that she can hold Xarelto for 2 days prior to procedure and may resume at MD's discretion. She verbalized understanding.

## 2020-08-11 NOTE — TELEPHONE ENCOUNTER
Patient is trying to schedule her colonoscopy but they will not until she follows up with [facility]. She was supposed to follow up in April. Can you please call to schedule follow up as soon as possible so that she can get her colonoscopy scheduled?  thanks

## 2020-08-12 DIAGNOSIS — Z12.11 SPECIAL SCREENING FOR MALIGNANT NEOPLASMS, COLON: Primary | ICD-10-CM

## 2020-08-12 DIAGNOSIS — Z01.812 PRE-PROCEDURE LAB EXAM: ICD-10-CM

## 2020-08-12 RX ORDER — POLYETHYLENE GLYCOL 3350, SODIUM SULFATE ANHYDROUS, SODIUM BICARBONATE, SODIUM CHLORIDE, POTASSIUM CHLORIDE 236; 22.74; 6.74; 5.86; 2.97 G/4L; G/4L; G/4L; G/4L; G/4L
4 POWDER, FOR SOLUTION ORAL ONCE
Qty: 4000 ML | Refills: 0 | Status: SHIPPED | OUTPATIENT
Start: 2020-08-12 | End: 2020-08-12

## 2020-08-18 ENCOUNTER — LAB VISIT (OUTPATIENT)
Dept: FAMILY MEDICINE | Facility: CLINIC | Age: 75
End: 2020-08-18
Payer: COMMERCIAL

## 2020-08-18 DIAGNOSIS — Z01.812 PRE-PROCEDURE LAB EXAM: ICD-10-CM

## 2020-08-18 PROCEDURE — U0003 INFECTIOUS AGENT DETECTION BY NUCLEIC ACID (DNA OR RNA); SEVERE ACUTE RESPIRATORY SYNDROME CORONAVIRUS 2 (SARS-COV-2) (CORONAVIRUS DISEASE [COVID-19]), AMPLIFIED PROBE TECHNIQUE, MAKING USE OF HIGH THROUGHPUT TECHNOLOGIES AS DESCRIBED BY CMS-2020-01-R: HCPCS

## 2020-08-19 LAB — SARS-COV-2 RNA RESP QL NAA+PROBE: NOT DETECTED

## 2020-08-21 ENCOUNTER — ANESTHESIA EVENT (OUTPATIENT)
Dept: ENDOSCOPY | Facility: HOSPITAL | Age: 75
End: 2020-08-21
Payer: COMMERCIAL

## 2020-08-21 ENCOUNTER — HOSPITAL ENCOUNTER (OUTPATIENT)
Facility: HOSPITAL | Age: 75
Discharge: HOME | End: 2020-08-21
Attending: INTERNAL MEDICINE | Admitting: INTERNAL MEDICINE
Payer: COMMERCIAL

## 2020-08-21 ENCOUNTER — ANESTHESIA (OUTPATIENT)
Dept: ENDOSCOPY | Facility: HOSPITAL | Age: 75
End: 2020-08-21
Payer: COMMERCIAL

## 2020-08-21 VITALS
OXYGEN SATURATION: 99 % | TEMPERATURE: 98 F | SYSTOLIC BLOOD PRESSURE: 142 MMHG | RESPIRATION RATE: 16 BRPM | HEIGHT: 65 IN | WEIGHT: 180 LBS | BODY MASS INDEX: 29.99 KG/M2 | DIASTOLIC BLOOD PRESSURE: 68 MMHG | HEART RATE: 59 BPM

## 2020-08-21 DIAGNOSIS — Z12.11 SPECIAL SCREENING FOR MALIGNANT NEOPLASMS, COLON: Primary | ICD-10-CM

## 2020-08-21 DIAGNOSIS — Z12.11 COLON CANCER SCREENING: ICD-10-CM

## 2020-08-21 PROCEDURE — 88305 TISSUE EXAM BY PATHOLOGIST: ICD-10-PCS | Mod: 26,,, | Performed by: PATHOLOGY

## 2020-08-21 PROCEDURE — 27201012 HC FORCEPS, HOT/COLD, DISP: Performed by: INTERNAL MEDICINE

## 2020-08-21 PROCEDURE — 88305 TISSUE EXAM BY PATHOLOGIST: CPT | Performed by: PATHOLOGY

## 2020-08-21 PROCEDURE — E9220 PRA ENDO ANESTHESIA: HCPCS | Mod: 33,,, | Performed by: NURSE ANESTHETIST, CERTIFIED REGISTERED

## 2020-08-21 PROCEDURE — 25000003 PHARM REV CODE 250: Performed by: INTERNAL MEDICINE

## 2020-08-21 PROCEDURE — 45380 PR COLONOSCOPY,BIOPSY: ICD-10-PCS | Mod: 33,,, | Performed by: INTERNAL MEDICINE

## 2020-08-21 PROCEDURE — E9220 PRA ENDO ANESTHESIA: ICD-10-PCS | Mod: 33,,, | Performed by: NURSE ANESTHETIST, CERTIFIED REGISTERED

## 2020-08-21 PROCEDURE — 45380 COLONOSCOPY AND BIOPSY: CPT | Mod: 33,,, | Performed by: INTERNAL MEDICINE

## 2020-08-21 PROCEDURE — 37000009 HC ANESTHESIA EA ADD 15 MINS: Performed by: INTERNAL MEDICINE

## 2020-08-21 PROCEDURE — 37000008 HC ANESTHESIA 1ST 15 MINUTES: Performed by: INTERNAL MEDICINE

## 2020-08-21 PROCEDURE — 63600175 PHARM REV CODE 636 W HCPCS: Performed by: NURSE ANESTHETIST, CERTIFIED REGISTERED

## 2020-08-21 PROCEDURE — 88305 TISSUE EXAM BY PATHOLOGIST: CPT | Mod: 26,,, | Performed by: PATHOLOGY

## 2020-08-21 PROCEDURE — 45380 COLONOSCOPY AND BIOPSY: CPT | Performed by: INTERNAL MEDICINE

## 2020-08-21 RX ORDER — SODIUM CHLORIDE 9 MG/ML
INJECTION, SOLUTION INTRAVENOUS CONTINUOUS
Status: DISCONTINUED | OUTPATIENT
Start: 2020-08-21 | End: 2020-08-21 | Stop reason: HOSPADM

## 2020-08-21 RX ORDER — PROPOFOL 10 MG/ML
VIAL (ML) INTRAVENOUS
Status: DISCONTINUED | OUTPATIENT
Start: 2020-08-21 | End: 2020-08-21

## 2020-08-21 RX ORDER — PROPOFOL 10 MG/ML
VIAL (ML) INTRAVENOUS CONTINUOUS PRN
Status: DISCONTINUED | OUTPATIENT
Start: 2020-08-21 | End: 2020-08-21

## 2020-08-21 RX ORDER — LIDOCAINE HYDROCHLORIDE 20 MG/ML
INJECTION INTRAVENOUS
Status: DISCONTINUED | OUTPATIENT
Start: 2020-08-21 | End: 2020-08-21

## 2020-08-21 RX ADMIN — SODIUM CHLORIDE: 0.9 INJECTION, SOLUTION INTRAVENOUS at 12:08

## 2020-08-21 RX ADMIN — PROPOFOL 200 MCG/KG/MIN: 10 INJECTION, EMULSION INTRAVENOUS at 01:08

## 2020-08-21 RX ADMIN — LIDOCAINE HYDROCHLORIDE 80 MG: 20 INJECTION, SOLUTION INTRAVENOUS at 01:08

## 2020-08-21 RX ADMIN — PROPOFOL 80 MG: 10 INJECTION, EMULSION INTRAVENOUS at 01:08

## 2020-08-21 RX ADMIN — PROPOFOL 20 MG: 10 INJECTION, EMULSION INTRAVENOUS at 01:08

## 2020-08-21 NOTE — TRANSFER OF CARE
"Anesthesia Transfer of Care Note    Patient: Gale Fernández    Procedure(s) Performed: Procedure(s) (LRB):  COLONOSCOPY (N/A)    Patient location: PACU    Anesthesia Type: general    Transport from OR: Transported from OR on 100% O2 by closed face mask with adequate spontaneous ventilation    Post pain: adequate analgesia    Post assessment: no apparent anesthetic complications and tolerated procedure well    Post vital signs: stable    Level of consciousness: responds to stimulation and sedated    Nausea/Vomiting: no nausea/vomiting    Complications: none    Transfer of care protocol was followed      Last vitals:   Visit Vitals  /65   Pulse 75   Temp 36.5 °C (97.7 °F)   Resp 16   Ht 5' 5" (1.651 m)   Wt 81.6 kg (180 lb)   LMP 07/24/2012   SpO2 98%   Breastfeeding No   BMI 29.95 kg/m²     "

## 2020-08-21 NOTE — PLAN OF CARE
Pt given both written and verbal discharge orders, pt verbalized understanding and had no further questions.

## 2020-08-21 NOTE — H&P
Short Stay Endoscopy History and Physical    PCP - Vickie Hurtado MD    Procedure - Colonoscopy  ASA - per anesthesia  Mallampati - per anesthesia  History of Anesthesia problems - no  Family history Anesthesia problems - no   Plan of anesthesia - General    HPI:  This is a 75 y.o. female here for evaluation of : asymptomatic screening exam      ROS:  Constitutional: No fevers, chills, No weight loss  CV: No chest pain  Pulm: No cough, No shortness of breath  GI: see HPI  Derm: No rash    Medical History:  has a past medical history of Atrial fibrillation, Borderline hyperlipidemia, Cataract, History of abnormal Pap smear, History of anxiety disorder, History of hypertension, History of ventricular tachycardia, Hypertension, Osteopenia, and Overweight(278.02).    Surgical History:  has a past surgical history that includes PSVT ablation;  section, low transverse; Skin cancer removal on the left eye; left knee meniscus surgery (); and neck tuck and liposuction ().    Family History: family history includes Breast cancer in her cousin; Cancer in her brother, cousin, father, and sister; Coronary artery disease in her brother and brother; Diabetes in her sister; Heart disease in her brother and mother; Hypertension in her brother, brother, brother, brother, and sister; No Known Problems in her maternal aunt, maternal grandfather, maternal grandmother, maternal uncle, paternal aunt, paternal grandfather, paternal grandmother, and paternal uncle; Rheum arthritis in her sister.. Otherwise no colon cancer, inflammatory bowel disease, or GI malignancies.    Social History:  reports that she has quit smoking. Her smoking use included cigarettes. She has never used smokeless tobacco. She reports current alcohol use. She reports that she does not use drugs.    Review of patient's allergies indicates:   Allergen Reactions    Flecainide Other (See Comments)     Other reaction(s): worsening arrythmia        Medications:   Medications Prior to Admission   Medication Sig Dispense Refill Last Dose    amLODIPine (NORVASC) 2.5 MG tablet Take 2 tablets (5 mg total) by mouth once daily. 90 tablet 3 8/21/2020 at Unknown time    atorvastatin (LIPITOR) 40 MG tablet Take 1 tablet (40 mg total) by mouth once daily. (Patient taking differently: Take 20 mg by mouth once daily. ) 90 tablet 3 8/20/2020 at Unknown time    dronedarone (MULTAQ) 400 mg Tab TAKE 1 TABLET (400 MG TOTAL) BY MOUTH 2 (TWO) TIMES DAILY WITH MEALS. 60 tablet 5 8/21/2020 at Unknown time    gabapentin (NEURONTIN) 100 MG capsule Take 100 mg by mouth 3 (three) times daily.   Past Month at Unknown time    ALPRAZolam (XANAX) 0.25 MG tablet TAKE 1 TABLET BY MOUTH AT BEDTIME AS NEEDED FOR ANXIETRY 30 tablet 0 More than a month at Unknown time    hydrALAZINE (APRESOLINE) 10 MG tablet Take 10 mg by mouth 2 (two) times daily.   More than a month at Unknown time    rivaroxaban (XARELTO) 20 mg Tab Take 1 tablet (20 mg total) by mouth every evening. 30 tablet 11 8/18/2020         Physical Exam:    Vital Signs:   Vitals:    08/21/20 1259   BP: 139/67   Pulse: 83   Resp: 16   Temp: 97.7 °F (36.5 °C)       General Appearance: Well appearing in no acute distress  Eyes:    No scleral icterus  ENT: Neck supple, Lips, mucosa, and tongue normal; teeth and gums normal  Lungs: CTA bilaterally  Heart:  S1, S2 normal, no murmurs heard  Abdomen: Soft, non tender, non distended with positive bowel sounds. No hepatosplenomegaly, ascites, or mass.  Extremities: 2+ pulses, no clubbing, cyanosis or edema  Skin: No rash      Labs:  Lab Results   Component Value Date    WBC 7.05 07/13/2020    HGB 13.4 07/13/2020    HCT 43.9 07/13/2020     07/13/2020    CHOL 127 07/13/2020    TRIG 64 07/13/2020    HDL 46 07/13/2020    ALT 17 07/13/2020    AST 26 07/13/2020     07/13/2020    K 4.8 07/13/2020     07/13/2020    CREATININE 0.8 07/13/2020    BUN 22 07/13/2020    CO2 24  07/13/2020    TSH 0.917 09/18/2019    INR 1.2 09/18/2019    HGBA1C 5.6 07/08/2020       I have explained the risks and benefits of endoscopy procedures to the patient including but not limited to bleeding, perforation, infection, and death.  The patient was asked if they understand and allowed to ask any further questions to their satisfaction.    Maicol Johnson MD

## 2020-08-21 NOTE — DISCHARGE INSTRUCTIONS
Colonoscopy     A camera attached to a flexible tube with a viewing lens is used to take video pictures.     Colonoscopy is a test to view the inside of your lower digestive tract (colon and rectum). Sometimes it can show the last part of the small intestine (ileum). During the test, small pieces of tissue may be removed for testing. This is called a biopsy. Small growths, such as polyps, may also be removed.   Why is colonoscopy done?  The test is done to help look for colon cancer. And it can help find the source of abdominal pain, bleeding, and changes in bowel habits. It may be needed once a year, depending on factors such as your:  · Age  · Health history  · Family health history  · Symptoms  · Results from any prior colonoscopy  Risks and possible complications  These include:  · Bleeding               · A puncture or tear in the colon   · Risks of anesthesia  · A cancer lesion not being seen  Getting ready   To prepare for the test:  · Talk with your healthcare provider about the risks of the test (see below). Also ask your healthcare provider about alternatives to the test.  · Tell your healthcare provider about any medicines you take. Also tell him or her about any health conditions you may have.  · Make sure your rectum and colon are empty for the test. Follow the diet and bowel prep instructions exactly. If you dont, the test may need to be rescheduled.  · Plan for a friend or family member to drive you home after the test.     Colonoscopy provides an inside view of the entire colon.     You may discuss the results with your doctor right away or at a future visit.  During the test   The test is usually done in the hospital on an outpatient basis. This means you go home the same day. The procedure takes about 30 minutes. During that time:  · You are given relaxing (sedating) medicine through an IV line. You may be drowsy, or fully asleep.  · The healthcare provider will first give you a physical exam to  check for anal and rectal problems.  · Then the anus is lubricated and the scope inserted.  · If you are awake, you may have a feeling similar to needing to have a bowel movement. You may also feel pressure as air is pumped into the colon. Its OK to pass gas during the procedure.  · Biopsy, polyp removal, or other treatments may be done during the test.  After the test   You may have gas right after the test. It can help to try to pass it to help prevent later bloating. Your healthcare provider may discuss the results with you right away. Or you may need to schedule a follow-up visit to talk about the results. After the test, you can go back to your normal eating and other activities. You may be tired from the sedation and need to rest for a few hours.  Date Last Reviewed: 11/1/2016 © 2000-2017 [employer]. [address] [place] [address], [place] [address]. All rights reserved. This information is not intended as a substitute for professional medical care. Always follow your healthcare professional's instructions.

## 2020-08-21 NOTE — ANESTHESIA PREPROCEDURE EVALUATION
2020  Gale Fernández is a 75 y.o., female.    Past Medical History:   Diagnosis Date    Atrial fibrillation     Followed by EP cardiology    Borderline hyperlipidemia     Cataract     History of abnormal Pap smear     More than 20 years ago; status post colposcopy    History of anxiety disorder     History of hypertension     Currently doing okay off medication    History of ventricular tachycardia     (PSVT) Status post ablation    Hypertension     Osteopenia     Refuses medication    Overweight(278.02)      Past Surgical History:   Procedure Laterality Date     SECTION, LOW TRANSVERSE      X1    left knee meniscus surgery      neck tuck and liposuction      PSVT ablation      Skin cancer removal on the left eye         Anesthesia Evaluation    I have reviewed the Patient Summary Reports.    I have reviewed the Nursing Notes. I have reviewed the NPO Status.   I have reviewed the Medications.     Review of Systems  Anesthesia Hx:  No problems with previous Anesthesia    Hematology/Oncology:  Hematology Normal   Oncology Normal     EENT/Dental:EENT/Dental Normal   Cardiovascular:   Hypertension Dysrhythmias atrial fibrillation    Pulmonary:  Pulmonary Normal    Renal/:  Renal/ Normal     Hepatic/GI:  Hepatic/GI Normal    Musculoskeletal:  Musculoskeletal Normal    Neurological:   TIA   Endocrine:  Endocrine Normal    Dermatological:  Skin Normal    Psych:   anxiety          Physical Exam  General:  Well nourished    Airway/Jaw/Neck:  Airway Findings: Mouth Opening: Normal Tongue: Normal  General Airway Assessment: Adult  Mallampati: III  Improves to II with phonation.  TM Distance: Normal, at least 6 cm  Jaw/Neck Findings:  Neck ROM: Normal ROM     Eyes/Ears/Nose:  EYES/EARS/NOSE FINDINGS: Normal   Dental:  Dental Findings: In tact   Chest/Lungs:  Chest/Lungs Findings:  Clear to auscultation, Normal Respiratory Rate     Heart/Vascular:  Heart Findings: Rate: Normal  Rhythm: Irregularly Irregular  Sounds: Normal  Heart murmur: negative Vascular Findings: Normal    Abdomen:  Abdomen Findings: Normal    Musculoskeletal:  Musculoskeletal Findings: Normal   Skin:  Skin Findings: Normal    Mental Status:  Mental Status Findings: Normal        Anesthesia Plan  Type of Anesthesia, risks & benefits discussed:  Anesthesia Type:  general  Patient's Preference:   Intra-op Monitoring Plan: standard ASA monitors  Intra-op Monitoring Plan Comments:   Post Op Pain Control Plan: multimodal analgesia  Post Op Pain Control Plan Comments:   Induction:   IV  Beta Blocker:  Patient is not currently on a Beta-Blocker (No further documentation required).       Informed Consent: Patient understands risks and agrees with Anesthesia plan.  Questions answered. Anesthesia consent signed with patient.  ASA Score: 2     Day of Surgery Review of History & Physical: I have interviewed and examined the patient. I have reviewed the patient's H&P dated:  There are no significant changes.  H&P update referred to the provider.         Ready For Surgery From Anesthesia Perspective.

## 2020-08-21 NOTE — ANESTHESIA POSTPROCEDURE EVALUATION
Anesthesia Post Evaluation    Patient: [place] B Jolene    Procedure(s) Performed: Procedure(s) (LRB):  COLONOSCOPY (N/A)    Final Anesthesia Type: general    Patient location during evaluation: PACU  Patient participation: Yes- Able to Participate  Level of consciousness: awake and alert and oriented  Pain management: adequate  Airway patency: patent    PONV status at discharge: No PONV  Anesthetic complications: no      Cardiovascular status: blood pressure returned to baseline and hemodynamically stable  Respiratory status: unassisted  Hydration status: euvolemic  Follow-up not needed.          Vitals Value Taken Time   /68 08/21/20 1443   Temp 36.5 °C (97.7 °F) 08/21/20 1405   Pulse 59 08/21/20 1443   Resp 16 08/21/20 1443   SpO2 99 % 08/21/20 1443         Event Time   Out of Recovery 14:46:09         Pain/Holger Score: Holger Score: 10 (8/21/2020  2:19 PM)

## 2020-08-21 NOTE — [employer] PATIENT INSTRUCTIONS
Discharge Summary/Instructions after an Endoscopic Procedure  Patient Name: Gale Fernández  Patient MRN: [id number]  Patient YOB: 1945  Friday, August 21, 2020  Maicol Johnson MD  RESTRICTIONS:  During your procedure today, you received medications for sedation.  These   medications may affect your judgment, balance and coordination.  Therefore,   for 24 hours, you have the following restrictions:   - DO NOT drive a car, operate machinery, make legal/financial decisions,   sign important papers or drink alcohol.    ACTIVITY:  Today: no heavy lifting, straining or running due to procedural   sedation/anesthesia.  The following day: return to full activity including work.  DIET:  Eat and drink normally unless instructed otherwise.     TREATMENT FOR COMMON SIDE EFFECTS:  - Mild abdominal pain, nausea, belching, bloating or excessive gas:  rest,   eat lightly and use a heating pad.  - Sore Throat: treat with throat lozenges and/or gargle with warm salt   water.  - Because air was used during the procedure, expelling large amounts of air   from your rectum or belching is normal.  - If a bowel prep was taken, you may not have a bowel movement for 1-3 days.    This is normal.  SYMPTOMS TO WATCH FOR AND REPORT TO YOUR PHYSICIAN:  1. Abdominal pain or bloating, other than gas cramps.  2. Chest pain.  3. Back pain.  4. Signs of infection such as: chills or fever occurring within 24 hours   after the procedure.  5. Rectal bleeding, which would show as bright red, maroon, or black stools.   (A tablespoon of blood from the rectum is not serious, especially if   hemorrhoids are present.)  6. Vomiting.  7. Weakness or dizziness.  GO DIRECTLY TO THE NEAREST EMERGENCY ROOM IF YOU HAVE ANY OF THE FOLLOWING:      Difficulty breathing              Chills and/or fever over 101 F   Persistent vomiting and/or vomiting blood   Severe abdominal pain   Severe chest pain   Black, tarry stools   Bleeding- more than one tablespoon   Any  other symptom or condition that you feel may need urgent attention  Your doctor recommends these additional instructions:  If any biopsies were taken, your doctors clinic will contact you in 1 to 2   weeks with any results.  - Patient has a contact number available for emergencies.  The signs and   symptoms of potential delayed complications were discussed with the   patient.  Return to normal activities tomorrow.  Written discharge   instructions were provided to the patient.   - Discharge patient to home.   - Await pathology results.   - Repeat colonoscopy is not recommended due to current age (66 years or   older) for surveillance.   - The findings and recommendations were discussed with the designated   responsible adult.   - Resume Xarelto (rivaroxaban) at prior dose in 2 days.  For questions, problems or results please call your physician - Maicol Johnson MD at Work:  [phone].  [facility], EMERGENCY ROOM PHONE NUMBER: [phone]  IF A COMPLICATION OR EMERGENCY SITUATION ARISES AND YOU ARE UNABLE TO REACH   YOUR PHYSICIAN - GO DIRECTLY TO THE EMERGENCY ROOM.  Maicol Johnson MD  8/21/2020 2:00:49 PM  This report has been verified and signed electronically.  [employer]

## 2020-08-25 LAB
FINAL PATHOLOGIC DIAGNOSIS: NORMAL
GROSS: NORMAL

## 2020-09-15 ENCOUNTER — OFFICE VISIT (OUTPATIENT)
Dept: FAMILY MEDICINE | Facility: CLINIC | Age: 75
End: 2020-09-15
Payer: MEDICARE

## 2020-09-15 VITALS
TEMPERATURE: 98 F | SYSTOLIC BLOOD PRESSURE: 136 MMHG | RESPIRATION RATE: 16 BRPM | HEART RATE: 88 BPM | BODY MASS INDEX: 30.89 KG/M2 | HEIGHT: 65 IN | WEIGHT: 185.44 LBS | DIASTOLIC BLOOD PRESSURE: 84 MMHG | OXYGEN SATURATION: 99 %

## 2020-09-15 DIAGNOSIS — R73.03 PREDIABETES: ICD-10-CM

## 2020-09-15 DIAGNOSIS — I10 ESSENTIAL HYPERTENSION: ICD-10-CM

## 2020-09-15 DIAGNOSIS — L98.9 SKIN LESIONS: Primary | ICD-10-CM

## 2020-09-15 PROCEDURE — 1126F PR PAIN SEVERITY QUANTIFIED, NO PAIN PRESENT: ICD-10-PCS | Mod: HCNC,S$GLB,, | Performed by: INTERNAL MEDICINE

## 2020-09-15 PROCEDURE — 1159F PR MEDICATION LIST DOCUMENTED IN MEDICAL RECORD: ICD-10-PCS | Mod: HCNC,S$GLB,, | Performed by: INTERNAL MEDICINE

## 2020-09-15 PROCEDURE — 99999 PR PBB SHADOW E&M-EST. PATIENT-LVL III: ICD-10-PCS | Mod: PBBFAC,HCNC,, | Performed by: INTERNAL MEDICINE

## 2020-09-15 PROCEDURE — 3075F PR MOST RECENT SYSTOLIC BLOOD PRESS GE 130-139MM HG: ICD-10-PCS | Mod: HCNC,CPTII,S$GLB, | Performed by: INTERNAL MEDICINE

## 2020-09-15 PROCEDURE — 1101F PT FALLS ASSESS-DOCD LE1/YR: CPT | Mod: HCNC,CPTII,S$GLB, | Performed by: INTERNAL MEDICINE

## 2020-09-15 PROCEDURE — 3079F PR MOST RECENT DIASTOLIC BLOOD PRESSURE 80-89 MM HG: ICD-10-PCS | Mod: HCNC,CPTII,S$GLB, | Performed by: INTERNAL MEDICINE

## 2020-09-15 PROCEDURE — 3079F DIAST BP 80-89 MM HG: CPT | Mod: HCNC,CPTII,S$GLB, | Performed by: INTERNAL MEDICINE

## 2020-09-15 PROCEDURE — 1159F MED LIST DOCD IN RCRD: CPT | Mod: HCNC,S$GLB,, | Performed by: INTERNAL MEDICINE

## 2020-09-15 PROCEDURE — 1126F AMNT PAIN NOTED NONE PRSNT: CPT | Mod: HCNC,S$GLB,, | Performed by: INTERNAL MEDICINE

## 2020-09-15 PROCEDURE — 3008F BODY MASS INDEX DOCD: CPT | Mod: HCNC,CPTII,S$GLB, | Performed by: INTERNAL MEDICINE

## 2020-09-15 PROCEDURE — 99214 OFFICE O/P EST MOD 30 MIN: CPT | Mod: HCNC,S$GLB,, | Performed by: INTERNAL MEDICINE

## 2020-09-15 PROCEDURE — 3008F PR BODY MASS INDEX (BMI) DOCUMENTED: ICD-10-PCS | Mod: HCNC,CPTII,S$GLB, | Performed by: INTERNAL MEDICINE

## 2020-09-15 PROCEDURE — 3075F SYST BP GE 130 - 139MM HG: CPT | Mod: HCNC,CPTII,S$GLB, | Performed by: INTERNAL MEDICINE

## 2020-09-15 PROCEDURE — 99999 PR PBB SHADOW E&M-EST. PATIENT-LVL III: CPT | Mod: PBBFAC,HCNC,, | Performed by: INTERNAL MEDICINE

## 2020-09-15 PROCEDURE — 1101F PR PT FALLS ASSESS DOC 0-1 FALLS W/OUT INJ PAST YR: ICD-10-PCS | Mod: HCNC,CPTII,S$GLB, | Performed by: INTERNAL MEDICINE

## 2020-09-15 PROCEDURE — 99214 PR OFFICE/OUTPT VISIT, EST, LEVL IV, 30-39 MIN: ICD-10-PCS | Mod: HCNC,S$GLB,, | Performed by: INTERNAL MEDICINE

## 2020-09-15 RX ORDER — PHENAZOPYRIDINE HYDROCHLORIDE 200 MG/1
TABLET, FILM COATED ORAL
COMMUNITY
Start: 2020-06-08 | End: 2021-04-12

## 2020-09-15 RX ORDER — POLYETHYLENE GLYCOL 3350, SODIUM SULFATE ANHYDROUS, SODIUM BICARBONATE, SODIUM CHLORIDE, POTASSIUM CHLORIDE 236; 22.74; 6.74; 5.86; 2.97 G/4L; G/4L; G/4L; G/4L; G/4L
POWDER, FOR SOLUTION ORAL
COMMUNITY
Start: 2020-08-12 | End: 2021-06-14

## 2020-09-15 RX ORDER — LEVOCETIRIZINE DIHYDROCHLORIDE 5 MG/1
5 TABLET, FILM COATED ORAL NIGHTLY
Qty: 30 TABLET | Refills: 11 | Status: SHIPPED | OUTPATIENT
Start: 2020-09-15 | End: 2021-06-14

## 2020-09-15 RX ORDER — TRIAMCINOLONE ACETONIDE 5 MG/G
CREAM TOPICAL
Qty: 15 G | Refills: 2 | Status: SHIPPED | OUTPATIENT
Start: 2020-09-15 | End: 2021-07-14

## 2020-09-15 RX ORDER — CIPROFLOXACIN 500 MG/1
500 TABLET ORAL 2 TIMES DAILY
COMMUNITY
Start: 2020-06-08 | End: 2021-04-12

## 2020-09-15 NOTE — PROGRESS NOTES
Subjective:       Patient ID: Gale Fernández is a pleasant 75 y.o. White female patient    Chief Complaint: RASH ON ARM      Patient is a new pt to me but established pt from Dr. Hurtado, last visit on 07/15/2020, see her last notes and list of problems below.    HPI     She comes today for skin lesions on R arm that have been present for 6 weeks. It is on the upper arm, and the lesions are very itchy, mainly at night. First episode. 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 She has a cat but does not think it has fleas. She did not change body lotion, shower gel or detergent.  She tried to apply OTC hydrocortisone cream with mild relief. First episode. She has no lesions on other parts of the body.  Of note pt had shingle shot a little bit more than 2 months ago and wonders if it may be related.       Patient Active Problem List   Diagnosis    Essential hypertension    Osteopenia after menopause    SVT (supraventricular tachycardia)    Atrial fibrillation    Borderline hyperlipidemia    Current use of long term anticoagulation    Prediabetes    History of stroke    History of TIA (transient ischemic attack)    Overweight (BMI 25.0-29.9)    Anxiety    Burning mouth syndrome    Colon cancer screening          ACTIVE MEDICAL ISSUES:  Documented in Problem List     PAST MEDICAL HISTORY  Documented     PAST SURGICAL HISTORY:  Documented     SOCIAL HISTORY:  Documented     FAMILY HISTORY:  Documented     ALLERGIES AND MEDICATIONS: updated and reviewed.  Documented    Review of Systems   Constitutional: Negative.    Respiratory: Negative.    Cardiovascular: Negative.    Skin: Positive for rash (itchy skin lesions).   All other systems reviewed and are negative.      Objective:      Physical Exam  Vitals signs and nursing note reviewed.   Constitutional:       Appearance: Normal appearance.   HENT:      Right Ear: Tympanic membrane normal.      Left Ear: Tympanic membrane normal.   Cardiovascular:      Rate and Rhythm: Normal  "rate and regular rhythm.      Pulses: Normal pulses.      Heart sounds: Normal heart sounds.   Pulmonary:      Effort: Pulmonary effort is normal.      Breath sounds: Normal breath sounds.   Skin:     General: Skin is warm and dry.      Findings: Rash present.      Comments: See pictures in media. Several lesions on R upper arm, that are reddish, widespread, with scratching marks, no suspicion of infection, no vesicular lesions. No other lesions visible on other parts of the body.   Neurological:      Mental Status: She is alert.         Vitals:    09/15/20 0820   BP: 136/84   BP Location: Left arm   Patient Position: Sitting   BP Method: Large (Manual)   Pulse: 88   Resp: 16   Temp: 98.1 °F (36.7 °C)   SpO2: 99%   Weight: 84.1 kg (185 lb 6.5 oz)   Height: 5' 5" (1.651 m)     Body mass index is 30.85 kg/m².    RESULTS: Reviewed labs from last 12 months    Assessment:       1. Skin lesions    2. Essential hypertension    3. Prediabetes        Plan:   Douglas was seen today for rash on arm.    Diagnoses and all orders for this visit:    Skin lesions  -     triamcinolone acetonide 0.5% (KENALOG) 0.5 % Crea; Bid for one week then once a day, then PRN  -     levocetirizine (XYZAL) 5 MG tablet; Take 1 tablet (5 mg total) by mouth every evening.    See HPI, PE and pictures in media. Only on RUE, upper part. No nerve territory. No other lesion on the body.  Do not look like shingle lesions. Poison ivy? Mosquitos? I doubt it is flea bites or bedbugs.   Will order triamcinolone cream with all necessary explanations and antihistamine. Asked pt to contact me if not better, may have to refer to Dermatology.    Essential hypertension    BP at goal, same treatment.    Prediabetes    Not present in most recent blood work.    No follow-ups on file.    This note was created by combination of typed  and M-Modal dictation.  Transcription errors may be present.  If there are any questions, please contact me.    "

## 2020-09-18 ENCOUNTER — CLINICAL SUPPORT (OUTPATIENT)
Dept: FAMILY MEDICINE | Facility: CLINIC | Age: 75
End: 2020-09-18
Payer: MEDICARE

## 2020-09-18 DIAGNOSIS — Z23 NEED FOR SHINGLES VACCINE: Primary | ICD-10-CM

## 2020-09-18 PROCEDURE — 99499 UNLISTED E&M SERVICE: CPT | Mod: HCNC,S$GLB,, | Performed by: INTERNAL MEDICINE

## 2020-09-18 PROCEDURE — 90471 ZOSTER RECOMBINANT VACCINE: ICD-10-PCS | Mod: HCNC,S$GLB,, | Performed by: INTERNAL MEDICINE

## 2020-09-18 PROCEDURE — 90471 IMMUNIZATION ADMIN: CPT | Mod: HCNC,S$GLB,, | Performed by: INTERNAL MEDICINE

## 2020-09-18 PROCEDURE — 90750 ZOSTER RECOMBINANT VACCINE: ICD-10-PCS | Mod: HCNC,S$GLB,, | Performed by: INTERNAL MEDICINE

## 2020-09-18 PROCEDURE — 99499 NO LOS: ICD-10-PCS | Mod: HCNC,S$GLB,, | Performed by: INTERNAL MEDICINE

## 2020-09-18 PROCEDURE — 90750 HZV VACC RECOMBINANT IM: CPT | Mod: HCNC,S$GLB,, | Performed by: INTERNAL MEDICINE

## 2020-09-18 NOTE — PROGRESS NOTES
SHINGRIX  Lot# [id number], dose#2, Given IM Left Deltoid, tolerated well. Patient instructed to waited 15 minutes ,VIS form given

## 2020-11-06 ENCOUNTER — PATIENT MESSAGE (OUTPATIENT)
Dept: ELECTROPHYSIOLOGY | Facility: CLINIC | Age: 75
End: 2020-11-06

## 2020-11-06 DIAGNOSIS — Z86.79 HISTORY OF HYPERTENSION: ICD-10-CM

## 2020-11-06 RX ORDER — AMLODIPINE BESYLATE 2.5 MG/1
5 TABLET ORAL DAILY
Qty: 90 TABLET | Refills: 3 | Status: CANCELLED | OUTPATIENT
Start: 2020-11-06

## 2020-11-06 RX ORDER — AMLODIPINE BESYLATE 5 MG/1
5 TABLET ORAL DAILY
Qty: 30 TABLET | Refills: 11 | Status: SHIPPED | OUTPATIENT
Start: 2020-11-06 | End: 2021-09-08 | Stop reason: SDUPTHER

## 2020-11-06 NOTE — TELEPHONE ENCOUNTER
I sent rx to you for signature on the amlodipine 5mg daily. Please advise on the low-dose aspirin.  Thanks

## 2020-11-10 ENCOUNTER — PATIENT MESSAGE (OUTPATIENT)
Dept: NEUROLOGY | Facility: CLINIC | Age: 75
End: 2020-11-10

## 2020-11-10 RX ORDER — GABAPENTIN 100 MG/1
100 CAPSULE ORAL 3 TIMES DAILY
Qty: 90 CAPSULE | Refills: 0 | Status: SHIPPED | OUTPATIENT
Start: 2020-11-10 | End: 2021-07-20

## 2020-12-03 ENCOUNTER — PATIENT MESSAGE (OUTPATIENT)
Dept: FAMILY MEDICINE | Facility: CLINIC | Age: 75
End: 2020-12-03

## 2020-12-11 ENCOUNTER — PATIENT MESSAGE (OUTPATIENT)
Dept: OTHER | Facility: OTHER | Age: 75
End: 2020-12-11

## 2020-12-16 ENCOUNTER — TELEPHONE (OUTPATIENT)
Dept: ELECTROPHYSIOLOGY | Facility: CLINIC | Age: 75
End: 2020-12-16

## 2020-12-16 NOTE — TELEPHONE ENCOUNTER
I called patient to address her question about the covid vaccine . I advised patient that we will not offer the vaccine in cardiology and that she will need to contact her primary care provider

## 2020-12-20 ENCOUNTER — PATIENT MESSAGE (OUTPATIENT)
Dept: FAMILY MEDICINE | Facility: CLINIC | Age: 75
End: 2020-12-20

## 2021-01-04 ENCOUNTER — PATIENT MESSAGE (OUTPATIENT)
Dept: FAMILY MEDICINE | Facility: CLINIC | Age: 76
End: 2021-01-04

## 2021-01-08 ENCOUNTER — PATIENT MESSAGE (OUTPATIENT)
Dept: FAMILY MEDICINE | Facility: CLINIC | Age: 76
End: 2021-01-08

## 2021-03-10 ENCOUNTER — PATIENT MESSAGE (OUTPATIENT)
Dept: FAMILY MEDICINE | Facility: CLINIC | Age: 76
End: 2021-03-10

## 2021-03-15 PROBLEM — H52.7 REFRACTIVE ERROR: Status: ACTIVE | Noted: 2021-03-15

## 2021-03-15 PROBLEM — Z97.3 WEARS CONTACT LENSES: Status: ACTIVE | Noted: 2021-03-15

## 2021-03-16 ENCOUNTER — OFFICE VISIT (OUTPATIENT)
Dept: OPTOMETRY | Facility: CLINIC | Age: 76
End: 2021-03-16
Payer: MEDICARE

## 2021-03-16 DIAGNOSIS — Z46.0 ENCOUNTER FOR FITTING OR ADJUSTMENT OF SPECTACLES OR CONTACT LENSES: Primary | ICD-10-CM

## 2021-03-16 DIAGNOSIS — H52.7 REFRACTIVE ERROR: ICD-10-CM

## 2021-03-16 DIAGNOSIS — Z97.3 WEARS CONTACT LENSES: ICD-10-CM

## 2021-03-16 DIAGNOSIS — H25.13 NUCLEAR SCLEROSIS OF BOTH EYES: Primary | ICD-10-CM

## 2021-03-16 PROCEDURE — 99999 PR PBB SHADOW E&M-EST. PATIENT-LVL III: CPT | Mod: PBBFAC,,, | Performed by: OPTOMETRIST

## 2021-03-16 PROCEDURE — 1126F AMNT PAIN NOTED NONE PRSNT: CPT | Mod: S$GLB,,, | Performed by: OPTOMETRIST

## 2021-03-16 PROCEDURE — 99999 PR PBB SHADOW E&M-EST. PATIENT-LVL III: ICD-10-PCS | Mod: PBBFAC,,, | Performed by: OPTOMETRIST

## 2021-03-16 PROCEDURE — 92014 COMPRE OPH EXAM EST PT 1/>: CPT | Mod: S$GLB,,, | Performed by: OPTOMETRIST

## 2021-03-16 PROCEDURE — 92310 PR CONTACT LENS FITTING (NO CHANGE): ICD-10-PCS | Mod: CSM,,, | Performed by: OPTOMETRIST

## 2021-03-16 PROCEDURE — 92015 DETERMINE REFRACTIVE STATE: CPT | Mod: S$GLB,,, | Performed by: OPTOMETRIST

## 2021-03-16 PROCEDURE — 3288F PR FALLS RISK ASSESSMENT DOCUMENTED: ICD-10-PCS | Mod: CPTII,S$GLB,, | Performed by: OPTOMETRIST

## 2021-03-16 PROCEDURE — 3288F FALL RISK ASSESSMENT DOCD: CPT | Mod: CPTII,S$GLB,, | Performed by: OPTOMETRIST

## 2021-03-16 PROCEDURE — 1101F PT FALLS ASSESS-DOCD LE1/YR: CPT | Mod: CPTII,S$GLB,, | Performed by: OPTOMETRIST

## 2021-03-16 PROCEDURE — 92014 PR EYE EXAM, EST PATIENT,COMPREHESV: ICD-10-PCS | Mod: S$GLB,,, | Performed by: OPTOMETRIST

## 2021-03-16 PROCEDURE — 1126F PR PAIN SEVERITY QUANTIFIED, NO PAIN PRESENT: ICD-10-PCS | Mod: S$GLB,,, | Performed by: OPTOMETRIST

## 2021-03-16 PROCEDURE — 92310 CONTACT LENS FITTING OU: CPT | Mod: CSM,,, | Performed by: OPTOMETRIST

## 2021-03-16 PROCEDURE — 99499 UNLISTED E&M SERVICE: CPT | Mod: S$GLB,,, | Performed by: OPTOMETRIST

## 2021-03-16 PROCEDURE — 1101F PR PT FALLS ASSESS DOC 0-1 FALLS W/OUT INJ PAST YR: ICD-10-PCS | Mod: CPTII,S$GLB,, | Performed by: OPTOMETRIST

## 2021-03-16 PROCEDURE — 92015 PR REFRACTION: ICD-10-PCS | Mod: S$GLB,,, | Performed by: OPTOMETRIST

## 2021-03-16 PROCEDURE — 99499 NO LOS: ICD-10-PCS | Mod: S$GLB,,, | Performed by: OPTOMETRIST

## 2021-04-12 ENCOUNTER — OFFICE VISIT (OUTPATIENT)
Dept: FAMILY MEDICINE | Facility: CLINIC | Age: 76
End: 2021-04-12
Payer: MEDICARE

## 2021-04-12 VITALS
SYSTOLIC BLOOD PRESSURE: 138 MMHG | DIASTOLIC BLOOD PRESSURE: 82 MMHG | BODY MASS INDEX: 31.65 KG/M2 | HEART RATE: 84 BPM | WEIGHT: 189.94 LBS | HEIGHT: 65 IN | TEMPERATURE: 99 F | OXYGEN SATURATION: 97 %

## 2021-04-12 DIAGNOSIS — I10 ESSENTIAL HYPERTENSION: ICD-10-CM

## 2021-04-12 DIAGNOSIS — R51.9 NONINTRACTABLE HEADACHE, UNSPECIFIED CHRONICITY PATTERN, UNSPECIFIED HEADACHE TYPE: ICD-10-CM

## 2021-04-12 DIAGNOSIS — I48.0 PAROXYSMAL ATRIAL FIBRILLATION: ICD-10-CM

## 2021-04-12 DIAGNOSIS — R42 VERTIGO: Primary | ICD-10-CM

## 2021-04-12 DIAGNOSIS — Z86.73 HISTORY OF STROKE: ICD-10-CM

## 2021-04-12 PROBLEM — Z12.11 COLON CANCER SCREENING: Status: RESOLVED | Noted: 2020-08-21 | Resolved: 2021-04-12

## 2021-04-12 PROCEDURE — 1159F PR MEDICATION LIST DOCUMENTED IN MEDICAL RECORD: ICD-10-PCS | Mod: S$GLB,,, | Performed by: INTERNAL MEDICINE

## 2021-04-12 PROCEDURE — 1101F PT FALLS ASSESS-DOCD LE1/YR: CPT | Mod: CPTII,S$GLB,, | Performed by: INTERNAL MEDICINE

## 2021-04-12 PROCEDURE — 99999 PR PBB SHADOW E&M-EST. PATIENT-LVL V: ICD-10-PCS | Mod: PBBFAC,,, | Performed by: INTERNAL MEDICINE

## 2021-04-12 PROCEDURE — 99499 RISK ADDL DX/OHS AUDIT: ICD-10-PCS | Mod: HCNC,S$GLB,, | Performed by: INTERNAL MEDICINE

## 2021-04-12 PROCEDURE — 99999 PR PBB SHADOW E&M-EST. PATIENT-LVL V: CPT | Mod: PBBFAC,,, | Performed by: INTERNAL MEDICINE

## 2021-04-12 PROCEDURE — 3288F FALL RISK ASSESSMENT DOCD: CPT | Mod: CPTII,S$GLB,, | Performed by: INTERNAL MEDICINE

## 2021-04-12 PROCEDURE — 1126F AMNT PAIN NOTED NONE PRSNT: CPT | Mod: S$GLB,,, | Performed by: INTERNAL MEDICINE

## 2021-04-12 PROCEDURE — 3288F PR FALLS RISK ASSESSMENT DOCUMENTED: ICD-10-PCS | Mod: CPTII,S$GLB,, | Performed by: INTERNAL MEDICINE

## 2021-04-12 PROCEDURE — 99499 UNLISTED E&M SERVICE: CPT | Mod: HCNC,S$GLB,, | Performed by: INTERNAL MEDICINE

## 2021-04-12 PROCEDURE — 99214 OFFICE O/P EST MOD 30 MIN: CPT | Mod: S$GLB,,, | Performed by: INTERNAL MEDICINE

## 2021-04-12 PROCEDURE — 99214 PR OFFICE/OUTPT VISIT, EST, LEVL IV, 30-39 MIN: ICD-10-PCS | Mod: S$GLB,,, | Performed by: INTERNAL MEDICINE

## 2021-04-12 PROCEDURE — 1126F PR PAIN SEVERITY QUANTIFIED, NO PAIN PRESENT: ICD-10-PCS | Mod: S$GLB,,, | Performed by: INTERNAL MEDICINE

## 2021-04-12 PROCEDURE — 1101F PR PT FALLS ASSESS DOC 0-1 FALLS W/OUT INJ PAST YR: ICD-10-PCS | Mod: CPTII,S$GLB,, | Performed by: INTERNAL MEDICINE

## 2021-04-12 PROCEDURE — 1159F MED LIST DOCD IN RCRD: CPT | Mod: S$GLB,,, | Performed by: INTERNAL MEDICINE

## 2021-04-12 RX ORDER — MECLIZINE HCL 12.5 MG 12.5 MG/1
12.5 TABLET ORAL 3 TIMES DAILY PRN
Qty: 60 TABLET | Refills: 0 | Status: SHIPPED | OUTPATIENT
Start: 2021-04-12 | End: 2021-06-14

## 2021-04-14 ENCOUNTER — TELEPHONE (OUTPATIENT)
Dept: FAMILY MEDICINE | Facility: CLINIC | Age: 76
End: 2021-04-14

## 2021-04-25 ENCOUNTER — PATIENT MESSAGE (OUTPATIENT)
Dept: FAMILY MEDICINE | Facility: CLINIC | Age: 76
End: 2021-04-25

## 2021-04-26 RX ORDER — ATORVASTATIN CALCIUM 40 MG/1
20 TABLET, FILM COATED ORAL DAILY
Qty: 45 TABLET | Refills: 3 | Status: CANCELLED | OUTPATIENT
Start: 2021-04-26 | End: 2022-04-26

## 2021-04-26 RX ORDER — ATORVASTATIN CALCIUM 40 MG/1
40 TABLET, FILM COATED ORAL DAILY
Qty: 90 TABLET | Refills: 0 | Status: SHIPPED | OUTPATIENT
Start: 2021-04-26 | End: 2021-05-31 | Stop reason: SDUPTHER

## 2021-05-31 DIAGNOSIS — E78.5 BORDERLINE HYPERLIPIDEMIA: Primary | ICD-10-CM

## 2021-05-31 DIAGNOSIS — I48.0 PAROXYSMAL ATRIAL FIBRILLATION: ICD-10-CM

## 2021-05-31 RX ORDER — RIVAROXABAN 20 MG/1
1 TABLET, FILM COATED ORAL NIGHTLY
Qty: 30 TABLET | Refills: 5 | Status: SHIPPED | OUTPATIENT
Start: 2021-05-31 | End: 2022-01-11 | Stop reason: SDUPTHER

## 2021-05-31 RX ORDER — ATORVASTATIN CALCIUM 40 MG/1
40 TABLET, FILM COATED ORAL DAILY
Qty: 90 TABLET | Refills: 0 | Status: SHIPPED | OUTPATIENT
Start: 2021-05-31 | End: 2021-08-16 | Stop reason: SDUPTHER

## 2021-06-10 ENCOUNTER — TELEPHONE (OUTPATIENT)
Dept: FAMILY MEDICINE | Facility: CLINIC | Age: 76
End: 2021-06-10

## 2021-06-10 ENCOUNTER — LAB VISIT (OUTPATIENT)
Dept: LAB | Facility: HOSPITAL | Age: 76
End: 2021-06-10
Attending: INTERNAL MEDICINE
Payer: MEDICARE

## 2021-06-10 DIAGNOSIS — R30.0 DYSURIA: ICD-10-CM

## 2021-06-10 DIAGNOSIS — R30.0 DYSURIA: Primary | ICD-10-CM

## 2021-06-10 LAB
BACTERIA #/AREA URNS AUTO: ABNORMAL /HPF
BILIRUB UR QL STRIP: NEGATIVE
CLARITY UR REFRACT.AUTO: ABNORMAL
COLOR UR AUTO: YELLOW
GLUCOSE UR QL STRIP: NEGATIVE
HGB UR QL STRIP: NEGATIVE
KETONES UR QL STRIP: NEGATIVE
LEUKOCYTE ESTERASE UR QL STRIP: ABNORMAL
MICROSCOPIC COMMENT: ABNORMAL
NITRITE UR QL STRIP: NEGATIVE
PH UR STRIP: 6 [PH] (ref 5–8)
PROT UR QL STRIP: NEGATIVE
RBC #/AREA URNS AUTO: 4 /HPF (ref 0–4)
SP GR UR STRIP: 1.02 (ref 1–1.03)
SQUAMOUS #/AREA URNS AUTO: 3 /HPF
URN SPEC COLLECT METH UR: ABNORMAL
WBC #/AREA URNS AUTO: 10 /HPF (ref 0–5)

## 2021-06-10 PROCEDURE — 81001 URINALYSIS AUTO W/SCOPE: CPT | Performed by: FAMILY MEDICINE

## 2021-06-10 NOTE — TELEPHONE ENCOUNTER
Spoke with pt she states she had a cup from provider from 2019.She states due to having frequent UTI she was informed to drop off a sample when experiencing symptoms.

## 2021-06-10 NOTE — TELEPHONE ENCOUNTER
----- Message from Vickie Hurtado MD sent at 6/10/2021  2:39 PM CDT -----  Regarding: RE: lab  I have not communicated with her recently so I do not know why she dropped off the specimen. Please call patient to clarify. Thanks!  ----- Message -----  From: Regi Hannah  Sent: 6/10/2021   1:45 PM CDT  To: Vickie Hurtado MD, [facility] Staff  Subject: lab                                              Good Afternoon,   Ms. Fernández dropped off a urine sample but we do not have any orders linked. Please link her orders to an appointment and/or add the test to the active tab. The lab will not be able to process the urine without an order.  Thank you,  Regi ext [phone]

## 2021-06-10 NOTE — TELEPHONE ENCOUNTER
Orders placed. Please advise patient final results will take 72 hours.  In the future she needs to alert us before dropping off a specimen so we can place an order first.

## 2021-06-14 ENCOUNTER — OFFICE VISIT (OUTPATIENT)
Dept: FAMILY MEDICINE | Facility: CLINIC | Age: 76
End: 2021-06-14
Payer: MEDICARE

## 2021-06-14 VITALS
SYSTOLIC BLOOD PRESSURE: 136 MMHG | HEART RATE: 82 BPM | DIASTOLIC BLOOD PRESSURE: 100 MMHG | WEIGHT: 191.56 LBS | RESPIRATION RATE: 16 BRPM | TEMPERATURE: 98 F | BODY MASS INDEX: 31.92 KG/M2 | HEIGHT: 65 IN | OXYGEN SATURATION: 97 %

## 2021-06-14 DIAGNOSIS — I89.0 LYMPHEDEMA: ICD-10-CM

## 2021-06-14 DIAGNOSIS — I48.11 LONGSTANDING PERSISTENT ATRIAL FIBRILLATION: ICD-10-CM

## 2021-06-14 DIAGNOSIS — N30.00 ACUTE CYSTITIS WITHOUT HEMATURIA: Primary | ICD-10-CM

## 2021-06-14 DIAGNOSIS — M25.562 ACUTE PAIN OF LEFT KNEE: ICD-10-CM

## 2021-06-14 PROCEDURE — 1159F PR MEDICATION LIST DOCUMENTED IN MEDICAL RECORD: ICD-10-PCS | Mod: S$GLB,,, | Performed by: FAMILY MEDICINE

## 2021-06-14 PROCEDURE — 1100F PR PT FALLS ASSESS DOC 2+ FALLS/FALL W/INJURY/YR: ICD-10-PCS | Mod: CPTII,S$GLB,, | Performed by: FAMILY MEDICINE

## 2021-06-14 PROCEDURE — 99214 PR OFFICE/OUTPT VISIT, EST, LEVL IV, 30-39 MIN: ICD-10-PCS | Mod: S$GLB,,, | Performed by: FAMILY MEDICINE

## 2021-06-14 PROCEDURE — 3288F PR FALLS RISK ASSESSMENT DOCUMENTED: ICD-10-PCS | Mod: CPTII,S$GLB,, | Performed by: FAMILY MEDICINE

## 2021-06-14 PROCEDURE — 1126F AMNT PAIN NOTED NONE PRSNT: CPT | Mod: S$GLB,,, | Performed by: FAMILY MEDICINE

## 2021-06-14 PROCEDURE — 3288F FALL RISK ASSESSMENT DOCD: CPT | Mod: CPTII,S$GLB,, | Performed by: FAMILY MEDICINE

## 2021-06-14 PROCEDURE — 1100F PTFALLS ASSESS-DOCD GE2>/YR: CPT | Mod: CPTII,S$GLB,, | Performed by: FAMILY MEDICINE

## 2021-06-14 PROCEDURE — 99214 OFFICE O/P EST MOD 30 MIN: CPT | Mod: S$GLB,,, | Performed by: FAMILY MEDICINE

## 2021-06-14 PROCEDURE — 1126F PR PAIN SEVERITY QUANTIFIED, NO PAIN PRESENT: ICD-10-PCS | Mod: S$GLB,,, | Performed by: FAMILY MEDICINE

## 2021-06-14 PROCEDURE — 1159F MED LIST DOCD IN RCRD: CPT | Mod: S$GLB,,, | Performed by: FAMILY MEDICINE

## 2021-06-14 PROCEDURE — 99999 PR PBB SHADOW E&M-EST. PATIENT-LVL III: CPT | Mod: PBBFAC,,, | Performed by: FAMILY MEDICINE

## 2021-06-14 PROCEDURE — 99999 PR PBB SHADOW E&M-EST. PATIENT-LVL III: ICD-10-PCS | Mod: PBBFAC,,, | Performed by: FAMILY MEDICINE

## 2021-06-14 RX ORDER — SULFAMETHOXAZOLE AND TRIMETHOPRIM 800; 160 MG/1; MG/1
1 TABLET ORAL 2 TIMES DAILY
Qty: 10 TABLET | Refills: 0 | Status: SHIPPED | OUTPATIENT
Start: 2021-06-14 | End: 2021-06-19

## 2021-06-14 RX ORDER — FUROSEMIDE 20 MG/1
20 TABLET ORAL 2 TIMES DAILY PRN
Qty: 30 TABLET | Refills: 1 | Status: SHIPPED | OUTPATIENT
Start: 2021-06-14 | End: 2021-09-08 | Stop reason: SDUPTHER

## 2021-06-14 RX ORDER — LIDOCAINE AND PRILOCAINE 25; 25 MG/G; MG/G
CREAM TOPICAL
COMMUNITY
Start: 2021-05-21 | End: 2021-07-20

## 2021-06-17 ENCOUNTER — TELEPHONE (OUTPATIENT)
Dept: ELECTROPHYSIOLOGY | Facility: CLINIC | Age: 76
End: 2021-06-17

## 2021-06-17 DIAGNOSIS — I49.8 OTHER SPECIFIED CARDIAC ARRHYTHMIAS: Primary | ICD-10-CM

## 2021-06-21 ENCOUNTER — CLINICAL SUPPORT (OUTPATIENT)
Dept: CARDIOLOGY | Facility: HOSPITAL | Age: 76
End: 2021-06-21
Attending: NURSE PRACTITIONER
Payer: MEDICARE

## 2021-06-21 DIAGNOSIS — I48.0 PAROXYSMAL ATRIAL FIBRILLATION: ICD-10-CM

## 2021-06-21 PROCEDURE — 93227 XTRNL ECG REC<48 HR R&I: CPT | Mod: ,,, | Performed by: STUDENT IN AN ORGANIZED HEALTH CARE EDUCATION/TRAINING PROGRAM

## 2021-06-21 PROCEDURE — 93227 HOLTER MONITOR - 48 HOUR (CUPID ONLY): ICD-10-PCS | Mod: ,,, | Performed by: STUDENT IN AN ORGANIZED HEALTH CARE EDUCATION/TRAINING PROGRAM

## 2021-06-21 PROCEDURE — 93226 XTRNL ECG REC<48 HR SCAN A/R: CPT

## 2021-06-22 ENCOUNTER — TELEPHONE (OUTPATIENT)
Dept: ELECTROPHYSIOLOGY | Facility: CLINIC | Age: 76
End: 2021-06-22

## 2021-06-24 ENCOUNTER — PATIENT MESSAGE (OUTPATIENT)
Dept: ELECTROPHYSIOLOGY | Facility: CLINIC | Age: 76
End: 2021-06-24

## 2021-06-24 LAB
OHS CV EVENT MONITOR DAY: 0
OHS CV HOLTER LENGTH DECIMAL HOURS: 48
OHS CV HOLTER LENGTH HOURS: 48
OHS CV HOLTER LENGTH MINUTES: 0

## 2021-06-29 ENCOUNTER — TELEPHONE (OUTPATIENT)
Dept: FAMILY MEDICINE | Facility: CLINIC | Age: 76
End: 2021-06-29

## 2021-06-29 DIAGNOSIS — R60.9 EDEMA, UNSPECIFIED TYPE: ICD-10-CM

## 2021-06-29 DIAGNOSIS — E78.5 BORDERLINE HYPERLIPIDEMIA: Primary | ICD-10-CM

## 2021-06-29 DIAGNOSIS — R73.03 PREDIABETES: ICD-10-CM

## 2021-06-29 DIAGNOSIS — I10 ESSENTIAL HYPERTENSION: ICD-10-CM

## 2021-06-29 DIAGNOSIS — R42 VERTIGO: ICD-10-CM

## 2021-06-30 ENCOUNTER — LAB VISIT (OUTPATIENT)
Dept: LAB | Facility: HOSPITAL | Age: 76
End: 2021-06-30
Attending: INTERNAL MEDICINE
Payer: MEDICARE

## 2021-06-30 DIAGNOSIS — I10 ESSENTIAL HYPERTENSION: ICD-10-CM

## 2021-06-30 DIAGNOSIS — R60.9 EDEMA, UNSPECIFIED TYPE: ICD-10-CM

## 2021-06-30 DIAGNOSIS — R73.03 PREDIABETES: ICD-10-CM

## 2021-06-30 DIAGNOSIS — E78.5 BORDERLINE HYPERLIPIDEMIA: ICD-10-CM

## 2021-06-30 LAB
ALBUMIN SERPL BCP-MCNC: 3.6 G/DL (ref 3.5–5.2)
ALP SERPL-CCNC: 56 U/L (ref 55–135)
ALT SERPL W/O P-5'-P-CCNC: 15 U/L (ref 10–44)
ANION GAP SERPL CALC-SCNC: 10 MMOL/L (ref 8–16)
AST SERPL-CCNC: 24 U/L (ref 10–40)
BASOPHILS # BLD AUTO: 0.04 K/UL (ref 0–0.2)
BASOPHILS NFR BLD: 0.6 % (ref 0–1.9)
BILIRUB SERPL-MCNC: 0.5 MG/DL (ref 0.1–1)
BNP SERPL-MCNC: 215 PG/ML (ref 0–99)
BUN SERPL-MCNC: 26 MG/DL (ref 8–23)
CALCIUM SERPL-MCNC: 9.2 MG/DL (ref 8.7–10.5)
CHLORIDE SERPL-SCNC: 110 MMOL/L (ref 95–110)
CHOLEST SERPL-MCNC: 121 MG/DL (ref 120–199)
CHOLEST/HDLC SERPL: 3 {RATIO} (ref 2–5)
CO2 SERPL-SCNC: 22 MMOL/L (ref 23–29)
CREAT SERPL-MCNC: 0.8 MG/DL (ref 0.5–1.4)
DIFFERENTIAL METHOD: ABNORMAL
EOSINOPHIL # BLD AUTO: 0.3 K/UL (ref 0–0.5)
EOSINOPHIL NFR BLD: 4.9 % (ref 0–8)
ERYTHROCYTE [DISTWIDTH] IN BLOOD BY AUTOMATED COUNT: 12.8 % (ref 11.5–14.5)
EST. GFR  (AFRICAN AMERICAN): >60 ML/MIN/1.73 M^2
EST. GFR  (NON AFRICAN AMERICAN): >60 ML/MIN/1.73 M^2
ESTIMATED AVG GLUCOSE: 111 MG/DL (ref 68–131)
GLUCOSE SERPL-MCNC: 105 MG/DL (ref 70–110)
HBA1C MFR BLD: 5.5 % (ref 4–5.6)
HCT VFR BLD AUTO: 42.2 % (ref 37–48.5)
HDLC SERPL-MCNC: 41 MG/DL (ref 40–75)
HDLC SERPL: 33.9 % (ref 20–50)
HGB BLD-MCNC: 13.1 G/DL (ref 12–16)
IMM GRANULOCYTES # BLD AUTO: 0.02 K/UL (ref 0–0.04)
IMM GRANULOCYTES NFR BLD AUTO: 0.3 % (ref 0–0.5)
LDLC SERPL CALC-MCNC: 68.6 MG/DL (ref 63–159)
LYMPHOCYTES # BLD AUTO: 2.1 K/UL (ref 1–4.8)
LYMPHOCYTES NFR BLD: 32.7 % (ref 18–48)
MCH RBC QN AUTO: 30.4 PG (ref 27–31)
MCHC RBC AUTO-ENTMCNC: 31 G/DL (ref 32–36)
MCV RBC AUTO: 98 FL (ref 82–98)
MONOCYTES # BLD AUTO: 0.5 K/UL (ref 0.3–1)
MONOCYTES NFR BLD: 8 % (ref 4–15)
NEUTROPHILS # BLD AUTO: 3.4 K/UL (ref 1.8–7.7)
NEUTROPHILS NFR BLD: 53.5 % (ref 38–73)
NONHDLC SERPL-MCNC: 80 MG/DL
NRBC BLD-RTO: 0 /100 WBC
PLATELET # BLD AUTO: 224 K/UL (ref 150–450)
PMV BLD AUTO: 10.5 FL (ref 9.2–12.9)
POTASSIUM SERPL-SCNC: 5 MMOL/L (ref 3.5–5.1)
PROT SERPL-MCNC: 6.6 G/DL (ref 6–8.4)
RBC # BLD AUTO: 4.31 M/UL (ref 4–5.4)
SODIUM SERPL-SCNC: 142 MMOL/L (ref 136–145)
TRIGL SERPL-MCNC: 57 MG/DL (ref 30–150)
WBC # BLD AUTO: 6.37 K/UL (ref 3.9–12.7)

## 2021-06-30 PROCEDURE — 36415 COLL VENOUS BLD VENIPUNCTURE: CPT | Mod: PO | Performed by: INTERNAL MEDICINE

## 2021-06-30 PROCEDURE — 83036 HEMOGLOBIN GLYCOSYLATED A1C: CPT | Performed by: INTERNAL MEDICINE

## 2021-06-30 PROCEDURE — 80061 LIPID PANEL: CPT | Performed by: INTERNAL MEDICINE

## 2021-06-30 PROCEDURE — 85025 COMPLETE CBC W/AUTO DIFF WBC: CPT | Performed by: INTERNAL MEDICINE

## 2021-06-30 PROCEDURE — 80053 COMPREHEN METABOLIC PANEL: CPT | Performed by: INTERNAL MEDICINE

## 2021-06-30 PROCEDURE — 83880 ASSAY OF NATRIURETIC PEPTIDE: CPT | Performed by: INTERNAL MEDICINE

## 2021-07-01 ENCOUNTER — PATIENT MESSAGE (OUTPATIENT)
Dept: FAMILY MEDICINE | Facility: CLINIC | Age: 76
End: 2021-07-01

## 2021-07-02 ENCOUNTER — PATIENT MESSAGE (OUTPATIENT)
Dept: FAMILY MEDICINE | Facility: CLINIC | Age: 76
End: 2021-07-02

## 2021-07-07 ENCOUNTER — OFFICE VISIT (OUTPATIENT)
Dept: ELECTROPHYSIOLOGY | Facility: CLINIC | Age: 76
End: 2021-07-07
Payer: MEDICARE

## 2021-07-07 ENCOUNTER — HOSPITAL ENCOUNTER (OUTPATIENT)
Dept: CARDIOLOGY | Facility: CLINIC | Age: 76
Discharge: HOME | End: 2021-07-07
Payer: MEDICARE

## 2021-07-07 VITALS
BODY MASS INDEX: 31.19 KG/M2 | HEIGHT: 65 IN | DIASTOLIC BLOOD PRESSURE: 80 MMHG | SYSTOLIC BLOOD PRESSURE: 119 MMHG | WEIGHT: 187.19 LBS

## 2021-07-07 DIAGNOSIS — Z86.73 HISTORY OF TIA (TRANSIENT ISCHEMIC ATTACK): ICD-10-CM

## 2021-07-07 DIAGNOSIS — I10 ESSENTIAL HYPERTENSION: ICD-10-CM

## 2021-07-07 DIAGNOSIS — I48.19 PERSISTENT ATRIAL FIBRILLATION: Primary | ICD-10-CM

## 2021-07-07 DIAGNOSIS — F41.9 ANXIETY: ICD-10-CM

## 2021-07-07 DIAGNOSIS — I49.8 OTHER SPECIFIED CARDIAC ARRHYTHMIAS: ICD-10-CM

## 2021-07-07 DIAGNOSIS — R60.9 EDEMA, UNSPECIFIED TYPE: ICD-10-CM

## 2021-07-07 PROCEDURE — 93010 RHYTHM STRIP: ICD-10-PCS | Mod: S$GLB,,, | Performed by: INTERNAL MEDICINE

## 2021-07-07 PROCEDURE — 93005 RHYTHM STRIP: ICD-10-PCS | Mod: S$GLB,,, | Performed by: INTERNAL MEDICINE

## 2021-07-07 PROCEDURE — 93005 ELECTROCARDIOGRAM TRACING: CPT | Mod: S$GLB,,, | Performed by: INTERNAL MEDICINE

## 2021-07-07 PROCEDURE — 99215 PR OFFICE/OUTPT VISIT, EST, LEVL V, 40-54 MIN: ICD-10-PCS | Mod: S$GLB,,, | Performed by: INTERNAL MEDICINE

## 2021-07-07 PROCEDURE — 99499 RISK ADDL DX/OHS AUDIT: ICD-10-PCS | Mod: S$GLB,,, | Performed by: INTERNAL MEDICINE

## 2021-07-07 PROCEDURE — 1159F MED LIST DOCD IN RCRD: CPT | Mod: S$GLB,,, | Performed by: INTERNAL MEDICINE

## 2021-07-07 PROCEDURE — 3288F FALL RISK ASSESSMENT DOCD: CPT | Mod: CPTII,S$GLB,, | Performed by: INTERNAL MEDICINE

## 2021-07-07 PROCEDURE — 1159F PR MEDICATION LIST DOCUMENTED IN MEDICAL RECORD: ICD-10-PCS | Mod: S$GLB,,, | Performed by: INTERNAL MEDICINE

## 2021-07-07 PROCEDURE — 99999 PR PBB SHADOW E&M-EST. PATIENT-LVL III: ICD-10-PCS | Mod: PBBFAC,,, | Performed by: INTERNAL MEDICINE

## 2021-07-07 PROCEDURE — 1126F AMNT PAIN NOTED NONE PRSNT: CPT | Mod: S$GLB,,, | Performed by: INTERNAL MEDICINE

## 2021-07-07 PROCEDURE — 99999 PR PBB SHADOW E&M-EST. PATIENT-LVL III: CPT | Mod: PBBFAC,,, | Performed by: INTERNAL MEDICINE

## 2021-07-07 PROCEDURE — 1101F PR PT FALLS ASSESS DOC 0-1 FALLS W/OUT INJ PAST YR: ICD-10-PCS | Mod: CPTII,S$GLB,, | Performed by: INTERNAL MEDICINE

## 2021-07-07 PROCEDURE — 3288F PR FALLS RISK ASSESSMENT DOCUMENTED: ICD-10-PCS | Mod: CPTII,S$GLB,, | Performed by: INTERNAL MEDICINE

## 2021-07-07 PROCEDURE — 99499 UNLISTED E&M SERVICE: CPT | Mod: S$GLB,,, | Performed by: INTERNAL MEDICINE

## 2021-07-07 PROCEDURE — 93010 ELECTROCARDIOGRAM REPORT: CPT | Mod: S$GLB,,, | Performed by: INTERNAL MEDICINE

## 2021-07-07 PROCEDURE — 99215 OFFICE O/P EST HI 40 MIN: CPT | Mod: S$GLB,,, | Performed by: INTERNAL MEDICINE

## 2021-07-07 PROCEDURE — 1101F PT FALLS ASSESS-DOCD LE1/YR: CPT | Mod: CPTII,S$GLB,, | Performed by: INTERNAL MEDICINE

## 2021-07-07 PROCEDURE — 1126F PR PAIN SEVERITY QUANTIFIED, NO PAIN PRESENT: ICD-10-PCS | Mod: S$GLB,,, | Performed by: INTERNAL MEDICINE

## 2021-07-09 DIAGNOSIS — I48.19 PERSISTENT ATRIAL FIBRILLATION: Primary | ICD-10-CM

## 2021-07-13 ENCOUNTER — HOSPITAL ENCOUNTER (OUTPATIENT)
Dept: CARDIOLOGY | Facility: HOSPITAL | Age: 76
Discharge: HOME | End: 2021-07-13
Attending: INTERNAL MEDICINE
Payer: MEDICARE

## 2021-07-13 VITALS
HEIGHT: 65 IN | SYSTOLIC BLOOD PRESSURE: 132 MMHG | BODY MASS INDEX: 31.16 KG/M2 | DIASTOLIC BLOOD PRESSURE: 80 MMHG | WEIGHT: 187 LBS | HEART RATE: 82 BPM

## 2021-07-13 LAB
ASCENDING AORTA: 3.04 CM
AV INDEX (PROSTH): 0.73
AV MEAN GRADIENT: 2 MMHG
AV PEAK GRADIENT: 3 MMHG
AV VALVE AREA: 2.73 CM2
AV VELOCITY RATIO: 0.74
BSA FOR ECHO PROCEDURE: 1.97 M2
CV ECHO LV RWT: 0.42 CM
DOP CALC AO PEAK VEL: 0.88 M/S
DOP CALC AO VTI: 15.61 CM
DOP CALC LVOT AREA: 3.8 CM2
DOP CALC LVOT DIAMETER: 2.19 CM
DOP CALC LVOT PEAK VEL: 0.65 M/S
DOP CALC LVOT STROKE VOLUME: 42.66 CM3
DOP CALCLVOT PEAK VEL VTI: 11.33 CM
E/E' RATIO: 10.11 M/S
ECHO LV POSTERIOR WALL: 0.81 CM (ref 0.6–1.1)
EJECTION FRACTION: 63 %
FRACTIONAL SHORTENING: 32 % (ref 28–44)
INTERVENTRICULAR SEPTUM: 0.77 CM (ref 0.6–1.1)
IVRT: 68.51 MSEC
LA MAJOR: 5.35 CM
LA MINOR: 5.74 CM
LA WIDTH: 4.45 CM
LEFT ATRIUM SIZE: 3.35 CM
LEFT ATRIUM VOLUME INDEX MOD: 49.6 ML/M2
LEFT ATRIUM VOLUME INDEX: 36.5 ML/M2
LEFT ATRIUM VOLUME MOD: 95.31 CM3
LEFT ATRIUM VOLUME: 70.18 CM3
LEFT INTERNAL DIMENSION IN SYSTOLE: 2.66 CM (ref 2.1–4)
LEFT VENTRICLE DIASTOLIC VOLUME INDEX: 34.2 ML/M2
LEFT VENTRICLE DIASTOLIC VOLUME: 65.66 ML
LEFT VENTRICLE MASS INDEX: 46 G/M2
LEFT VENTRICLE SYSTOLIC VOLUME INDEX: 13.6 ML/M2
LEFT VENTRICLE SYSTOLIC VOLUME: 26.03 ML
LEFT VENTRICULAR INTERNAL DIMENSION IN DIASTOLE: 3.89 CM (ref 3.5–6)
LEFT VENTRICULAR MASS: 87.8 G
LV LATERAL E/E' RATIO: 8 M/S
LV SEPTAL E/E' RATIO: 13.71 M/S
MV PEAK E VEL: 0.96 M/S
PISA TR MAX VEL: 2.3 M/S
RA MAJOR: 5.66 CM
RA PRESSURE: 3 MMHG
RA WIDTH: 3.98 CM
RIGHT VENTRICULAR END-DIASTOLIC DIMENSION: 3.82 CM
RV TISSUE DOPPLER FREE WALL SYSTOLIC VELOCITY 1 (APICAL 4 CHAMBER VIEW): 10.68 CM/S
SINUS: 3.06 CM
STJ: 2.49 CM
TDI LATERAL: 0.12 M/S
TDI SEPTAL: 0.07 M/S
TDI: 0.1 M/S
TR MAX PG: 21 MMHG
TRICUSPID ANNULAR PLANE SYSTOLIC EXCURSION: 1.93 CM
TV REST PULMONARY ARTERY PRESSURE: 24 MMHG

## 2021-07-13 PROCEDURE — 93306 TTE W/DOPPLER COMPLETE: CPT | Mod: 26,,, | Performed by: INTERNAL MEDICINE

## 2021-07-13 PROCEDURE — 93306 TTE W/DOPPLER COMPLETE: CPT

## 2021-07-13 PROCEDURE — 93306 ECHO (CUPID ONLY): ICD-10-PCS | Mod: 26,,, | Performed by: INTERNAL MEDICINE

## 2021-07-19 ENCOUNTER — TELEPHONE (OUTPATIENT)
Dept: ELECTROPHYSIOLOGY | Facility: CLINIC | Age: 76
End: 2021-07-19

## 2021-07-20 ENCOUNTER — PATIENT MESSAGE (OUTPATIENT)
Dept: CARDIOLOGY | Facility: CLINIC | Age: 76
End: 2021-07-20

## 2021-07-20 ENCOUNTER — HOSPITAL ENCOUNTER (OUTPATIENT)
Dept: CARDIOLOGY | Facility: CLINIC | Age: 76
Discharge: HOME | End: 2021-07-20
Payer: MEDICARE

## 2021-07-20 ENCOUNTER — OFFICE VISIT (OUTPATIENT)
Dept: CARDIOLOGY | Facility: CLINIC | Age: 76
End: 2021-07-20
Payer: MEDICARE

## 2021-07-20 VITALS
DIASTOLIC BLOOD PRESSURE: 62 MMHG | SYSTOLIC BLOOD PRESSURE: 106 MMHG | HEIGHT: 65 IN | WEIGHT: 187.81 LBS | HEART RATE: 85 BPM | OXYGEN SATURATION: 99 % | BODY MASS INDEX: 31.29 KG/M2

## 2021-07-20 DIAGNOSIS — I48.19 PERSISTENT ATRIAL FIBRILLATION: ICD-10-CM

## 2021-07-20 DIAGNOSIS — I10 ESSENTIAL HYPERTENSION: ICD-10-CM

## 2021-07-20 DIAGNOSIS — Z86.73 HISTORY OF TIA (TRANSIENT ISCHEMIC ATTACK): Primary | ICD-10-CM

## 2021-07-20 PROCEDURE — 99204 OFFICE O/P NEW MOD 45 MIN: CPT | Mod: 25,S$GLB,, | Performed by: INTERNAL MEDICINE

## 2021-07-20 PROCEDURE — 3074F PR MOST RECENT SYSTOLIC BLOOD PRESSURE < 130 MM HG: ICD-10-PCS | Mod: CPTII,S$GLB,, | Performed by: INTERNAL MEDICINE

## 2021-07-20 PROCEDURE — 99204 PR OFFICE/OUTPT VISIT, NEW, LEVL IV, 45-59 MIN: ICD-10-PCS | Mod: 25,S$GLB,, | Performed by: INTERNAL MEDICINE

## 2021-07-20 PROCEDURE — 1159F MED LIST DOCD IN RCRD: CPT | Mod: CPTII,S$GLB,, | Performed by: INTERNAL MEDICINE

## 2021-07-20 PROCEDURE — 3074F SYST BP LT 130 MM HG: CPT | Mod: CPTII,S$GLB,, | Performed by: INTERNAL MEDICINE

## 2021-07-20 PROCEDURE — 93000 EKG 12-LEAD: ICD-10-PCS | Mod: S$GLB,,, | Performed by: INTERNAL MEDICINE

## 2021-07-20 PROCEDURE — 3078F DIAST BP <80 MM HG: CPT | Mod: CPTII,S$GLB,, | Performed by: INTERNAL MEDICINE

## 2021-07-20 PROCEDURE — 1159F PR MEDICATION LIST DOCUMENTED IN MEDICAL RECORD: ICD-10-PCS | Mod: CPTII,S$GLB,, | Performed by: INTERNAL MEDICINE

## 2021-07-20 PROCEDURE — 93000 ELECTROCARDIOGRAM COMPLETE: CPT | Mod: S$GLB,,, | Performed by: INTERNAL MEDICINE

## 2021-07-20 PROCEDURE — 3078F PR MOST RECENT DIASTOLIC BLOOD PRESSURE < 80 MM HG: ICD-10-PCS | Mod: CPTII,S$GLB,, | Performed by: INTERNAL MEDICINE

## 2021-07-20 PROCEDURE — 99999 PR PBB SHADOW E&M-EST. PATIENT-LVL III: CPT | Mod: PBBFAC,,, | Performed by: INTERNAL MEDICINE

## 2021-07-20 PROCEDURE — 99999 PR PBB SHADOW E&M-EST. PATIENT-LVL III: ICD-10-PCS | Mod: PBBFAC,,, | Performed by: INTERNAL MEDICINE

## 2021-07-22 ENCOUNTER — OFFICE VISIT (OUTPATIENT)
Dept: OBSTETRICS AND GYNECOLOGY | Facility: CLINIC | Age: 76
End: 2021-07-22
Payer: MEDICARE

## 2021-07-22 VITALS
DIASTOLIC BLOOD PRESSURE: 88 MMHG | HEIGHT: 65 IN | BODY MASS INDEX: 31.33 KG/M2 | SYSTOLIC BLOOD PRESSURE: 132 MMHG | WEIGHT: 188.06 LBS

## 2021-07-22 DIAGNOSIS — Z12.31 BREAST CANCER SCREENING BY MAMMOGRAM: ICD-10-CM

## 2021-07-22 DIAGNOSIS — R32 URINARY INCONTINENCE IN FEMALE: ICD-10-CM

## 2021-07-22 DIAGNOSIS — N30.01 ACUTE CYSTITIS WITH HEMATURIA: ICD-10-CM

## 2021-07-22 DIAGNOSIS — Z01.419 WELL WOMAN EXAM WITH ROUTINE GYNECOLOGICAL EXAM: Primary | ICD-10-CM

## 2021-07-22 PROCEDURE — 1126F PR PAIN SEVERITY QUANTIFIED, NO PAIN PRESENT: ICD-10-PCS | Mod: CPTII,S$GLB,, | Performed by: OBSTETRICS & GYNECOLOGY

## 2021-07-22 PROCEDURE — 3079F PR MOST RECENT DIASTOLIC BLOOD PRESSURE 80-89 MM HG: ICD-10-PCS | Mod: CPTII,S$GLB,, | Performed by: OBSTETRICS & GYNECOLOGY

## 2021-07-22 PROCEDURE — 1160F PR REVIEW ALL MEDS BY PRESCRIBER/CLIN PHARMACIST DOCUMENTED: ICD-10-PCS | Mod: CPTII,S$GLB,, | Performed by: OBSTETRICS & GYNECOLOGY

## 2021-07-22 PROCEDURE — 3075F PR MOST RECENT SYSTOLIC BLOOD PRESS GE 130-139MM HG: ICD-10-PCS | Mod: CPTII,S$GLB,, | Performed by: OBSTETRICS & GYNECOLOGY

## 2021-07-22 PROCEDURE — G0101 CA SCREEN;PELVIC/BREAST EXAM: HCPCS | Mod: S$GLB,,, | Performed by: OBSTETRICS & GYNECOLOGY

## 2021-07-22 PROCEDURE — 3044F PR MOST RECENT HEMOGLOBIN A1C LEVEL <7.0%: ICD-10-PCS | Mod: CPTII,S$GLB,, | Performed by: OBSTETRICS & GYNECOLOGY

## 2021-07-22 PROCEDURE — 99999 PR PBB SHADOW E&M-EST. PATIENT-LVL III: CPT | Mod: PBBFAC,,, | Performed by: OBSTETRICS & GYNECOLOGY

## 2021-07-22 PROCEDURE — 1126F AMNT PAIN NOTED NONE PRSNT: CPT | Mod: CPTII,S$GLB,, | Performed by: OBSTETRICS & GYNECOLOGY

## 2021-07-22 PROCEDURE — 1159F MED LIST DOCD IN RCRD: CPT | Mod: CPTII,S$GLB,, | Performed by: OBSTETRICS & GYNECOLOGY

## 2021-07-22 PROCEDURE — 3079F DIAST BP 80-89 MM HG: CPT | Mod: CPTII,S$GLB,, | Performed by: OBSTETRICS & GYNECOLOGY

## 2021-07-22 PROCEDURE — 1101F PR PT FALLS ASSESS DOC 0-1 FALLS W/OUT INJ PAST YR: ICD-10-PCS | Mod: CPTII,S$GLB,, | Performed by: OBSTETRICS & GYNECOLOGY

## 2021-07-22 PROCEDURE — 99999 PR PBB SHADOW E&M-EST. PATIENT-LVL III: ICD-10-PCS | Mod: PBBFAC,,, | Performed by: OBSTETRICS & GYNECOLOGY

## 2021-07-22 PROCEDURE — 1159F PR MEDICATION LIST DOCUMENTED IN MEDICAL RECORD: ICD-10-PCS | Mod: CPTII,S$GLB,, | Performed by: OBSTETRICS & GYNECOLOGY

## 2021-07-22 PROCEDURE — 1160F RVW MEDS BY RX/DR IN RCRD: CPT | Mod: CPTII,S$GLB,, | Performed by: OBSTETRICS & GYNECOLOGY

## 2021-07-22 PROCEDURE — 3288F FALL RISK ASSESSMENT DOCD: CPT | Mod: CPTII,S$GLB,, | Performed by: OBSTETRICS & GYNECOLOGY

## 2021-07-22 PROCEDURE — 3075F SYST BP GE 130 - 139MM HG: CPT | Mod: CPTII,S$GLB,, | Performed by: OBSTETRICS & GYNECOLOGY

## 2021-07-22 PROCEDURE — 3288F PR FALLS RISK ASSESSMENT DOCUMENTED: ICD-10-PCS | Mod: CPTII,S$GLB,, | Performed by: OBSTETRICS & GYNECOLOGY

## 2021-07-22 PROCEDURE — 3044F HG A1C LEVEL LT 7.0%: CPT | Mod: CPTII,S$GLB,, | Performed by: OBSTETRICS & GYNECOLOGY

## 2021-07-22 PROCEDURE — 1101F PT FALLS ASSESS-DOCD LE1/YR: CPT | Mod: CPTII,S$GLB,, | Performed by: OBSTETRICS & GYNECOLOGY

## 2021-07-22 PROCEDURE — G0101 PR CA SCREEN;PELVIC/BREAST EXAM: ICD-10-PCS | Mod: S$GLB,,, | Performed by: OBSTETRICS & GYNECOLOGY

## 2021-07-22 PROCEDURE — 87086 URINE CULTURE/COLONY COUNT: CPT | Performed by: OBSTETRICS & GYNECOLOGY

## 2021-07-22 RX ORDER — SULFAMETHOXAZOLE AND TRIMETHOPRIM 800; 160 MG/1; MG/1
1 TABLET ORAL 2 TIMES DAILY
Qty: 6 TABLET | Refills: 0 | Status: SHIPPED | OUTPATIENT
Start: 2021-07-22 | End: 2021-07-25

## 2021-07-24 LAB — BACTERIA UR CULT: NORMAL

## 2021-07-26 ENCOUNTER — PATIENT MESSAGE (OUTPATIENT)
Dept: OBSTETRICS AND GYNECOLOGY | Facility: CLINIC | Age: 76
End: 2021-07-26

## 2021-07-27 RX ORDER — OXYBUTYNIN CHLORIDE 5 MG/1
5 TABLET, EXTENDED RELEASE ORAL DAILY
Qty: 30 TABLET | Refills: 11 | Status: SHIPPED | OUTPATIENT
Start: 2021-07-27 | End: 2021-12-30

## 2021-08-16 ENCOUNTER — HOSPITAL ENCOUNTER (OUTPATIENT)
Dept: RADIOLOGY | Facility: HOSPITAL | Age: 76
Discharge: HOME | End: 2021-08-16
Attending: INTERNAL MEDICINE
Payer: MEDICARE

## 2021-08-16 ENCOUNTER — OFFICE VISIT (OUTPATIENT)
Dept: FAMILY MEDICINE | Facility: CLINIC | Age: 76
End: 2021-08-16
Payer: MEDICARE

## 2021-08-16 VITALS
HEART RATE: 77 BPM | BODY MASS INDEX: 31.24 KG/M2 | DIASTOLIC BLOOD PRESSURE: 70 MMHG | TEMPERATURE: 98 F | SYSTOLIC BLOOD PRESSURE: 110 MMHG | OXYGEN SATURATION: 97 % | WEIGHT: 187.5 LBS | HEIGHT: 65 IN

## 2021-08-16 DIAGNOSIS — M25.559 HIP PAIN: ICD-10-CM

## 2021-08-16 DIAGNOSIS — Z78.0 OSTEOPENIA AFTER MENOPAUSE: ICD-10-CM

## 2021-08-16 DIAGNOSIS — R73.03 PREDIABETES: ICD-10-CM

## 2021-08-16 DIAGNOSIS — I10 ESSENTIAL HYPERTENSION: ICD-10-CM

## 2021-08-16 DIAGNOSIS — Z86.73 HISTORY OF STROKE: ICD-10-CM

## 2021-08-16 DIAGNOSIS — E66.9 OBESITY (BMI 30-39.9): ICD-10-CM

## 2021-08-16 DIAGNOSIS — Z71.89 ADVANCED DIRECTIVES, COUNSELING/DISCUSSION: ICD-10-CM

## 2021-08-16 DIAGNOSIS — I48.19 PERSISTENT ATRIAL FIBRILLATION: ICD-10-CM

## 2021-08-16 DIAGNOSIS — M85.80 OSTEOPENIA AFTER MENOPAUSE: ICD-10-CM

## 2021-08-16 DIAGNOSIS — Z00.00 ROUTINE MEDICAL EXAM: Primary | ICD-10-CM

## 2021-08-16 DIAGNOSIS — Z78.0 MENOPAUSE: ICD-10-CM

## 2021-08-16 DIAGNOSIS — E78.5 BORDERLINE HYPERLIPIDEMIA: ICD-10-CM

## 2021-08-16 PROCEDURE — 1160F RVW MEDS BY RX/DR IN RCRD: CPT | Mod: CPTII,S$GLB,, | Performed by: INTERNAL MEDICINE

## 2021-08-16 PROCEDURE — 3074F PR MOST RECENT SYSTOLIC BLOOD PRESSURE < 130 MM HG: ICD-10-PCS | Mod: CPTII,S$GLB,, | Performed by: INTERNAL MEDICINE

## 2021-08-16 PROCEDURE — 99397 PR PREVENTIVE VISIT,EST,65 & OVER: ICD-10-PCS | Mod: S$GLB,,, | Performed by: INTERNAL MEDICINE

## 2021-08-16 PROCEDURE — 99999 PR PBB SHADOW E&M-EST. PATIENT-LVL IV: ICD-10-PCS | Mod: PBBFAC,,, | Performed by: INTERNAL MEDICINE

## 2021-08-16 PROCEDURE — 3074F SYST BP LT 130 MM HG: CPT | Mod: CPTII,S$GLB,, | Performed by: INTERNAL MEDICINE

## 2021-08-16 PROCEDURE — 3044F HG A1C LEVEL LT 7.0%: CPT | Mod: CPTII,S$GLB,, | Performed by: INTERNAL MEDICINE

## 2021-08-16 PROCEDURE — 1160F PR REVIEW ALL MEDS BY PRESCRIBER/CLIN PHARMACIST DOCUMENTED: ICD-10-PCS | Mod: CPTII,S$GLB,, | Performed by: INTERNAL MEDICINE

## 2021-08-16 PROCEDURE — 1100F PR PT FALLS ASSESS DOC 2+ FALLS/FALL W/INJURY/YR: ICD-10-PCS | Mod: CPTII,S$GLB,, | Performed by: INTERNAL MEDICINE

## 2021-08-16 PROCEDURE — 73521 X-RAY EXAM HIPS BI 2 VIEWS: CPT | Mod: TC,FY,PO

## 2021-08-16 PROCEDURE — 3044F PR MOST RECENT HEMOGLOBIN A1C LEVEL <7.0%: ICD-10-PCS | Mod: CPTII,S$GLB,, | Performed by: INTERNAL MEDICINE

## 2021-08-16 PROCEDURE — 1159F MED LIST DOCD IN RCRD: CPT | Mod: CPTII,S$GLB,, | Performed by: INTERNAL MEDICINE

## 2021-08-16 PROCEDURE — 99999 PR PBB SHADOW E&M-EST. PATIENT-LVL IV: CPT | Mod: PBBFAC,,, | Performed by: INTERNAL MEDICINE

## 2021-08-16 PROCEDURE — 73521 XR HIPS BILATERAL 2 VIEW INCL AP PELVIS: ICD-10-PCS | Mod: 26,,, | Performed by: RADIOLOGY

## 2021-08-16 PROCEDURE — 3288F PR FALLS RISK ASSESSMENT DOCUMENTED: ICD-10-PCS | Mod: CPTII,S$GLB,, | Performed by: INTERNAL MEDICINE

## 2021-08-16 PROCEDURE — 3078F PR MOST RECENT DIASTOLIC BLOOD PRESSURE < 80 MM HG: ICD-10-PCS | Mod: CPTII,S$GLB,, | Performed by: INTERNAL MEDICINE

## 2021-08-16 PROCEDURE — 1100F PTFALLS ASSESS-DOCD GE2>/YR: CPT | Mod: CPTII,S$GLB,, | Performed by: INTERNAL MEDICINE

## 2021-08-16 PROCEDURE — 73521 X-RAY EXAM HIPS BI 2 VIEWS: CPT | Mod: 26,,, | Performed by: RADIOLOGY

## 2021-08-16 PROCEDURE — 3078F DIAST BP <80 MM HG: CPT | Mod: CPTII,S$GLB,, | Performed by: INTERNAL MEDICINE

## 2021-08-16 PROCEDURE — 3288F FALL RISK ASSESSMENT DOCD: CPT | Mod: CPTII,S$GLB,, | Performed by: INTERNAL MEDICINE

## 2021-08-16 PROCEDURE — 1126F PR PAIN SEVERITY QUANTIFIED, NO PAIN PRESENT: ICD-10-PCS | Mod: CPTII,S$GLB,, | Performed by: INTERNAL MEDICINE

## 2021-08-16 PROCEDURE — 1159F PR MEDICATION LIST DOCUMENTED IN MEDICAL RECORD: ICD-10-PCS | Mod: CPTII,S$GLB,, | Performed by: INTERNAL MEDICINE

## 2021-08-16 PROCEDURE — 99397 PER PM REEVAL EST PAT 65+ YR: CPT | Mod: S$GLB,,, | Performed by: INTERNAL MEDICINE

## 2021-08-16 PROCEDURE — 1126F AMNT PAIN NOTED NONE PRSNT: CPT | Mod: CPTII,S$GLB,, | Performed by: INTERNAL MEDICINE

## 2021-08-16 RX ORDER — ATORVASTATIN CALCIUM 40 MG/1
40 TABLET, FILM COATED ORAL DAILY
Qty: 90 TABLET | Refills: 3 | Status: SHIPPED | OUTPATIENT
Start: 2021-08-16 | End: 2021-09-08 | Stop reason: SDUPTHER

## 2021-08-16 RX ORDER — ATORVASTATIN CALCIUM 40 MG/1
40 TABLET, FILM COATED ORAL DAILY
Qty: 90 TABLET | Refills: 0 | Status: CANCELLED | OUTPATIENT
Start: 2021-08-16 | End: 2021-11-14

## 2021-08-25 ENCOUNTER — PATIENT MESSAGE (OUTPATIENT)
Dept: FAMILY MEDICINE | Facility: CLINIC | Age: 76
End: 2021-08-25

## 2021-08-25 ENCOUNTER — TELEPHONE (OUTPATIENT)
Dept: FAMILY MEDICINE | Facility: CLINIC | Age: 76
End: 2021-08-25

## 2021-09-08 DIAGNOSIS — Z86.79 HISTORY OF HYPERTENSION: ICD-10-CM

## 2021-09-08 DIAGNOSIS — I89.0 LYMPHEDEMA: ICD-10-CM

## 2021-09-08 DIAGNOSIS — E78.5 BORDERLINE HYPERLIPIDEMIA: ICD-10-CM

## 2021-09-08 RX ORDER — AMLODIPINE BESYLATE 5 MG/1
5 TABLET ORAL DAILY
Qty: 90 TABLET | Refills: 2 | Status: SHIPPED | OUTPATIENT
Start: 2021-09-08 | End: 2022-07-01 | Stop reason: SDUPTHER

## 2021-09-08 RX ORDER — ATORVASTATIN CALCIUM 40 MG/1
40 TABLET, FILM COATED ORAL DAILY
Qty: 90 TABLET | Refills: 2 | Status: SHIPPED | OUTPATIENT
Start: 2021-09-08 | End: 2021-12-30

## 2021-09-08 RX ORDER — FUROSEMIDE 20 MG/1
20 TABLET ORAL 2 TIMES DAILY PRN
Qty: 90 TABLET | Refills: 2 | Status: SHIPPED | OUTPATIENT
Start: 2021-09-08

## 2021-09-24 ENCOUNTER — PATIENT MESSAGE (OUTPATIENT)
Dept: CARDIOLOGY | Facility: CLINIC | Age: 76
End: 2021-09-24

## 2021-10-14 ENCOUNTER — HOSPITAL ENCOUNTER (OUTPATIENT)
Dept: RADIOLOGY | Facility: OTHER | Age: 76
Discharge: HOME | End: 2021-10-14
Attending: INTERNAL MEDICINE
Payer: MEDICARE

## 2021-10-14 ENCOUNTER — HOSPITAL ENCOUNTER (OUTPATIENT)
Dept: RADIOLOGY | Facility: HOSPITAL | Age: 76
Discharge: HOME | End: 2021-10-14
Attending: OBSTETRICS & GYNECOLOGY
Payer: MEDICARE

## 2021-10-14 ENCOUNTER — CLINICAL SUPPORT (OUTPATIENT)
Dept: FAMILY MEDICINE | Facility: CLINIC | Age: 76
End: 2021-10-14
Payer: MEDICARE

## 2021-10-14 DIAGNOSIS — Z78.0 MENOPAUSE: ICD-10-CM

## 2021-10-14 DIAGNOSIS — Z01.419 WELL WOMAN EXAM WITH ROUTINE GYNECOLOGICAL EXAM: ICD-10-CM

## 2021-10-14 DIAGNOSIS — Z23 INFLUENZA VACCINE ADMINISTERED: Primary | ICD-10-CM

## 2021-10-14 DIAGNOSIS — Z12.31 BREAST CANCER SCREENING BY MAMMOGRAM: ICD-10-CM

## 2021-10-14 PROCEDURE — 90694 FLU VACCINE - QUADRIVALENT - ADJUVANTED: ICD-10-PCS | Mod: HCNC,S$GLB,, | Performed by: INTERNAL MEDICINE

## 2021-10-14 PROCEDURE — 77063 MAMMO DIGITAL SCREENING BILAT WITH TOMO: ICD-10-PCS | Mod: 26,HCNC,, | Performed by: RADIOLOGY

## 2021-10-14 PROCEDURE — 77080 DXA BONE DENSITY AXIAL: CPT | Mod: TC,HCNC

## 2021-10-14 PROCEDURE — G0008 ADMIN INFLUENZA VIRUS VAC: HCPCS | Mod: HCNC,S$GLB,, | Performed by: INTERNAL MEDICINE

## 2021-10-14 PROCEDURE — 90694 VACC AIIV4 NO PRSRV 0.5ML IM: CPT | Mod: HCNC,S$GLB,, | Performed by: INTERNAL MEDICINE

## 2021-10-14 PROCEDURE — G0008 FLU VACCINE - QUADRIVALENT - ADJUVANTED: ICD-10-PCS | Mod: HCNC,S$GLB,, | Performed by: INTERNAL MEDICINE

## 2021-10-14 PROCEDURE — 99999 PR PBB SHADOW E&M-EST. PATIENT-LVL I: CPT | Mod: PBBFAC,HCNC,,

## 2021-10-14 PROCEDURE — 77067 MAMMO DIGITAL SCREENING BILAT WITH TOMO: ICD-10-PCS | Mod: 26,HCNC,, | Performed by: RADIOLOGY

## 2021-10-14 PROCEDURE — 77080 DEXA BONE DENSITY SPINE HIP: ICD-10-PCS | Mod: 26,HCNC,, | Performed by: STUDENT IN AN ORGANIZED HEALTH CARE EDUCATION/TRAINING PROGRAM

## 2021-10-14 PROCEDURE — 77067 SCR MAMMO BI INCL CAD: CPT | Mod: TC,HCNC,PO

## 2021-10-14 PROCEDURE — 77067 SCR MAMMO BI INCL CAD: CPT | Mod: 26,HCNC,, | Performed by: RADIOLOGY

## 2021-10-14 PROCEDURE — 77063 BREAST TOMOSYNTHESIS BI: CPT | Mod: 26,HCNC,, | Performed by: RADIOLOGY

## 2021-10-14 PROCEDURE — 77080 DXA BONE DENSITY AXIAL: CPT | Mod: 26,HCNC,, | Performed by: STUDENT IN AN ORGANIZED HEALTH CARE EDUCATION/TRAINING PROGRAM

## 2021-10-14 PROCEDURE — 99999 PR PBB SHADOW E&M-EST. PATIENT-LVL I: ICD-10-PCS | Mod: PBBFAC,HCNC,,

## 2021-10-21 ENCOUNTER — TELEPHONE (OUTPATIENT)
Dept: FAMILY MEDICINE | Facility: CLINIC | Age: 76
End: 2021-10-21

## 2021-10-22 ENCOUNTER — TELEPHONE (OUTPATIENT)
Dept: SPORTS MEDICINE | Facility: CLINIC | Age: 76
End: 2021-10-22

## 2021-12-19 ENCOUNTER — PATIENT MESSAGE (OUTPATIENT)
Dept: ADMINISTRATIVE | Facility: OTHER | Age: 76
End: 2021-12-19
Payer: MEDICARE

## 2022-01-03 ENCOUNTER — PATIENT MESSAGE (OUTPATIENT)
Dept: ADMINISTRATIVE | Facility: OTHER | Age: 77
End: 2022-01-03
Payer: MEDICARE

## 2022-01-03 ENCOUNTER — LAB VISIT (OUTPATIENT)
Dept: LAB | Facility: HOSPITAL | Age: 77
End: 2022-01-03
Attending: INTERNAL MEDICINE
Payer: MEDICARE

## 2022-01-03 DIAGNOSIS — E78.5 BORDERLINE HYPERLIPIDEMIA: ICD-10-CM

## 2022-01-03 LAB
CHOLEST SERPL-MCNC: 116 MG/DL (ref 120–199)
CHOLEST/HDLC SERPL: 2.8 {RATIO} (ref 2–5)
HDLC SERPL-MCNC: 42 MG/DL (ref 40–75)
HDLC SERPL: 36.2 % (ref 20–50)
LDLC SERPL CALC-MCNC: 60.6 MG/DL (ref 63–159)
NONHDLC SERPL-MCNC: 74 MG/DL
TRIGL SERPL-MCNC: 67 MG/DL (ref 30–150)

## 2022-01-03 PROCEDURE — 36415 COLL VENOUS BLD VENIPUNCTURE: CPT | Mod: HCNC,PO | Performed by: INTERNAL MEDICINE

## 2022-01-03 PROCEDURE — 80061 LIPID PANEL: CPT | Mod: HCNC | Performed by: INTERNAL MEDICINE

## 2022-01-06 ENCOUNTER — PATIENT MESSAGE (OUTPATIENT)
Dept: FAMILY MEDICINE | Facility: CLINIC | Age: 77
End: 2022-01-06
Payer: MEDICARE

## 2022-01-07 ENCOUNTER — PATIENT MESSAGE (OUTPATIENT)
Dept: NEUROLOGY | Facility: CLINIC | Age: 77
End: 2022-01-07
Payer: MEDICARE

## 2022-01-07 RX ORDER — GABAPENTIN 100 MG/1
100 CAPSULE ORAL DAILY PRN
COMMUNITY
End: 2022-07-01 | Stop reason: SDUPTHER

## 2022-01-07 NOTE — TELEPHONE ENCOUNTER
Spoke to pt who stated she wants to resume her gabapentin Rx from a previous provider, neurologist, Dr. Thien Lujan.   -It was discontinued in July 2021 by Dr. Lujan.     Added medication to medication list with information pt provided on how she was actually taking the medication.  Pt states she only takes it at night every day for trouble sleeping..    I told her to notify Dr. Lujan since she was prescribed this medication from him and he needs to refill the medication, as well as be notified of her taking it again. Pt stated she didn't notify him yet.     Pt also stated she's only been taking one a day because she's so low on the medication.

## 2022-01-11 DIAGNOSIS — I48.0 PAROXYSMAL ATRIAL FIBRILLATION: ICD-10-CM

## 2022-01-11 RX ORDER — GABAPENTIN 100 MG/1
100 CAPSULE ORAL 3 TIMES DAILY
Qty: 90 CAPSULE | Refills: 11 | Status: SHIPPED | OUTPATIENT
Start: 2022-01-11 | End: 2023-12-20

## 2022-03-13 ENCOUNTER — PATIENT MESSAGE (OUTPATIENT)
Dept: FAMILY MEDICINE | Facility: CLINIC | Age: 77
End: 2022-03-13
Payer: MEDICARE

## 2022-03-17 ENCOUNTER — PATIENT MESSAGE (OUTPATIENT)
Dept: CARDIOLOGY | Facility: CLINIC | Age: 77
End: 2022-03-17
Payer: MEDICARE

## 2022-03-26 ENCOUNTER — PATIENT MESSAGE (OUTPATIENT)
Dept: FAMILY MEDICINE | Facility: CLINIC | Age: 77
End: 2022-03-26
Payer: MEDICARE

## 2022-03-26 ENCOUNTER — PATIENT MESSAGE (OUTPATIENT)
Dept: CARDIOLOGY | Facility: CLINIC | Age: 77
End: 2022-03-26
Payer: MEDICARE

## 2022-03-29 ENCOUNTER — PATIENT MESSAGE (OUTPATIENT)
Dept: CARDIOLOGY | Facility: CLINIC | Age: 77
End: 2022-03-29
Payer: MEDICARE

## 2022-04-06 ENCOUNTER — PATIENT MESSAGE (OUTPATIENT)
Dept: FAMILY MEDICINE | Facility: CLINIC | Age: 77
End: 2022-04-06
Payer: MEDICARE

## 2022-04-06 DIAGNOSIS — L29.9 PRURITUS: Primary | ICD-10-CM

## 2022-04-06 NOTE — TELEPHONE ENCOUNTER
Patient is having extreme itching and want to know what do you recommend , she already tried levocetirizine which didn't help she thinks it could be an allergic reaction to a medication. Please advise

## 2022-04-29 ENCOUNTER — PATIENT MESSAGE (OUTPATIENT)
Dept: CARDIOLOGY | Facility: CLINIC | Age: 77
End: 2022-04-29
Payer: MEDICARE

## 2022-04-29 ENCOUNTER — TELEPHONE (OUTPATIENT)
Dept: CARDIOLOGY | Facility: CLINIC | Age: 77
End: 2022-04-29
Payer: MEDICARE

## 2022-04-29 DIAGNOSIS — I73.9 CLAUDICATION: Primary | ICD-10-CM

## 2022-05-05 ENCOUNTER — PATIENT MESSAGE (OUTPATIENT)
Dept: CARDIOLOGY | Facility: CLINIC | Age: 77
End: 2022-05-05
Payer: MEDICARE

## 2022-05-06 ENCOUNTER — HOSPITAL ENCOUNTER (OUTPATIENT)
Dept: CARDIOLOGY | Facility: HOSPITAL | Age: 77
Discharge: HOME | End: 2022-05-06
Attending: INTERNAL MEDICINE
Payer: MEDICARE

## 2022-05-06 DIAGNOSIS — I73.9 CLAUDICATION: ICD-10-CM

## 2022-05-06 LAB
IMMEDIATE ARM BP: 174 MMHG
IMMEDIATE LEFT ABI: 1.06
IMMEDIATE LEFT TIBIAL: 184 MMHG
IMMEDIATE RIGHT ABI: 1.15
IMMEDIATE RIGHT TIBIAL: 200 MMHG
LEFT ABI: 0.96
LEFT ARM BP: 159 MMHG
LEFT DORSALIS PEDIS: 151 MMHG
LEFT POSTERIOR TIBIAL: 153 MMHG
RIGHT ABI: 1.03
RIGHT ARM BP: 150 MMHG
RIGHT DORSALIS PEDIS: 153 MMHG
RIGHT POSTERIOR TIBIAL: 163 MMHG
TREADMILL GRADE: 12 %
TREADMILL SPEED: 2 MPH
TREADMILL TIME: 5 MIN

## 2022-05-06 PROCEDURE — 93924 ANKLE BRACHIAL INDICES (ABI): ICD-10-PCS | Mod: 26,,, | Performed by: INTERNAL MEDICINE

## 2022-05-06 PROCEDURE — 93924 LWR XTR VASC STDY BILAT: CPT | Mod: 26,,, | Performed by: INTERNAL MEDICINE

## 2022-05-06 PROCEDURE — 93924 LWR XTR VASC STDY BILAT: CPT

## 2022-05-10 ENCOUNTER — PATIENT MESSAGE (OUTPATIENT)
Dept: CARDIOLOGY | Facility: CLINIC | Age: 77
End: 2022-05-10
Payer: MEDICARE

## 2022-05-17 ENCOUNTER — PATIENT MESSAGE (OUTPATIENT)
Dept: ALLERGY | Facility: CLINIC | Age: 77
End: 2022-05-17
Payer: MEDICARE

## 2022-06-15 ENCOUNTER — PATIENT MESSAGE (OUTPATIENT)
Dept: FAMILY MEDICINE | Facility: CLINIC | Age: 77
End: 2022-06-15
Payer: MEDICARE

## 2022-06-15 DIAGNOSIS — R73.03 PREDIABETES: Primary | ICD-10-CM

## 2022-06-15 DIAGNOSIS — I10 ESSENTIAL HYPERTENSION: ICD-10-CM

## 2022-06-15 DIAGNOSIS — Z00.00 ROUTINE MEDICAL EXAM: ICD-10-CM

## 2022-06-16 ENCOUNTER — PATIENT MESSAGE (OUTPATIENT)
Dept: FAMILY MEDICINE | Facility: CLINIC | Age: 77
End: 2022-06-16
Payer: MEDICARE

## 2022-06-27 ENCOUNTER — LAB VISIT (OUTPATIENT)
Dept: LAB | Facility: HOSPITAL | Age: 77
End: 2022-06-27
Attending: INTERNAL MEDICINE
Payer: MEDICARE

## 2022-06-27 ENCOUNTER — PATIENT MESSAGE (OUTPATIENT)
Dept: FAMILY MEDICINE | Facility: CLINIC | Age: 77
End: 2022-06-27
Payer: MEDICARE

## 2022-06-27 DIAGNOSIS — Z00.00 ROUTINE MEDICAL EXAM: ICD-10-CM

## 2022-06-27 DIAGNOSIS — E78.5 BORDERLINE HYPERLIPIDEMIA: Primary | ICD-10-CM

## 2022-06-27 LAB
ALBUMIN SERPL BCP-MCNC: 3.6 G/DL (ref 3.5–5.2)
ALP SERPL-CCNC: 60 U/L (ref 55–135)
ALT SERPL W/O P-5'-P-CCNC: 11 U/L (ref 10–44)
ANION GAP SERPL CALC-SCNC: 8 MMOL/L (ref 8–16)
AST SERPL-CCNC: 16 U/L (ref 10–40)
BASOPHILS # BLD AUTO: 0.03 K/UL (ref 0–0.2)
BASOPHILS NFR BLD: 0.4 % (ref 0–1.9)
BILIRUB SERPL-MCNC: 0.7 MG/DL (ref 0.1–1)
BUN SERPL-MCNC: 19 MG/DL (ref 8–23)
CALCIUM SERPL-MCNC: 9.5 MG/DL (ref 8.7–10.5)
CHLORIDE SERPL-SCNC: 106 MMOL/L (ref 95–110)
CHOLEST SERPL-MCNC: 126 MG/DL (ref 120–199)
CHOLEST/HDLC SERPL: 2.7 {RATIO} (ref 2–5)
CO2 SERPL-SCNC: 26 MMOL/L (ref 23–29)
CREAT SERPL-MCNC: 0.7 MG/DL (ref 0.5–1.4)
DIFFERENTIAL METHOD: ABNORMAL
EOSINOPHIL # BLD AUTO: 0.3 K/UL (ref 0–0.5)
EOSINOPHIL NFR BLD: 4.2 % (ref 0–8)
ERYTHROCYTE [DISTWIDTH] IN BLOOD BY AUTOMATED COUNT: 12.5 % (ref 11.5–14.5)
EST. GFR  (AFRICAN AMERICAN): >60 ML/MIN/1.73 M^2
EST. GFR  (NON AFRICAN AMERICAN): >60 ML/MIN/1.73 M^2
ESTIMATED AVG GLUCOSE: 111 MG/DL (ref 68–131)
GLUCOSE SERPL-MCNC: 98 MG/DL (ref 70–110)
HBA1C MFR BLD: 5.5 % (ref 4–5.6)
HCT VFR BLD AUTO: 47.2 % (ref 37–48.5)
HDLC SERPL-MCNC: 46 MG/DL (ref 40–75)
HDLC SERPL: 36.5 % (ref 20–50)
HGB BLD-MCNC: 14.6 G/DL (ref 12–16)
IMM GRANULOCYTES # BLD AUTO: 0.01 K/UL (ref 0–0.04)
IMM GRANULOCYTES NFR BLD AUTO: 0.1 % (ref 0–0.5)
LDLC SERPL CALC-MCNC: 66.8 MG/DL (ref 63–159)
LYMPHOCYTES # BLD AUTO: 2 K/UL (ref 1–4.8)
LYMPHOCYTES NFR BLD: 26.8 % (ref 18–48)
MCH RBC QN AUTO: 31.1 PG (ref 27–31)
MCHC RBC AUTO-ENTMCNC: 30.9 G/DL (ref 32–36)
MCV RBC AUTO: 101 FL (ref 82–98)
MONOCYTES # BLD AUTO: 0.5 K/UL (ref 0.3–1)
MONOCYTES NFR BLD: 7.1 % (ref 4–15)
NEUTROPHILS # BLD AUTO: 4.5 K/UL (ref 1.8–7.7)
NEUTROPHILS NFR BLD: 61.4 % (ref 38–73)
NONHDLC SERPL-MCNC: 80 MG/DL
NRBC BLD-RTO: 0 /100 WBC
PLATELET # BLD AUTO: 218 K/UL (ref 150–450)
PMV BLD AUTO: 10.8 FL (ref 9.2–12.9)
POTASSIUM SERPL-SCNC: 4.6 MMOL/L (ref 3.5–5.1)
PROT SERPL-MCNC: 7 G/DL (ref 6–8.4)
RBC # BLD AUTO: 4.69 M/UL (ref 4–5.4)
SODIUM SERPL-SCNC: 140 MMOL/L (ref 136–145)
TRIGL SERPL-MCNC: 66 MG/DL (ref 30–150)
WBC # BLD AUTO: 7.32 K/UL (ref 3.9–12.7)

## 2022-06-27 PROCEDURE — 80053 COMPREHEN METABOLIC PANEL: CPT | Performed by: INTERNAL MEDICINE

## 2022-06-27 PROCEDURE — 83036 HEMOGLOBIN GLYCOSYLATED A1C: CPT | Performed by: INTERNAL MEDICINE

## 2022-06-27 PROCEDURE — 85025 COMPLETE CBC W/AUTO DIFF WBC: CPT | Performed by: INTERNAL MEDICINE

## 2022-06-27 PROCEDURE — 80061 LIPID PANEL: CPT | Performed by: INTERNAL MEDICINE

## 2022-06-27 PROCEDURE — 36415 COLL VENOUS BLD VENIPUNCTURE: CPT | Mod: PO | Performed by: INTERNAL MEDICINE

## 2022-06-27 NOTE — TELEPHONE ENCOUNTER
Informed pt of lipid monitoring program advice from Dr. Rivers. Doesn't need a repeat for a year.

## 2022-06-27 NOTE — TELEPHONE ENCOUNTER
Called [facility] lab to add on FLP to recent labs drawn. Lab stated they'd add it on.

## 2022-07-01 ENCOUNTER — OFFICE VISIT (OUTPATIENT)
Dept: FAMILY MEDICINE | Facility: CLINIC | Age: 77
End: 2022-07-01
Payer: MEDICARE

## 2022-07-01 VITALS
OXYGEN SATURATION: 96 % | HEIGHT: 65 IN | DIASTOLIC BLOOD PRESSURE: 80 MMHG | BODY MASS INDEX: 30.62 KG/M2 | HEART RATE: 90 BPM | SYSTOLIC BLOOD PRESSURE: 128 MMHG | TEMPERATURE: 99 F | WEIGHT: 183.75 LBS

## 2022-07-01 DIAGNOSIS — M25.552 LEFT HIP PAIN: ICD-10-CM

## 2022-07-01 DIAGNOSIS — Z86.73 HISTORY OF STROKE: ICD-10-CM

## 2022-07-01 DIAGNOSIS — E78.5 BORDERLINE HYPERLIPIDEMIA: ICD-10-CM

## 2022-07-01 DIAGNOSIS — L50.3 DERMATOGRAPHISM: ICD-10-CM

## 2022-07-01 DIAGNOSIS — I48.19 PERSISTENT ATRIAL FIBRILLATION: ICD-10-CM

## 2022-07-01 DIAGNOSIS — Z78.0 OSTEOPENIA AFTER MENOPAUSE: ICD-10-CM

## 2022-07-01 DIAGNOSIS — M85.80 OSTEOPENIA AFTER MENOPAUSE: ICD-10-CM

## 2022-07-01 DIAGNOSIS — I10 ESSENTIAL HYPERTENSION: ICD-10-CM

## 2022-07-01 DIAGNOSIS — E66.9 OBESITY (BMI 30-39.9): ICD-10-CM

## 2022-07-01 DIAGNOSIS — Z00.00 ROUTINE MEDICAL EXAM: Primary | ICD-10-CM

## 2022-07-01 DIAGNOSIS — R73.03 PREDIABETES: ICD-10-CM

## 2022-07-01 PROCEDURE — 3079F DIAST BP 80-89 MM HG: CPT | Mod: CPTII,S$GLB,, | Performed by: INTERNAL MEDICINE

## 2022-07-01 PROCEDURE — 3288F FALL RISK ASSESSMENT DOCD: CPT | Mod: CPTII,S$GLB,, | Performed by: INTERNAL MEDICINE

## 2022-07-01 PROCEDURE — 1159F PR MEDICATION LIST DOCUMENTED IN MEDICAL RECORD: ICD-10-PCS | Mod: CPTII,S$GLB,, | Performed by: INTERNAL MEDICINE

## 2022-07-01 PROCEDURE — 1159F MED LIST DOCD IN RCRD: CPT | Mod: CPTII,S$GLB,, | Performed by: INTERNAL MEDICINE

## 2022-07-01 PROCEDURE — 1160F PR REVIEW ALL MEDS BY PRESCRIBER/CLIN PHARMACIST DOCUMENTED: ICD-10-PCS | Mod: CPTII,S$GLB,, | Performed by: INTERNAL MEDICINE

## 2022-07-01 PROCEDURE — 99397 PR PREVENTIVE VISIT,EST,65 & OVER: ICD-10-PCS | Mod: S$GLB,,, | Performed by: INTERNAL MEDICINE

## 2022-07-01 PROCEDURE — 1160F RVW MEDS BY RX/DR IN RCRD: CPT | Mod: CPTII,S$GLB,, | Performed by: INTERNAL MEDICINE

## 2022-07-01 PROCEDURE — 1125F PR PAIN SEVERITY QUANTIFIED, PAIN PRESENT: ICD-10-PCS | Mod: CPTII,S$GLB,, | Performed by: INTERNAL MEDICINE

## 2022-07-01 PROCEDURE — 1125F AMNT PAIN NOTED PAIN PRSNT: CPT | Mod: CPTII,S$GLB,, | Performed by: INTERNAL MEDICINE

## 2022-07-01 PROCEDURE — 3288F PR FALLS RISK ASSESSMENT DOCUMENTED: ICD-10-PCS | Mod: CPTII,S$GLB,, | Performed by: INTERNAL MEDICINE

## 2022-07-01 PROCEDURE — 99397 PER PM REEVAL EST PAT 65+ YR: CPT | Mod: S$GLB,,, | Performed by: INTERNAL MEDICINE

## 2022-07-01 PROCEDURE — 1101F PT FALLS ASSESS-DOCD LE1/YR: CPT | Mod: CPTII,S$GLB,, | Performed by: INTERNAL MEDICINE

## 2022-07-01 PROCEDURE — 3079F PR MOST RECENT DIASTOLIC BLOOD PRESSURE 80-89 MM HG: ICD-10-PCS | Mod: CPTII,S$GLB,, | Performed by: INTERNAL MEDICINE

## 2022-07-01 PROCEDURE — 99999 PR PBB SHADOW E&M-EST. PATIENT-LVL III: ICD-10-PCS | Mod: PBBFAC,,, | Performed by: INTERNAL MEDICINE

## 2022-07-01 PROCEDURE — 3074F PR MOST RECENT SYSTOLIC BLOOD PRESSURE < 130 MM HG: ICD-10-PCS | Mod: CPTII,S$GLB,, | Performed by: INTERNAL MEDICINE

## 2022-07-01 PROCEDURE — 99999 PR PBB SHADOW E&M-EST. PATIENT-LVL III: CPT | Mod: PBBFAC,,, | Performed by: INTERNAL MEDICINE

## 2022-07-01 PROCEDURE — 3074F SYST BP LT 130 MM HG: CPT | Mod: CPTII,S$GLB,, | Performed by: INTERNAL MEDICINE

## 2022-07-01 PROCEDURE — 1101F PR PT FALLS ASSESS DOC 0-1 FALLS W/OUT INJ PAST YR: ICD-10-PCS | Mod: CPTII,S$GLB,, | Performed by: INTERNAL MEDICINE

## 2022-07-01 RX ORDER — FUROSEMIDE 20 MG/1
20 TABLET ORAL 2 TIMES DAILY PRN
Qty: 90 TABLET | Refills: 2 | Status: CANCELLED | OUTPATIENT
Start: 2022-07-01

## 2022-07-01 RX ORDER — ATORVASTATIN CALCIUM 20 MG/1
20 TABLET, FILM COATED ORAL DAILY
Qty: 90 TABLET | Refills: 2 | Status: SHIPPED | OUTPATIENT
Start: 2022-07-01 | End: 2023-02-15

## 2022-07-01 RX ORDER — AMLODIPINE BESYLATE 5 MG/1
5 TABLET ORAL DAILY
Qty: 90 TABLET | Refills: 2 | Status: SHIPPED | OUTPATIENT
Start: 2022-07-01 | End: 2023-02-15

## 2022-07-01 RX ORDER — GABAPENTIN 100 MG/1
100 CAPSULE ORAL 3 TIMES DAILY
Qty: 90 CAPSULE | Refills: 11 | Status: CANCELLED | OUTPATIENT
Start: 2022-07-01 | End: 2023-07-01

## 2022-07-01 NOTE — PROGRESS NOTES
HISTORY OF PRESENT ILLNESS:  Gale Fernández is a 77 y.o. female who presents to the clinic today for a routine physical exam. Her last physical exam was approximately 1 years(s) ago.        PAST MEDICAL HISTORY:  Past Medical History:   Diagnosis Date    Atrial fibrillation     Followed by EP cardiology    Borderline hyperlipidemia     Cataract     History of abnormal Pap smear     More than 20 years ago; status post colposcopy    History of anxiety disorder     History of hypertension     Currently doing okay off medication    Hypertension     Osteopenia     Refuses medication    Overweight(278.02)     Stroke 2019       PAST SURGICAL HISTORY:  Past Surgical History:   Procedure Laterality Date     SECTION, LOW TRANSVERSE      X1    COLONOSCOPY N/A 2020    Procedure: COLONOSCOPY;  Surgeon: Maicol Johnson MD;  Location: [facility] ([address] [place] [address]);  Service: Endoscopy;  Laterality: N/A;  Covid test on . Pt. holding Xarelto for 2 days prior to.EC    left knee meniscus surgery      neck tuck and liposuction      PSVT ablation      RADIOFREQUENCY ABLATION      Skin cancer removal on the left eye         SOCIAL HISTORY:  Social History     Socioeconomic History    Marital status:     Number of children: 1   Occupational History    Occupation: Teacher   Tobacco Use    Smoking status: Former Smoker     Types: Cigarettes    Smokeless tobacco: Never Used   Substance and Sexual Activity    Alcohol use: Yes     Comment: Rarely    Drug use: No       FAMILY HISTORY:  Family History   Problem Relation Age of Onset    Heart disease Mother     Cancer Father         nasopharynx    Breast cancer Cousin     Cancer Cousin         colon cancer    Hypertension Sister     Diabetes Sister     Cancer Sister     Ovarian cancer Sister     Coronary artery disease Brother         s/p stenting    Hypertension Brother     Rheum arthritis Sister     Hypertension Brother     Hypertension  Brother     Heart disease Brother         s/p CABG    Hypertension Brother     Coronary artery disease Brother         s/p stenting    Cancer Brother     No Known Problems Maternal Grandmother     No Known Problems Maternal Grandfather     No Known Problems Paternal Grandmother     No Known Problems Paternal Grandfather     No Known Problems Maternal Aunt     No Known Problems Maternal Uncle     No Known Problems Paternal Aunt     No Known Problems Paternal Uncle     Colon cancer Neg Hx     Amblyopia Neg Hx     Blindness Neg Hx     Cataracts Neg Hx     Glaucoma Neg Hx     Macular degeneration Neg Hx     Retinal detachment Neg Hx     Strabismus Neg Hx     Stroke Neg Hx     Thyroid disease Neg Hx        ALLERGIES AND MEDICATIONS: updated and reviewed.  Review of patient's allergies indicates:   Allergen Reactions    Flecainide Other (See Comments)     Other reaction(s): worsening arrythmia    Multaq [dronedarone]      rash     Medication List with Changes/Refills   Current Medications    FUROSEMIDE (LASIX) 20 MG TABLET    Take 1 tablet (20 mg total) by mouth 2 (two) times daily as needed (Leg Swelling).    GABAPENTIN (NEURONTIN) 100 MG CAPSULE    Take 100 mg by mouth daily as needed.    GABAPENTIN (NEURONTIN) 100 MG CAPSULE    Take 1 capsule (100 mg total) by mouth 3 (three) times daily.   Changed and/or Refilled Medications    Modified Medication Previous Medication    AMLODIPINE (NORVASC) 5 MG TABLET amLODIPine (NORVASC) 5 MG tablet       Take 1 tablet (5 mg total) by mouth once daily.    Take 1 tablet (5 mg total) by mouth once daily.    ATORVASTATIN (LIPITOR) 20 MG TABLET atorvastatin (LIPITOR) 20 MG tablet       Take 1 tablet (20 mg total) by mouth once daily.    Take 1 tablet (20 mg total) by mouth once daily.    RIVAROXABAN (XARELTO) 20 MG TAB rivaroxaban (XARELTO) 20 mg Tab       Take 1 tablet (20 mg total) by mouth every evening.    Take 1 tablet (20 mg total) by mouth every evening.          CARE TEAM:  Patient Care Team:  Vickie Hurtado MD as PCP - General (Internal Medicine)  Helga Mcmullen LPN as Licensed Practical Nurse  Jose Eduardo Stanley MD as Consulting Physician (Electrophysiology)  Radha Banuelos as Digital Medicine Health   India Dumont PharmD as Hyperlipidemia Digital Medicine Clinician  Vickie Hurtado MD as Hyperlipidemia Digital Medicine Responsible Provider (Internal Medicine)  [place] Dumont PharmD as Hypertension Digital Medicine Clinician (Pharmacist)  Vickie Hurtado MD as Hypertension Digital Medicine Responsible Provider (Internal Medicine)  [employer] as Hypertension Digital Medicine Contract           SCREENING HISTORY:  Health Maintenance       Date Due Completion Date    COVID-19 Vaccine (4 - Booster for Moderna series) 01/25/2022 10/25/2021    Influenza Vaccine (1) 09/01/2022 10/14/2021    Override on 9/1/2020: Done    Lipid Panel 06/27/2023 6/27/2022    Hemoglobin A1c 06/27/2023 6/27/2022    DEXA Scan 10/14/2023 10/14/2021    Override on 7/7/2011: Done    TETANUS VACCINE 06/06/2026 6/6/2016    Colonoscopy 08/21/2030 8/21/2020    Override on 3/10/2015: Declined            REVIEW OF SYSTEMS:   The patient reports: fair dietary habits.  The patient reports : that they do not exercise regularly  Review of Systems   Constitutional: Negative for chills and fever.   Respiratory: Negative for shortness of breath.    Cardiovascular: Negative for chest pain and palpitations.   Gastrointestinal: Negative for abdominal pain.   Genitourinary: Negative for dysuria.   Musculoskeletal: Positive for arthralgias (- mostly in left hip) and back pain (- some relief with yoga, but not consistent in doing the exercises).   Skin:        She reports that she has been having a 'rash' - she gets lines on her skin when she leans against things like a shopping cart.      ROS (Optional)-: no pelvic pain  Breast ROS (Optional)-: negative for breast  "lumps/discharge      Physical Examination: 274}  Vitals:    07/01/22 1314   BP: 128/80   Pulse: 90   Temp: 98.6 °F (37 °C)     Weight: 83.3 kg (183 lb 12.1 oz)   Height: 5' 5" (165.1 cm)   Body mass index is 30.58 kg/m².      Patient did not require to have a chaperone present during the exam today.    General appearance - alert, well appearing, and in no distress, obese  Psychiatric - alert, oriented to person, place, and time, normal behavior, speech, dress, motor activity and thought process  Eyes - pupils equal and reactive, extraocular eye movements intact, sclera anicteric  Mouth - not examined  Neck - supple, no significant adenopathy, carotids upstroke normal bilaterally, no bruits  Lymphatics - no palpable cervical lymphadenopathy  Chest - clear to auscultation, no wheezes, rales or rhonchi, symmetric air entry  Heart - irregularly irregular rhythm with rate controlled   Back exam - not examined  Neurological - alert, normal speech, no focal findings, cranial nerves II through XII intact  Musculoskeletal - patient noted to have Mild-Moderate osteoarthritic changes to both knee joints. No joint effusions noted., no muscular tenderness noted  Extremities - no pedal edema noted  Skin - normal coloration, no suspicious skin lesions      Labs:  Results for orders placed or performed in visit on 06/27/22   Comprehensive Metabolic Panel   Result Value Ref Range    Sodium 140 136 - 145 mmol/L    Potassium 4.6 3.5 - 5.1 mmol/L    Chloride 106 95 - 110 mmol/L    CO2 26 23 - 29 mmol/L    Glucose 98 70 - 110 mg/dL    BUN 19 8 - 23 mg/dL    Creatinine 0.7 0.5 - 1.4 mg/dL    Calcium 9.5 8.7 - 10.5 mg/dL    Total Protein 7.0 6.0 - 8.4 g/dL    Albumin 3.6 3.5 - 5.2 g/dL    Total Bilirubin 0.7 0.1 - 1.0 mg/dL    Alkaline Phosphatase 60 55 - 135 U/L    AST 16 10 - 40 U/L    ALT 11 10 - 44 U/L    Anion Gap 8 8 - 16 mmol/L    eGFR if African American >60.0 >60 mL/min/1.73 m^2    eGFR if non African American >60.0 >60 " mL/min/1.73 m^2   CBC Auto Differential   Result Value Ref Range    WBC 7.32 3.90 - 12.70 K/uL    RBC 4.69 4.00 - 5.40 M/uL    Hemoglobin 14.6 12.0 - 16.0 g/dL    Hematocrit 47.2 37.0 - 48.5 %     (H) 82 - 98 fL    MCH 31.1 (H) 27.0 - 31.0 pg    MCHC 30.9 (L) 32.0 - 36.0 g/dL    RDW 12.5 11.5 - 14.5 %    Platelets 218 150 - 450 K/uL    MPV 10.8 9.2 - 12.9 fL    Immature Granulocytes 0.1 0.0 - 0.5 %    Gran # (ANC) 4.5 1.8 - 7.7 K/uL    Immature Grans (Abs) 0.01 0.00 - 0.04 K/uL    Lymph # 2.0 1.0 - 4.8 K/uL    Mono # 0.5 0.3 - 1.0 K/uL    Eos # 0.3 0.0 - 0.5 K/uL    Baso # 0.03 0.00 - 0.20 K/uL    nRBC 0 0 /100 WBC    Gran % 61.4 38.0 - 73.0 %    Lymph % 26.8 18.0 - 48.0 %    Mono % 7.1 4.0 - 15.0 %    Eosinophil % 4.2 0.0 - 8.0 %    Basophil % 0.4 0.0 - 1.9 %    Differential Method Automated    Hemoglobin A1C   Result Value Ref Range    Hemoglobin A1C 5.5 4.0 - 5.6 %    Estimated Avg Glucose 111 68 - 131 mg/dL   Lipid Panel   Result Value Ref Range    Cholesterol 126 120 - 199 mg/dL    Triglycerides 66 30 - 150 mg/dL    HDL 46 40 - 75 mg/dL    LDL Cholesterol 66.8 63.0 - 159.0 mg/dL    HDL/Cholesterol Ratio 36.5 20.0 - 50.0 %    Total Cholesterol/HDL Ratio 2.7 2.0 - 5.0    Non-HDL Cholesterol 80 mg/dL          ASSESSMENT AND PLAN:  274}  1. Routine medical exam  Counseled on age appropriate medical preventative services including age appropriate cancer screenings, age appropriate eye and dental exams, over all nutritional health, need for a consistent exercise regimen, and an over all push towards maintaining a vigorous and active lifestyle.  Counseled on age appropriate vaccines and discussed upcoming health care needs based on age/gender. Discussed good sleep hygiene and stress management.    2. Essential hypertension  The current medical regimen is effective;  continue present plan and medications. Recommended patient to check home readings to monitor and see me for followup as scheduled or sooner as  needed.   Discussed sodium restriction, maintaining ideal body weight and regular exercise program as physiologic means to continue to achieve blood pressure control in addition to medication compliance.  Patient was educated that both decongestant and anti-inflammatory medication may raise blood pressure.  The patient is active on the digital hypertension program.  - amLODIPine (NORVASC) 5 MG tablet; Take 1 tablet (5 mg total) by mouth once daily.  Dispense: 90 tablet; Refill: 2    3. Persistent atrial fibrillation  The current medical regimen is effective;  continue present plan and medications.   Followed by: Cardiology.   - rivaroxaban (XARELTO) 20 mg Tab; Take 1 tablet (20 mg total) by mouth every evening.  Dispense: 90 tablet; Refill: 2    4. Borderline hyperlipidemia  Lab Results   Component Value Date    LDLCALC 66.8 06/27/2022     We discussed low fat diet and regular exercise.The current medical regimen is effective;  continue present plan and medications.   - atorvastatin (LIPITOR) 20 MG tablet; Take 1 tablet (20 mg total) by mouth once daily.  Dispense: 90 tablet; Refill: 2    5. Prediabetes  Lab Results   Component Value Date    HGBA1C 5.5 06/27/2022     Stable. We discussed low sugar/low carbohydrate diet and regular exercise to prevent progression. No need for prescription medication at this time.  Check A1c yearly.    6. Osteopenia after menopause  We discussed adequate calcium and vitamin D supplementation. We discussed fall precautions. She is up to date on her BMD. No need for prescription medication at this time.    7. History of stroke  Stable/asymptomatic.  We discussed risk factor reduction.    8. Obesity (BMI 30-39.9)  BMI Readings from Last 3 Encounters:   07/01/22 30.58 kg/m²   08/16/21 31.20 kg/m²   07/22/21 31.29 kg/m²     The patient is asked to make an attempt to improve diet and exercise patterns to aid in medical management of this problem.    9. Dermatographism  Likely idiopathic  etiology. (she also had severe itching of upper extremities but this has resolved). Advised on daily anti-histamine. Plans to see allergist/dermatologist for further evaluation.    10. Left hip pain  Encouraged regular HEP (focus on yoga for stretching, strengthening and balance). Consider referral to ortho/pain management if symptoms worsen or persist.           No orders of the defined types were placed in this encounter.     Follow up in about 1 year (around 7/1/2023), or if symptoms worsen or fail to improve, for annual exam. or sooner as needed.

## 2022-07-19 ENCOUNTER — PATIENT MESSAGE (OUTPATIENT)
Dept: OPTOMETRY | Facility: CLINIC | Age: 77
End: 2022-07-19
Payer: MEDICARE

## 2022-08-12 ENCOUNTER — PATIENT MESSAGE (OUTPATIENT)
Dept: CARDIOLOGY | Facility: CLINIC | Age: 77
End: 2022-08-12
Payer: MEDICARE

## 2022-08-15 DIAGNOSIS — R07.9 CHEST PAIN, UNSPECIFIED TYPE: Primary | ICD-10-CM

## 2022-08-17 ENCOUNTER — OFFICE VISIT (OUTPATIENT)
Dept: CARDIOLOGY | Facility: CLINIC | Age: 77
End: 2022-08-17
Payer: MEDICARE

## 2022-08-17 ENCOUNTER — HOSPITAL ENCOUNTER (OUTPATIENT)
Dept: CARDIOLOGY | Facility: CLINIC | Age: 77
Discharge: HOME | End: 2022-08-17
Payer: MEDICARE

## 2022-08-17 VITALS
HEART RATE: 74 BPM | HEIGHT: 65 IN | DIASTOLIC BLOOD PRESSURE: 72 MMHG | BODY MASS INDEX: 29.34 KG/M2 | WEIGHT: 176.13 LBS | SYSTOLIC BLOOD PRESSURE: 131 MMHG

## 2022-08-17 DIAGNOSIS — R29.6 RECURRENT FALLS: Primary | ICD-10-CM

## 2022-08-17 DIAGNOSIS — R07.9 CHEST PAIN, UNSPECIFIED TYPE: ICD-10-CM

## 2022-08-17 DIAGNOSIS — R26.81 UNSTEADY GAIT: ICD-10-CM

## 2022-08-17 PROCEDURE — 99214 OFFICE O/P EST MOD 30 MIN: CPT | Mod: 25,GC,S$GLB, | Performed by: STUDENT IN AN ORGANIZED HEALTH CARE EDUCATION/TRAINING PROGRAM

## 2022-08-17 PROCEDURE — 1159F MED LIST DOCD IN RCRD: CPT | Mod: CPTII,GC,S$GLB, | Performed by: STUDENT IN AN ORGANIZED HEALTH CARE EDUCATION/TRAINING PROGRAM

## 2022-08-17 PROCEDURE — 3075F SYST BP GE 130 - 139MM HG: CPT | Mod: CPTII,GC,S$GLB, | Performed by: STUDENT IN AN ORGANIZED HEALTH CARE EDUCATION/TRAINING PROGRAM

## 2022-08-17 PROCEDURE — 1101F PR PT FALLS ASSESS DOC 0-1 FALLS W/OUT INJ PAST YR: ICD-10-PCS | Mod: CPTII,GC,S$GLB, | Performed by: STUDENT IN AN ORGANIZED HEALTH CARE EDUCATION/TRAINING PROGRAM

## 2022-08-17 PROCEDURE — 1159F PR MEDICATION LIST DOCUMENTED IN MEDICAL RECORD: ICD-10-PCS | Mod: CPTII,GC,S$GLB, | Performed by: STUDENT IN AN ORGANIZED HEALTH CARE EDUCATION/TRAINING PROGRAM

## 2022-08-17 PROCEDURE — 99999 PR PBB SHADOW E&M-EST. PATIENT-LVL III: ICD-10-PCS | Mod: PBBFAC,GC,, | Performed by: STUDENT IN AN ORGANIZED HEALTH CARE EDUCATION/TRAINING PROGRAM

## 2022-08-17 PROCEDURE — 99999 PR PBB SHADOW E&M-EST. PATIENT-LVL III: CPT | Mod: PBBFAC,GC,, | Performed by: STUDENT IN AN ORGANIZED HEALTH CARE EDUCATION/TRAINING PROGRAM

## 2022-08-17 PROCEDURE — 1125F PR PAIN SEVERITY QUANTIFIED, PAIN PRESENT: ICD-10-PCS | Mod: CPTII,GC,S$GLB, | Performed by: STUDENT IN AN ORGANIZED HEALTH CARE EDUCATION/TRAINING PROGRAM

## 2022-08-17 PROCEDURE — 3288F PR FALLS RISK ASSESSMENT DOCUMENTED: ICD-10-PCS | Mod: CPTII,GC,S$GLB, | Performed by: STUDENT IN AN ORGANIZED HEALTH CARE EDUCATION/TRAINING PROGRAM

## 2022-08-17 PROCEDURE — 3078F DIAST BP <80 MM HG: CPT | Mod: CPTII,GC,S$GLB, | Performed by: STUDENT IN AN ORGANIZED HEALTH CARE EDUCATION/TRAINING PROGRAM

## 2022-08-17 PROCEDURE — 99214 PR OFFICE/OUTPT VISIT, EST, LEVL IV, 30-39 MIN: ICD-10-PCS | Mod: 25,GC,S$GLB, | Performed by: STUDENT IN AN ORGANIZED HEALTH CARE EDUCATION/TRAINING PROGRAM

## 2022-08-17 PROCEDURE — 3078F PR MOST RECENT DIASTOLIC BLOOD PRESSURE < 80 MM HG: ICD-10-PCS | Mod: CPTII,GC,S$GLB, | Performed by: STUDENT IN AN ORGANIZED HEALTH CARE EDUCATION/TRAINING PROGRAM

## 2022-08-17 PROCEDURE — 1101F PT FALLS ASSESS-DOCD LE1/YR: CPT | Mod: CPTII,GC,S$GLB, | Performed by: STUDENT IN AN ORGANIZED HEALTH CARE EDUCATION/TRAINING PROGRAM

## 2022-08-17 PROCEDURE — 3288F FALL RISK ASSESSMENT DOCD: CPT | Mod: CPTII,GC,S$GLB, | Performed by: STUDENT IN AN ORGANIZED HEALTH CARE EDUCATION/TRAINING PROGRAM

## 2022-08-17 PROCEDURE — 93000 ELECTROCARDIOGRAM COMPLETE: CPT | Mod: S$GLB,,, | Performed by: INTERNAL MEDICINE

## 2022-08-17 PROCEDURE — 93000 EKG 12-LEAD: ICD-10-PCS | Mod: S$GLB,,, | Performed by: INTERNAL MEDICINE

## 2022-08-17 PROCEDURE — 1125F AMNT PAIN NOTED PAIN PRSNT: CPT | Mod: CPTII,GC,S$GLB, | Performed by: STUDENT IN AN ORGANIZED HEALTH CARE EDUCATION/TRAINING PROGRAM

## 2022-08-17 PROCEDURE — 3075F PR MOST RECENT SYSTOLIC BLOOD PRESS GE 130-139MM HG: ICD-10-PCS | Mod: CPTII,GC,S$GLB, | Performed by: STUDENT IN AN ORGANIZED HEALTH CARE EDUCATION/TRAINING PROGRAM

## 2022-08-17 NOTE — PROGRESS NOTES
"                                 CARDIOLOGY CLINIC      Referring Physician: No att. providers found  PCP: Vickie Hurtado MD  Cardiologist: Angeline Arthur MD      Reason for Consult:  Patients comes to follow up on chronic medical conditions       PMH:   1. Persistent A Fib rate controlled. CHADSVASC 6 (age 3; female 1; HTN 1; stroke 2) on Xarelto 20 mg every night  for thromboembolic prophylaxis.   2. AVNRT (s/p EP study 2004)  3. Stroke (9/2019) with no residual deficit   4. Recurrent falls   5. HTN, HLD, osteopenia      HPI:  Ms. Jolene GUADARRAMA comes to the Cardiology clinic today to follow up on chronic medical conditions. She follows up with Dr. Stanley for persistent atrial fibrillation. The last time EP saw her was last year. The patient was diagnosed with A Fib in 2012. At the time, she was complaining of recurrent palpitations; an event Monitor at the time revealed SVT, she was taken for an EP study which was significant for AT seen at the end of the procedure but resolved once isoproterenol out of the patient's system, and therefore it could not be ablated. On EP follow-up, EKG was significant for A fib. The patient has tried multiple medications,  including flecainide which was dced due to fatigue and "low blood pressure", and dronedarone which was stopped by the patient as she was concerned it had caused a rash on her arm. She has always been on Xarelto for thromboembolic prophylaxis. The patient states palpitations at night when lying down on her left side, but they are not bothersome. She denies lightheadedness, chest pain, and SOB. She lives at home with her  and states she has had three falls over the last year. One of the falls she felt forward while walking in the garage. The other falls were while gardening. Ms. Fernández states she feels unsteady when walking. She denies any bleeding from her gums or melena. She was given a prescription for furosemide for lower extremity edema a few months " ago, which she takes as needed.      ECG personally reviewed by me shows atrial fibrillation at a rate of 89 beats per minute. Low voltage is present, as well as nonspecific ST abnormality      ROS:  Constitution: Negative for fever, chills, weight loss or gain.   HENT: Negative for sore throat, rhinorrhea, or headache.  Eyes: Negative for blurred or double vision.   Cardiovascular: As described on HPI   Pulmonary: As described on HPI   Gastrointestinal: Negative for abdominal pain, nausea, vomiting, or diarrhea. Negative for melena/hematochezia.  : Negative for dysuria.   Neurological: Negative for focal weakness or sensory changes.  Skin: No bleeding or bruising      Past Medical History:   Diagnosis Date    Atrial fibrillation     Followed by EP cardiology    Borderline hyperlipidemia     Cataract     History of abnormal Pap smear     More than 20 years ago; status post colposcopy    History of anxiety disorder     History of hypertension     Currently doing okay off medication    Hypertension     Osteopenia     Refuses medication    Overweight(278.02)     Stroke 2019     Past Surgical History:   Procedure Laterality Date     SECTION, LOW TRANSVERSE      X1    COLONOSCOPY N/A 2020    Procedure: COLONOSCOPY;  Surgeon: Maicol Johnson MD;  [facility] [place] [address]);  Service: Endoscopy;  Laterality: N/A;  Covid test on . Pt. holding Xarelto for 2 days prior to.EC    left knee meniscus surgery  2012    neck tuck and liposuction      PSVT ablation      RADIOFREQUENCY ABLATION  2004    Skin cancer removal on the left eye       Family History   Problem Relation Age of Onset    Heart disease Mother     Cancer Father         nasopharynx    Breast cancer Cousin     Cancer Cousin         colon cancer    Hypertension Sister     Diabetes Sister     Cancer Sister     Ovarian cancer Sister     Coronary artery disease Brother         s/p stenting    Hypertension Brother      Rheum arthritis Sister     Hypertension Brother     Hypertension Brother     Heart disease Brother         s/p CABG    Hypertension Brother     Coronary artery disease Brother         s/p stenting    Cancer Brother     No Known Problems Maternal Grandmother     No Known Problems Maternal Grandfather     No Known Problems Paternal Grandmother     No Known Problems Paternal Grandfather     No Known Problems Maternal Aunt     No Known Problems Maternal Uncle     No Known Problems Paternal Aunt     No Known Problems Paternal Uncle     Colon cancer Neg Hx     Amblyopia Neg Hx     Blindness Neg Hx     Cataracts Neg Hx     Glaucoma Neg Hx     Macular degeneration Neg Hx     Retinal detachment Neg Hx     Strabismus Neg Hx     Stroke Neg Hx     Thyroid disease Neg Hx      Social History     Tobacco Use    Smoking status: Former Smoker     Types: Cigarettes    Smokeless tobacco: Never Used   Substance Use Topics    Alcohol use: Yes     Comment: Rarely    Drug use: No     Review of patient's allergies indicates:   Allergen Reactions    Flecainide Other (See Comments)     Other reaction(s): worsening arrythmia    Multaq [dronedarone]      rash       Current Outpatient Medications on File Prior to Visit   Medication Sig Dispense Refill    amLODIPine (NORVASC) 5 MG tablet Take 1 tablet (5 mg total) by mouth once daily. 90 tablet 2    atorvastatin (LIPITOR) 20 MG tablet Take 1 tablet (20 mg total) by mouth once daily. 90 tablet 2    gabapentin (NEURONTIN) 100 MG capsule Take 1 capsule (100 mg total) by mouth 3 (three) times daily. 90 capsule 11    rivaroxaban (XARELTO) 20 mg Tab Take 1 tablet (20 mg total) by mouth every evening. 90 tablet 2    furosemide (LASIX) 20 MG tablet Take 1 tablet (20 mg total) by mouth 2 (two) times daily as needed (Leg Swelling). (Patient not taking: Reported on 8/17/2022) 90 tablet 2     No current facility-administered medications on file prior to visit.  "      Continuous Infusions:    Scheduled Meds:  PRN Meds:      OBJECTIVE:  Vitals:    08/17/22 1533   BP: 131/72   Pulse: 74   Weight: 79.9 kg (176 lb 2.4 oz)   Height: 5' 5" (1.651 m)   PainSc:   2   PainLoc: Leg     Gen: Lying in bed, no acute distress  HEENT: Supple, no JVD  Cvs: Irregular  rate and rhythm, no murmurs  Resp: Normal work of breathing, clear bilaterally  Abd: Soft, non-tender and not distended  Ext: Warm, no edema.        Micro:   Blood Cultures  No results found for: LABBLOO  Urine Cultures  Lab Results   Component Value Date    LABURIN No significant growth 07/22/2021    LABURIN No significant growth 09/18/2019    LABURIN  06/28/2018     STREPTOCOCCUS AGALACTIAE (GROUP B)  < 10,000 cfu/ml  Beta-hemolytic streptococci are routinely susceptible to   penicillins,cephalosporins and carbapenems.  No other significant isolate      LABURIN ESCHERICHIA COLI  >100,000 cfu/ml   08/17/2016    LABURIN ENTEROCOCCUS FAECALIS  > 100,000 cfu/ml   09/25/2015           IMAGING:    EKGs:    08/17/22 EKG: A fib with a ventricular response of 89 bpm.         6/21/21 Holter:  · Baseline rhythm was coarse atrial fibrillation with narrow QRS and an average heart rate while in atrial fibrillation of 78 bpm.  · There were three episodes with reported symptoms of shortness of breath, leg pain, and back pain. These episodes all corresponded to periods of atrial fibrillation, with one episode of AF with RVR, rate of 106 bpm.  · There were occasional PVCs with an overall PVC burden of 0.5%.  · The patient remained in atrial fibrillation with periods of atrial flutter for the duration of study. The longest RR while in atrial fibrillation was 2.2 seconds.      TTE:  7/7/21 TTE:  · Atrial fibrillation observed.  · The left ventricle is normal in size with normal systolic function.  · The estimated ejection fraction is 63%.  · Mild left atrial enlargement.  · Normal right ventricular size with normal right ventricular systolic " function.  · Moderate right atrial enlargement.  · The MR is mild to mild-moderate (1-2+).  · Mild to moderate tricuspid regurgitation.  · Normal central venous pressure (3 mmHg).  · The estimated PA systolic pressure is 24 mmHg.  · Trivial posterior pericardial effusion    EF   Date Value Ref Range Status   07/13/2021 63 % Final       Prior Ischemic Evaluation: None found on EMR       IMPRESSION:  This is a 78 yo F with PMH of Persistent A-Fib rate controlled. CHADSVASC 6 (age 3; female 1; HTN 1; stroke 2) on Xarelto 20 mg every night for thromboembolic prophylaxis, HAS-BLED 4, AVNRT (s/p EP study 2004), stroke (9/2019) with no residual deficit, HTN, HLD, osteopenia who comes to the clinic for follow up. On further interrogation, the patient states a history of falls x3 and feeling unsteady when walking. We explained that falling was very concerning,  given the fact that anticoagulation increases the risk of bleeding, and falling and hitting her head could result in intracranial bleeding. Furthermore, her HAS-BLED score is 4 points which indicates she has an 8.9% 1-year risk for major bleeding* (intracranial, hospitalization, hemoglobin decrease > 2 g/L, and/or transfusion). We have explained other options to decrease the risk of thromboembolism, such as YASSINE closure with the Watchman device. These options are available in patients presenting with bleeding or falls that result in major fractures, and she would benefit from an EP referral to discuss the possibility of YASSINE closure; However, the patient has requested time to think about these options and stated she would let us know next visit. For now, we have recommended PT to improve her balance, increase her strength, and reduce her chances of falling. The patient has agreed with the referral and stated she will be more careful and aware as she understands the risk of intracranial bleeding. She also agreed to come to the ED if she presented any falls that resulted  in even minor trauma to her head. She is also aware that in case of gum bleeding or melena, she needs to come to the ED immediately.     *Krunal Marquis et al. A novel user-friendly score (HAS-BLED) to assess 1-year risk of major bleeding in patients with atrial fibrillation: the [employer] Heart Survey. Chest vol. 138,5 (2010): 1093-100. doi:10.1378/chest.100134        PLAN:    HTN   Controlled   On Amlodipine 5 mg daily     Plan:  Continue same medications     HLD   LDL 76 on target (6/27/22)    Plan:  Continue same meds     Long standing persistent A fib  Rate controlled on no rate controlling medications   CHADSVASC  6 (age 3; female 1; HTN 1; stroke 2) on Xarelto 20 mg every night  for thromboembolic prophylaxis.   HASBLED 4   HR log   (only 2 episodes of HR in the 100's)    Plan:  Continue same meds   Will talk again with the patient about the need for  EP referral for possible YASSINE closure on next visit.     Multiple falls   PT referral       Raymond Reid M.D.  Page # [phone]  Cardiovascular Fellow  [facility]

## 2022-08-18 ENCOUNTER — PATIENT MESSAGE (OUTPATIENT)
Dept: FAMILY MEDICINE | Facility: CLINIC | Age: 77
End: 2022-08-18
Payer: MEDICARE

## 2022-08-24 ENCOUNTER — PATIENT MESSAGE (OUTPATIENT)
Dept: FAMILY MEDICINE | Facility: CLINIC | Age: 77
End: 2022-08-24
Payer: MEDICARE

## 2022-09-30 ENCOUNTER — PATIENT MESSAGE (OUTPATIENT)
Dept: FAMILY MEDICINE | Facility: CLINIC | Age: 77
End: 2022-09-30
Payer: MEDICARE

## 2022-10-04 ENCOUNTER — PATIENT MESSAGE (OUTPATIENT)
Dept: FAMILY MEDICINE | Facility: CLINIC | Age: 77
End: 2022-10-04
Payer: MEDICARE

## 2022-10-10 ENCOUNTER — PATIENT MESSAGE (OUTPATIENT)
Dept: FAMILY MEDICINE | Facility: CLINIC | Age: 77
End: 2022-10-10
Payer: MEDICARE

## 2022-10-17 ENCOUNTER — OFFICE VISIT (OUTPATIENT)
Dept: OPTOMETRY | Facility: CLINIC | Age: 77
End: 2022-10-17
Payer: MEDICARE

## 2022-10-17 DIAGNOSIS — H52.7 REFRACTIVE ERROR: ICD-10-CM

## 2022-10-17 DIAGNOSIS — Z97.3 WEARS CONTACT LENSES: ICD-10-CM

## 2022-10-17 DIAGNOSIS — Z46.0 ENCOUNTER FOR FITTING OR ADJUSTMENT OF SPECTACLES OR CONTACT LENSES: Primary | ICD-10-CM

## 2022-10-17 DIAGNOSIS — H25.13 NUCLEAR SCLEROSIS OF BOTH EYES: Primary | ICD-10-CM

## 2022-10-17 DIAGNOSIS — H25.013 CORTICAL AGE-RELATED CATARACT OF BOTH EYES: ICD-10-CM

## 2022-10-17 PROCEDURE — 1100F PR PT FALLS ASSESS DOC 2+ FALLS/FALL W/INJURY/YR: ICD-10-PCS | Mod: CPTII,S$GLB,, | Performed by: OPTOMETRIST

## 2022-10-17 PROCEDURE — 1126F PR PAIN SEVERITY QUANTIFIED, NO PAIN PRESENT: ICD-10-PCS | Mod: CPTII,S$GLB,, | Performed by: OPTOMETRIST

## 2022-10-17 PROCEDURE — 92015 DETERMINE REFRACTIVE STATE: CPT | Mod: S$GLB,,, | Performed by: OPTOMETRIST

## 2022-10-17 PROCEDURE — 3288F PR FALLS RISK ASSESSMENT DOCUMENTED: ICD-10-PCS | Mod: CPTII,S$GLB,, | Performed by: OPTOMETRIST

## 2022-10-17 PROCEDURE — 1160F RVW MEDS BY RX/DR IN RCRD: CPT | Mod: CPTII,S$GLB,, | Performed by: OPTOMETRIST

## 2022-10-17 PROCEDURE — 99499 UNLISTED E&M SERVICE: CPT | Mod: S$GLB,,, | Performed by: OPTOMETRIST

## 2022-10-17 PROCEDURE — 92310 CONTACT LENS FITTING OU: CPT | Mod: CSM,S$GLB,, | Performed by: OPTOMETRIST

## 2022-10-17 PROCEDURE — 1100F PTFALLS ASSESS-DOCD GE2>/YR: CPT | Mod: CPTII,S$GLB,, | Performed by: OPTOMETRIST

## 2022-10-17 PROCEDURE — 99999 PR PBB SHADOW E&M-EST. PATIENT-LVL II: ICD-10-PCS | Mod: PBBFAC,,, | Performed by: OPTOMETRIST

## 2022-10-17 PROCEDURE — 92015 PR REFRACTION: ICD-10-PCS | Mod: S$GLB,,, | Performed by: OPTOMETRIST

## 2022-10-17 PROCEDURE — 99499 NO LOS: ICD-10-PCS | Mod: S$GLB,,, | Performed by: OPTOMETRIST

## 2022-10-17 PROCEDURE — 92310 PR CONTACT LENS FITTING (NO CHANGE): ICD-10-PCS | Mod: CSM,S$GLB,, | Performed by: OPTOMETRIST

## 2022-10-17 PROCEDURE — 1159F MED LIST DOCD IN RCRD: CPT | Mod: CPTII,S$GLB,, | Performed by: OPTOMETRIST

## 2022-10-17 PROCEDURE — 3288F FALL RISK ASSESSMENT DOCD: CPT | Mod: CPTII,S$GLB,, | Performed by: OPTOMETRIST

## 2022-10-17 PROCEDURE — 99999 PR PBB SHADOW E&M-EST. PATIENT-LVL II: CPT | Mod: PBBFAC,,, | Performed by: OPTOMETRIST

## 2022-10-17 PROCEDURE — 1126F AMNT PAIN NOTED NONE PRSNT: CPT | Mod: CPTII,S$GLB,, | Performed by: OPTOMETRIST

## 2022-10-17 PROCEDURE — 1159F PR MEDICATION LIST DOCUMENTED IN MEDICAL RECORD: ICD-10-PCS | Mod: CPTII,S$GLB,, | Performed by: OPTOMETRIST

## 2022-10-17 PROCEDURE — 92014 PR EYE EXAM, EST PATIENT,COMPREHESV: ICD-10-PCS | Mod: S$GLB,,, | Performed by: OPTOMETRIST

## 2022-10-17 PROCEDURE — 1160F PR REVIEW ALL MEDS BY PRESCRIBER/CLIN PHARMACIST DOCUMENTED: ICD-10-PCS | Mod: CPTII,S$GLB,, | Performed by: OPTOMETRIST

## 2022-10-17 PROCEDURE — 92014 COMPRE OPH EXAM EST PT 1/>: CPT | Mod: S$GLB,,, | Performed by: OPTOMETRIST

## 2022-10-17 NOTE — PROGRESS NOTES
Discharge Summary    CHIEF COMPLAINT ON ADMISSION  Chief Complaint   Patient presents with   • Ankle Injury       Reason for Admission  EMS     CODE STATUS  Full Code    HPI & HOSPITAL COURSE     Ms. Gloria Patel is a 89-year-old female with a PMHx of HTN, hypothyroidism, glaucoma, TIA, renal cyst, GERD, who presented on 12/21/2021 after having a mechanical fall and complains of right ankle pain thereafter.  Patient lives in an independent living facility at [facility].  Patient reported to use a walker ambulating, and prior to admission, patient noted to have slipped while trying to  something off the ground.  EMS was called and patient was brought to the ER.    In ER, x-rays obtained and noted closed right ankle fracture dislocation.  The ankle was reduced in ER and orthopedics was consulted.  Patient was taken to the OR on 12/22/2021 for an open reduction internal fixation of the right ankle with orthopedics, Dr. Causey.  Patient brought into the observation unit for post procedure monitoring and postacute evaluation needs.    During this course of stay, patient was evaluated by PT/OT of which she was recommended to have postacute placement for further treatments and therapies.  Patient encouraged to follow-up with PCP s/p hospitalization.  Discussed with patient following up with orthopedic surgery in approximately 2 weeks status post surgical intervention.  All questions and concerns answered from patient and family prior to being discharged [facility].  All appropriate prescription sent to the other facility and pain medications are printed until SNF provider takes over.  [facility] will be [facility].      Therefore, she is discharged in fair and stable condition to skilled nursing facility.    The patient met 2-midnight criteria for an inpatient stay at the time of discharge.      FOLLOW UP ITEMS POST DISCHARGE  12/23/21  Please call [phone] to schedule  Subjective:       Patient ID: Gale Fernández is a 77 y.o. female      Chief Complaint   Patient presents with    Concerns About Ocular Health    Contact Lens Follow Up     History of Present Illness  Dls: 3/16/21 Dr. Meza     78 y/o female presents today with c/o problems at night.   Pt wears scls ,  pal's and otc readers.     No tearing  No itching  No burning  No pain  No ha's  + ou off/on floaters    Eye meds  None    Air Optix. Replacing every 3 weeks. Sleeps in lenses every day       Assessment/Plan:     1. Nuclear sclerosis of both eyes  2. Cortical age-related cataract of both eyes  NVS per pt. Educated pt on presence of cataracts and effects on vision. No surgery at this time. Recheck in one year, sooner PRN.    3. Refractive error  Educated patient on refractive error and discussed lens options. Dispensed updated spectacle Rx. Educated about adaptation period to new specs.    Eyeglass Final Rx       Eyeglass Final Rx         Sphere Cylinder Axis Add    Right -10.75 +0.50 160 +2.50    Left -10.75 +1.25 015 +2.50      Expiration Date: 10/17/2023                      4. Wears contact lenses  Pt sleeps in lenses daily. Switch to N&D lenses. Contact lens Rx released to pt. Daily wear only advised, replacement monthly with education to risks of extended wear, including blindness and increased risk of eye infection. Discussed lens care, compliance and solutions. RTC yearly contact lens follow up.    Contact Lens Final Rx       Final Contact Lens Rx         Brand Base Curve Diameter Sphere Cylinder    Right Air Optix Night and Day 8.6 13.8 -9.50 Sphere    Left Air Optix Night and Day 8.6 13.8 -9.00 Sphere      Expiration Date: 10/17/2023    Replacement: Monthly    Solutions: OptiFree PureMoist    Wearing Schedule: Daily Wear                        Follow up in about 1 year (around 10/17/2023).        PCP appointment for patient.    Required specialty appointments include:       Discharge Instructions per MIKE Patel    -Patient to transfer to [facility] for further treatment and therapy  -Patient recommended to follow-up with PCP in approximately 1-2 weeks s/p hospitalization  -Patient to follow-up with orthopedic surgery, Dr. Causey in approximately 1-2 weeks s/p surgical intervention    DIET: As tolerated    ACTIVITY: As tolerated    DIAGNOSIS: Right ankle fracture    Return to ER if symptoms persist, chest pain, palpitations, shortness of breath, numbness, tingling, weakness, and high fevers.      DISCHARGE DIAGNOSES  Principal Problem (Resolved):    Ankle fracture, bimalleolar, closed, right, initial encounter POA: Yes  Active Problems:    Hypothyroid POA: Yes    HTN (hypertension) POA: Yes    Bradycardia POA: Yes      FOLLOW UP  Future Appointments   Date Time Provider Department Center   3/7/2022 10:00 AM Mike Otero M.D. [facility] None     [facility] [address] [address]  [phone]        Mike Otero M.D.  [address] [facility]  [address]  [facility] [address]  [phone]    Schedule an appointment as soon as possible for a visit in 2 weeks      Pop Causey M.D.  [address] [facility] [id number] [phone]    Schedule an appointment as soon as possible for a visit in 2 weeks        MEDICATIONS ON DISCHARGE     Medication List      START taking these medications      Instructions   baclofen 5 MG Tabs  Commonly known as: Lioresal   Take 1 Tablet by mouth 3 times a day for 15 days.  Dose: 5 mg     ibuprofen 800 MG Tabs  Commonly known as: MOTRIN   Take 1 Tablet by mouth every 6 hours as needed for Mild Pain or Inflammation for up to 15 days.  Dose: 800 mg     latanoprost 0.005 % Soln  Commonly known as: XALATAN   Administer 1 Drop into both eyes every evening for 30 days.  Dose: 1 Drop     traMADol 50 MG  Tabs  Commonly known as: ULTRAM   Take 1 Tablet by mouth every 8 hours as needed for Moderate Pain for up to 3 days.  Dose: 50 mg        CONTINUE taking these medications      Instructions   amLODIPine 5 MG Tabs  Commonly known as: NORVASC   Take 1 tablet by mouth every day.  Dose: 5 mg     fluticasone 50 MCG/ACT nasal spray  Commonly known as: FLONASE   Administer 1 Spray into affected nostril(S) every day.  Dose: 1 Spray     levothyroxine 75 MCG Tabs  Commonly known as: SYNTHROID   TAKE 1 TABLET BY MOUTH EVERY DAY     lisinopril 20 MG Tabs  Commonly known as: PRINIVIL   TAKE HALF A TABLET BY MOUTH TWICE A DAY     metoprolol SR 25 MG Tb24  Commonly known as: TOPROL XL   Take 1 Tab by mouth every day.  Dose: 25 mg     polyethylene glycol 3350 17 GM/SCOOP Powd  Commonly known as: MIRALAX   Take 17 g by mouth every day. Indications: Constipation  Dose: 17 g     sertraline 25 MG tablet  Commonly known as: Zoloft   Take 1 tablet by mouth every day.  Dose: 25 mg     Travatan Z 0.004 % Soln  Generic drug: travoprost   Administer 1 Drop into both eyes every evening. Indications: Wide-Angle Glaucoma  Dose: 1 Drop            Allergies  Allergies   Allergen Reactions   • Morphine Anaphylaxis     anaphylaxis   • Cephalexin Rash     Full body   • Codeine Vomiting and Nausea   • Metronidazole Vomiting and Nausea   • Sulfa Drugs Rash     Full body       DIET  Orders Placed This Encounter   Procedures   • Diet Order Diet: Regular     Standing Status:   Standing     Number of Occurrences:   1     Order Specific Question:   Diet:     Answer:   Regular [1]       ACTIVITY  As tolerated.  Non-weight bearing RIGHT leg    LINES, DRAINS, AND WOUNDS  This is an automated list. Peripheral IVs will be removed prior to discharge.  Peripheral IV 12/22/21 Anterior;Distal;Right Upper arm (Active)   Site Assessment Clean;Intact;Dry 12/23/21 0800   Dressing Type Transparent;Ace Wrap 12/23/21 0800   Line Status Flushed;Scrubbed the hub prior to  access;Saline locked 12/23/21 0800   Dressing Status Clean;Dry;Intact 12/23/21 0800   Dressing Intervention Initial dressing 12/22/21 2300   Infiltration Grading ([facility], [facility]) 0 12/23/21 0800   Phlebitis Scale ([facility] Only) 0 12/23/21 0800       Wound 12/22/21 Incision Ankle Right xerform, 4x4's, soft roll, ace, splint (Active)   Site Assessment TETE 12/23/21 0800   Periwound Assessment TETE 12/22/21 2030   Margins TETE 12/22/21 2030   Drainage Amount None 12/23/21 0800   Dressing Options Ace Wrap;Dry Gauze;Splint 12/23/21 0800   Dressing Changed Observed 12/22/21 2030   Dressing Status Clean;Dry;Intact 12/23/21 0800       Peripheral IV 12/22/21 Anterior;Distal;Right Upper arm (Active)   Site Assessment Clean;Intact;Dry 12/23/21 0800   Dressing Type Transparent;Ace Wrap 12/23/21 0800   Line Status Flushed;Scrubbed the hub prior to access;Saline locked 12/23/21 0800   Dressing Status Clean;Dry;Intact 12/23/21 0800   Dressing Intervention Initial dressing 12/22/21 2300   Infiltration Grading ([facility], [facility]) 0 12/23/21 0800   Phlebitis Scale ([facility] Only) 0 12/23/21 0800               MENTAL STATUS ON TRANSFER      -Patient is A/OX4.  Patient underwent a right ORIF due to right ankle fracture.  Overnight, patient had a bout of confusion of which this might be contributory to post anesthesia and/or pain medication versus sundowning.  Patient and family does not report any history of sundowning.       CONSULTATIONS  Orthopedic surgery    PROCEDURES  Right ankle ORIF    IMAGING  DX-ANKLE 2- VIEWS RIGHT   Final Result      Intraoperative images intended for localization and not for diagnostic purposes demonstrate ORIF of bimalleolar fracture. See procedure report for details.      Fluoroscopy Time:  1 minutes and 6 seconds.  4 fluoroscopic images were obtained.      DX-PORTABLE FLUOROSCOPY < 1 HOUR   Final Result      Intraoperative images intended for localization and not for diagnostic purposes demonstrate ORIF of  bimalleolar fracture. See procedure report for details.      Fluoroscopy Time:  1 minutes and 6 seconds.  4 fluoroscopic images were obtained.      DX-ANKLE 2- VIEWS RIGHT   Final Result      Improved alignment of the ankle joint, distal tibial and fibular fractures following closed reduction.      DX-TIBIA AND FIBULA RIGHT   Final Result         Acute displaced fractures of the distal tibia and fibula with dislocated tibiotalar joint.      DX-ANKLE 2- VIEWS RIGHT   Final Result         Acute displaced fractures of the distal tibia and fibula with dislocated tibiotalar joint.            LABORATORY  Lab Results   Component Value Date    SODIUM 134 (L) 12/23/2021    POTASSIUM 4.3 12/23/2021    CHLORIDE 100 12/23/2021    CO2 24 12/23/2021    GLUCOSE 121 (H) 12/23/2021    BUN 16 12/23/2021    CREATININE 0.75 12/23/2021    CREATININE 0.99 01/09/2013    GLOMRATE >59 08/04/2010        Lab Results   Component Value Date    WBC 7.7 12/23/2021    WBC 7.8 01/09/2013    HEMOGLOBIN 11.2 (L) 12/23/2021    HEMATOCRIT 34.1 (L) 12/23/2021    PLATELETCT 177 12/23/2021        Total time of the discharge process exceeds 36 minutes.    ======================================================================================================  Please note that this dictation was created using voice recognition software. I have made every reasonable attempt to correct obvious errors, but there may be errors of grammar and possibly content that I did not discover before finalizing the note.    Electronically signed by:  DENISE Cordero, MSN, APRN, FNP-C  Hospitalist Services  [facility]  [phone]  [email or web address]  12/23/21     [id number]

## 2022-10-18 ENCOUNTER — OFFICE VISIT (OUTPATIENT)
Dept: DERMATOLOGY | Facility: CLINIC | Age: 77
End: 2022-10-18
Payer: MEDICARE

## 2022-10-18 ENCOUNTER — PATIENT MESSAGE (OUTPATIENT)
Dept: OPTOMETRY | Facility: CLINIC | Age: 77
End: 2022-10-18
Payer: MEDICARE

## 2022-10-18 VITALS — BODY MASS INDEX: 29.29 KG/M2 | WEIGHT: 176 LBS

## 2022-10-18 DIAGNOSIS — R20.2 NOTALGIA PARESTHETICA: Primary | ICD-10-CM

## 2022-10-18 DIAGNOSIS — L57.8 SOLAR AGING OF SKIN: ICD-10-CM

## 2022-10-18 DIAGNOSIS — I78.1 TELANGIECTASIAS: ICD-10-CM

## 2022-10-18 DIAGNOSIS — L81.7 SCHAMBERG'S CAPILLARITIS: ICD-10-CM

## 2022-10-18 PROCEDURE — 99999 PR PBB SHADOW E&M-EST. PATIENT-LVL II: CPT | Mod: PBBFAC,,, | Performed by: DERMATOLOGY

## 2022-10-18 PROCEDURE — 1126F AMNT PAIN NOTED NONE PRSNT: CPT | Mod: CPTII,S$GLB,, | Performed by: DERMATOLOGY

## 2022-10-18 PROCEDURE — 1159F MED LIST DOCD IN RCRD: CPT | Mod: CPTII,S$GLB,, | Performed by: DERMATOLOGY

## 2022-10-18 PROCEDURE — 99999 PR PBB SHADOW E&M-EST. PATIENT-LVL II: ICD-10-PCS | Mod: PBBFAC,,, | Performed by: DERMATOLOGY

## 2022-10-18 PROCEDURE — 1159F PR MEDICATION LIST DOCUMENTED IN MEDICAL RECORD: ICD-10-PCS | Mod: CPTII,S$GLB,, | Performed by: DERMATOLOGY

## 2022-10-18 PROCEDURE — 99203 OFFICE O/P NEW LOW 30 MIN: CPT | Mod: S$GLB,,, | Performed by: DERMATOLOGY

## 2022-10-18 PROCEDURE — 1126F PR PAIN SEVERITY QUANTIFIED, NO PAIN PRESENT: ICD-10-PCS | Mod: CPTII,S$GLB,, | Performed by: DERMATOLOGY

## 2022-10-18 PROCEDURE — 1160F PR REVIEW ALL MEDS BY PRESCRIBER/CLIN PHARMACIST DOCUMENTED: ICD-10-PCS | Mod: CPTII,S$GLB,, | Performed by: DERMATOLOGY

## 2022-10-18 PROCEDURE — 99203 PR OFFICE/OUTPT VISIT, NEW, LEVL III, 30-44 MIN: ICD-10-PCS | Mod: S$GLB,,, | Performed by: DERMATOLOGY

## 2022-10-18 PROCEDURE — 1160F RVW MEDS BY RX/DR IN RCRD: CPT | Mod: CPTII,S$GLB,, | Performed by: DERMATOLOGY

## 2022-10-18 NOTE — PROGRESS NOTES
Subjective:       Patient ID:  Gale Fernández is a 77 y.o. female who presents for   Chief Complaint   Patient presents with    Itching     Bilateral arms. Several years     Rash     legs     Would like treatment for pruritus on shoulders/upper arms which has been present for years no rash.  Also has rash on lower legs and some peeling on the tip of her nose off and on does not bleed.     Lesion - Initial  Affected locations: left arm, right arm and nose  Signs / symptoms: itching  Aggravated by: nothing  Relieving factors/Treatments tried: nothing    Itching    Review of Systems   Constitutional:  Negative for fever, chills, weight loss, weight gain, fatigue, night sweats and malaise.   Skin:  Positive for itching. Negative for daily sunscreen use, activity-related sunscreen use and wears hat.   Hematologic/Lymphatic: Does not bruise/bleed easily.      Objective:    Physical Exam   Constitutional: She appears well-developed and well-nourished. No distress.   Neurological: She is alert and oriented to person, place, and time. She is not disoriented.   Psychiatric: She has a normal mood and affect.   Skin:   Areas Examined (abnormalities noted in diagram):   Head / Face Inspection Performed  Neck Inspection Performed  RUE Inspected  LUE Inspection Performed  RLE Inspected  LLE Inspection Performed                 Diagram Legend     Erythematous scaling macule/papule c/w actinic keratosis       Vascular papule c/w angioma      Pigmented verrucoid papule/plaque c/w seborrheic keratosis      Yellow umbilicated papule c/w sebaceous hyperplasia      Irregularly shaped tan macule c/w lentigo     1-2 mm smooth white papules consistent with Milia      Movable subcutaneous cyst with punctum c/w epidermal inclusion cyst      Subcutaneous movable cyst c/w pilar cyst      Firm pink to brown papule c/w dermatofibroma      Pedunculated fleshy papule(s) c/w skin tag(s)      Evenly pigmented macule c/w junctional nevus     Mildly  variegated pigmented, slightly irregular-bordered macule c/w mildly atypical nevus      Flesh colored to evenly pigmented papule c/w intradermal nevus       Pink pearly papule/plaque c/w basal cell carcinoma      Erythematous hyperkeratotic cursted plaque c/w SCC      Surgical scar with no sign of skin cancer recurrence      Open and closed comedones      Inflammatory papules and pustules      Verrucoid papule consistent consistent with wart     Erythematous eczematous patches and plaques     Dystrophic onycholytic nail with subungual debris c/w onychomycosis     Umbilicated papule    Erythematous-base heme-crusted tan verrucoid plaque consistent with inflamed seborrheic keratosis     Erythematous Silvery Scaling Plaque c/w Psoriasis     See annotation      Assessment / Plan:        Notalgia paresthetica  No hot water  Dermeleve lotion OTC    Schamberg's capillaritis  reassurance      Solar aging of skin/Telangiectasias face  Sunscreen qd  Lotion with retinol hs  Call if develops bumps or bleeding.            Follow up in about 1 year (around 10/18/2023).

## 2022-11-16 ENCOUNTER — PES CALL (OUTPATIENT)
Dept: ADMINISTRATIVE | Facility: CLINIC | Age: 77
End: 2022-11-16
Payer: MEDICARE

## 2023-01-03 ENCOUNTER — PATIENT MESSAGE (OUTPATIENT)
Dept: FAMILY MEDICINE | Facility: CLINIC | Age: 78
End: 2023-01-03
Payer: MEDICARE

## 2023-01-03 DIAGNOSIS — F41.9 ANXIETY: ICD-10-CM

## 2023-01-03 RX ORDER — ALPRAZOLAM 0.25 MG/1
TABLET ORAL
Qty: 30 TABLET | Refills: 0 | Status: SHIPPED | OUTPATIENT
Start: 2023-01-03 | End: 2023-07-18 | Stop reason: SDUPTHER

## 2023-01-03 NOTE — TELEPHONE ENCOUNTER
No new care gaps identified.  [facility] Embedded Care Gaps. Reference number: [id number]. 1/03/2023   8:17:42 AM CST

## 2023-02-07 DIAGNOSIS — Z00.00 ENCOUNTER FOR MEDICARE ANNUAL WELLNESS EXAM: ICD-10-CM

## 2023-02-08 ENCOUNTER — TELEPHONE (OUTPATIENT)
Dept: FAMILY MEDICINE | Facility: CLINIC | Age: 78
End: 2023-02-08
Payer: MEDICARE

## 2023-02-08 DIAGNOSIS — Z12.31 ENCOUNTER FOR SCREENING MAMMOGRAM FOR MALIGNANT NEOPLASM OF BREAST: Primary | ICD-10-CM

## 2023-02-08 NOTE — TELEPHONE ENCOUNTER
----- Message from Elroy Sinclair sent at 2/8/2023  8:18 AM CST -----  Regarding: Self [phone]  Type:  Mammogram    Caller is requesting to schedule their annual mammogram appointment.  Order is not listed in EPIC.  Please enter order and contact patient to schedule.  Name of Caller: Self     Where would they like the mammogram performed? [facility]     Would the patient rather a call back or a response via [facility]? Call back     Best Call Back Number:  [phone]     Additional Information:

## 2023-02-09 DIAGNOSIS — Z00.00 ENCOUNTER FOR MEDICARE ANNUAL WELLNESS EXAM: ICD-10-CM

## 2023-02-23 ENCOUNTER — HOSPITAL ENCOUNTER (OUTPATIENT)
Dept: RADIOLOGY | Facility: HOSPITAL | Age: 78
Discharge: HOME | End: 2023-02-23
Attending: INTERNAL MEDICINE
Payer: MEDICARE

## 2023-02-23 DIAGNOSIS — Z12.31 ENCOUNTER FOR SCREENING MAMMOGRAM FOR MALIGNANT NEOPLASM OF BREAST: ICD-10-CM

## 2023-02-23 PROCEDURE — 77067 SCR MAMMO BI INCL CAD: CPT | Mod: 26,HCNC,, | Performed by: RADIOLOGY

## 2023-02-23 PROCEDURE — 77067 MAMMO DIGITAL SCREENING BILAT WITH TOMO: ICD-10-PCS | Mod: 26,HCNC,, | Performed by: RADIOLOGY

## 2023-02-23 PROCEDURE — 77063 BREAST TOMOSYNTHESIS BI: CPT | Mod: 26,HCNC,, | Performed by: RADIOLOGY

## 2023-02-23 PROCEDURE — 77067 SCR MAMMO BI INCL CAD: CPT | Mod: TC,HCNC,PO

## 2023-02-23 PROCEDURE — 77063 MAMMO DIGITAL SCREENING BILAT WITH TOMO: ICD-10-PCS | Mod: 26,HCNC,, | Performed by: RADIOLOGY

## 2023-05-19 ENCOUNTER — PES CALL (OUTPATIENT)
Dept: ADMINISTRATIVE | Facility: CLINIC | Age: 78
End: 2023-05-19
Payer: MEDICARE

## 2023-05-29 DIAGNOSIS — I48.19 PERSISTENT ATRIAL FIBRILLATION: ICD-10-CM

## 2023-05-30 RX ORDER — RIVAROXABAN 20 MG/1
TABLET, FILM COATED ORAL
Qty: 90 TABLET | Refills: 0 | Status: SHIPPED | OUTPATIENT
Start: 2023-05-30 | End: 2023-10-19

## 2023-06-22 ENCOUNTER — TELEPHONE (OUTPATIENT)
Dept: FAMILY MEDICINE | Facility: CLINIC | Age: 78
End: 2023-06-22
Payer: MEDICARE

## 2023-06-22 DIAGNOSIS — I10 ESSENTIAL HYPERTENSION: Primary | ICD-10-CM

## 2023-06-22 DIAGNOSIS — E78.5 BORDERLINE HYPERLIPIDEMIA: ICD-10-CM

## 2023-06-22 DIAGNOSIS — R73.03 PREDIABETES: ICD-10-CM

## 2023-06-30 ENCOUNTER — LAB VISIT (OUTPATIENT)
Dept: LAB | Facility: HOSPITAL | Age: 78
End: 2023-06-30
Attending: INTERNAL MEDICINE
Payer: MEDICARE

## 2023-06-30 ENCOUNTER — PATIENT MESSAGE (OUTPATIENT)
Dept: OTHER | Facility: OTHER | Age: 78
End: 2023-06-30
Payer: MEDICARE

## 2023-06-30 DIAGNOSIS — E78.49 OTHER HYPERLIPIDEMIA: ICD-10-CM

## 2023-06-30 DIAGNOSIS — I10 ESSENTIAL HYPERTENSION: ICD-10-CM

## 2023-06-30 DIAGNOSIS — R73.03 PREDIABETES: ICD-10-CM

## 2023-06-30 LAB
ALBUMIN SERPL BCP-MCNC: 3.6 G/DL (ref 3.5–5.2)
ALP SERPL-CCNC: 60 U/L (ref 55–135)
ALT SERPL W/O P-5'-P-CCNC: 10 U/L (ref 10–44)
ANION GAP SERPL CALC-SCNC: 6 MMOL/L (ref 8–16)
AST SERPL-CCNC: 18 U/L (ref 10–40)
BASOPHILS # BLD AUTO: 0.03 K/UL (ref 0–0.2)
BASOPHILS NFR BLD: 0.5 % (ref 0–1.9)
BILIRUB SERPL-MCNC: 0.7 MG/DL (ref 0.1–1)
BUN SERPL-MCNC: 22 MG/DL (ref 8–23)
CALCIUM SERPL-MCNC: 9.1 MG/DL (ref 8.7–10.5)
CHLORIDE SERPL-SCNC: 105 MMOL/L (ref 95–110)
CHOLEST SERPL-MCNC: 112 MG/DL (ref 120–199)
CHOLEST/HDLC SERPL: 2.6 {RATIO} (ref 2–5)
CO2 SERPL-SCNC: 27 MMOL/L (ref 23–29)
CREAT SERPL-MCNC: 0.8 MG/DL (ref 0.5–1.4)
DIFFERENTIAL METHOD: ABNORMAL
EOSINOPHIL # BLD AUTO: 0.2 K/UL (ref 0–0.5)
EOSINOPHIL NFR BLD: 3.7 % (ref 0–8)
ERYTHROCYTE [DISTWIDTH] IN BLOOD BY AUTOMATED COUNT: 12.5 % (ref 11.5–14.5)
EST. GFR  (NO RACE VARIABLE): >60 ML/MIN/1.73 M^2
ESTIMATED AVG GLUCOSE: 111 MG/DL (ref 68–131)
GLUCOSE SERPL-MCNC: 108 MG/DL (ref 70–110)
HBA1C MFR BLD: 5.5 % (ref 4–5.6)
HCT VFR BLD AUTO: 45.4 % (ref 37–48.5)
HDLC SERPL-MCNC: 43 MG/DL (ref 40–75)
HDLC SERPL: 38.4 % (ref 20–50)
HGB BLD-MCNC: 14.4 G/DL (ref 12–16)
IMM GRANULOCYTES # BLD AUTO: 0.01 K/UL (ref 0–0.04)
IMM GRANULOCYTES NFR BLD AUTO: 0.2 % (ref 0–0.5)
LDLC SERPL CALC-MCNC: 58.4 MG/DL (ref 63–159)
LYMPHOCYTES # BLD AUTO: 2.1 K/UL (ref 1–4.8)
LYMPHOCYTES NFR BLD: 32.8 % (ref 18–48)
MCH RBC QN AUTO: 31.1 PG (ref 27–31)
MCHC RBC AUTO-ENTMCNC: 31.7 G/DL (ref 32–36)
MCV RBC AUTO: 98 FL (ref 82–98)
MONOCYTES # BLD AUTO: 0.5 K/UL (ref 0.3–1)
MONOCYTES NFR BLD: 7.7 % (ref 4–15)
NEUTROPHILS # BLD AUTO: 3.5 K/UL (ref 1.8–7.7)
NEUTROPHILS NFR BLD: 55.1 % (ref 38–73)
NONHDLC SERPL-MCNC: 69 MG/DL
NRBC BLD-RTO: 0 /100 WBC
PLATELET # BLD AUTO: 234 K/UL (ref 150–450)
PMV BLD AUTO: 10.4 FL (ref 9.2–12.9)
POTASSIUM SERPL-SCNC: 4.8 MMOL/L (ref 3.5–5.1)
PROT SERPL-MCNC: 6.6 G/DL (ref 6–8.4)
RBC # BLD AUTO: 4.63 M/UL (ref 4–5.4)
SODIUM SERPL-SCNC: 138 MMOL/L (ref 136–145)
TRIGL SERPL-MCNC: 53 MG/DL (ref 30–150)
TSH SERPL DL<=0.005 MIU/L-ACNC: 1.89 UIU/ML (ref 0.4–4)
WBC # BLD AUTO: 6.25 K/UL (ref 3.9–12.7)

## 2023-06-30 PROCEDURE — 85025 COMPLETE CBC W/AUTO DIFF WBC: CPT | Mod: HCNC | Performed by: INTERNAL MEDICINE

## 2023-06-30 PROCEDURE — 80061 LIPID PANEL: CPT | Mod: HCNC | Performed by: INTERNAL MEDICINE

## 2023-06-30 PROCEDURE — 83036 HEMOGLOBIN GLYCOSYLATED A1C: CPT | Mod: HCNC | Performed by: INTERNAL MEDICINE

## 2023-06-30 PROCEDURE — 80053 COMPREHEN METABOLIC PANEL: CPT | Mod: HCNC | Performed by: INTERNAL MEDICINE

## 2023-06-30 PROCEDURE — 84443 ASSAY THYROID STIM HORMONE: CPT | Mod: HCNC | Performed by: INTERNAL MEDICINE

## 2023-06-30 PROCEDURE — 36415 COLL VENOUS BLD VENIPUNCTURE: CPT | Mod: HCNC,PO | Performed by: INTERNAL MEDICINE

## 2023-07-07 ENCOUNTER — HOSPITAL ENCOUNTER (OUTPATIENT)
Dept: RADIOLOGY | Facility: HOSPITAL | Age: 78
Discharge: HOME | End: 2023-07-07
Attending: INTERNAL MEDICINE
Payer: MEDICARE

## 2023-07-07 ENCOUNTER — PES CALL (OUTPATIENT)
Dept: ADMINISTRATIVE | Facility: CLINIC | Age: 78
End: 2023-07-07
Payer: MEDICARE

## 2023-07-07 ENCOUNTER — OFFICE VISIT (OUTPATIENT)
Dept: FAMILY MEDICINE | Facility: CLINIC | Age: 78
End: 2023-07-07
Payer: MEDICARE

## 2023-07-07 VITALS
HEART RATE: 86 BPM | HEIGHT: 65 IN | BODY MASS INDEX: 29.77 KG/M2 | TEMPERATURE: 98 F | OXYGEN SATURATION: 97 % | WEIGHT: 178.69 LBS | SYSTOLIC BLOOD PRESSURE: 128 MMHG | DIASTOLIC BLOOD PRESSURE: 78 MMHG

## 2023-07-07 DIAGNOSIS — I48.19 PERSISTENT ATRIAL FIBRILLATION: ICD-10-CM

## 2023-07-07 DIAGNOSIS — M54.2 NECK PAIN: ICD-10-CM

## 2023-07-07 DIAGNOSIS — L29.9 ITCHING: ICD-10-CM

## 2023-07-07 DIAGNOSIS — R73.03 PREDIABETES: ICD-10-CM

## 2023-07-07 DIAGNOSIS — Z00.00 ROUTINE MEDICAL EXAM: Primary | ICD-10-CM

## 2023-07-07 DIAGNOSIS — M54.2 NECK PAIN: Primary | ICD-10-CM

## 2023-07-07 DIAGNOSIS — Z86.73 HISTORY OF STROKE: ICD-10-CM

## 2023-07-07 DIAGNOSIS — Z78.0 OSTEOPENIA AFTER MENOPAUSE: ICD-10-CM

## 2023-07-07 DIAGNOSIS — E66.9 OBESITY (BMI 30-39.9): ICD-10-CM

## 2023-07-07 DIAGNOSIS — M85.80 OSTEOPENIA AFTER MENOPAUSE: ICD-10-CM

## 2023-07-07 DIAGNOSIS — L29.9 ITCH: ICD-10-CM

## 2023-07-07 DIAGNOSIS — E78.5 BORDERLINE HYPERLIPIDEMIA: ICD-10-CM

## 2023-07-07 DIAGNOSIS — I10 ESSENTIAL HYPERTENSION: ICD-10-CM

## 2023-07-07 PROCEDURE — 1126F AMNT PAIN NOTED NONE PRSNT: CPT | Mod: HCNC,CPTII,S$GLB, | Performed by: INTERNAL MEDICINE

## 2023-07-07 PROCEDURE — 1101F PT FALLS ASSESS-DOCD LE1/YR: CPT | Mod: HCNC,CPTII,S$GLB, | Performed by: INTERNAL MEDICINE

## 2023-07-07 PROCEDURE — 3288F PR FALLS RISK ASSESSMENT DOCUMENTED: ICD-10-PCS | Mod: HCNC,CPTII,S$GLB, | Performed by: INTERNAL MEDICINE

## 2023-07-07 PROCEDURE — 99397 PER PM REEVAL EST PAT 65+ YR: CPT | Mod: HCNC,S$GLB,, | Performed by: INTERNAL MEDICINE

## 2023-07-07 PROCEDURE — 1160F RVW MEDS BY RX/DR IN RCRD: CPT | Mod: HCNC,CPTII,S$GLB, | Performed by: INTERNAL MEDICINE

## 2023-07-07 PROCEDURE — 99999 PR PBB SHADOW E&M-EST. PATIENT-LVL IV: CPT | Mod: PBBFAC,HCNC,, | Performed by: INTERNAL MEDICINE

## 2023-07-07 PROCEDURE — 3288F FALL RISK ASSESSMENT DOCD: CPT | Mod: HCNC,CPTII,S$GLB, | Performed by: INTERNAL MEDICINE

## 2023-07-07 PROCEDURE — 99999 PR PBB SHADOW E&M-EST. PATIENT-LVL IV: ICD-10-PCS | Mod: PBBFAC,HCNC,, | Performed by: INTERNAL MEDICINE

## 2023-07-07 PROCEDURE — 3074F SYST BP LT 130 MM HG: CPT | Mod: HCNC,CPTII,S$GLB, | Performed by: INTERNAL MEDICINE

## 2023-07-07 PROCEDURE — 1159F PR MEDICATION LIST DOCUMENTED IN MEDICAL RECORD: ICD-10-PCS | Mod: HCNC,CPTII,S$GLB, | Performed by: INTERNAL MEDICINE

## 2023-07-07 PROCEDURE — 72040 X-RAY EXAM NECK SPINE 2-3 VW: CPT | Mod: TC,HCNC,FY,PO

## 2023-07-07 PROCEDURE — 99397 PR PREVENTIVE VISIT,EST,65 & OVER: ICD-10-PCS | Mod: HCNC,S$GLB,, | Performed by: INTERNAL MEDICINE

## 2023-07-07 PROCEDURE — 72040 X-RAY EXAM NECK SPINE 2-3 VW: CPT | Mod: 26,HCNC,, | Performed by: RADIOLOGY

## 2023-07-07 PROCEDURE — 1159F MED LIST DOCD IN RCRD: CPT | Mod: HCNC,CPTII,S$GLB, | Performed by: INTERNAL MEDICINE

## 2023-07-07 PROCEDURE — 1160F PR REVIEW ALL MEDS BY PRESCRIBER/CLIN PHARMACIST DOCUMENTED: ICD-10-PCS | Mod: HCNC,CPTII,S$GLB, | Performed by: INTERNAL MEDICINE

## 2023-07-07 PROCEDURE — 1126F PR PAIN SEVERITY QUANTIFIED, NO PAIN PRESENT: ICD-10-PCS | Mod: HCNC,CPTII,S$GLB, | Performed by: INTERNAL MEDICINE

## 2023-07-07 PROCEDURE — 1101F PR PT FALLS ASSESS DOC 0-1 FALLS W/OUT INJ PAST YR: ICD-10-PCS | Mod: HCNC,CPTII,S$GLB, | Performed by: INTERNAL MEDICINE

## 2023-07-07 PROCEDURE — 3078F PR MOST RECENT DIASTOLIC BLOOD PRESSURE < 80 MM HG: ICD-10-PCS | Mod: HCNC,CPTII,S$GLB, | Performed by: INTERNAL MEDICINE

## 2023-07-07 PROCEDURE — 3074F PR MOST RECENT SYSTOLIC BLOOD PRESSURE < 130 MM HG: ICD-10-PCS | Mod: HCNC,CPTII,S$GLB, | Performed by: INTERNAL MEDICINE

## 2023-07-07 PROCEDURE — 72040 XR CERVICAL SPINE AP LATERAL: ICD-10-PCS | Mod: 26,HCNC,, | Performed by: RADIOLOGY

## 2023-07-07 PROCEDURE — 3078F DIAST BP <80 MM HG: CPT | Mod: HCNC,CPTII,S$GLB, | Performed by: INTERNAL MEDICINE

## 2023-07-07 RX ORDER — LIDOCAINE HYDROCHLORIDE 40 MG/ML
4 SOLUTION TOPICAL
Qty: 50 ML | Refills: 2 | Status: SHIPPED | OUTPATIENT
Start: 2023-07-07 | End: 2023-09-25

## 2023-07-07 NOTE — PROGRESS NOTES
CHIEF COMPLAINT:   Chief Complaint   Patient presents with    Annual Exam    Itching     Several years         HISTORY OF PRESENT ILLNESS:  Gale Fernández is a 78 y.o. female who presents to the clinic today for a routine physical exam. Her last physical exam was approximately 1 years(s) ago.      Objective     PAST MEDICAL HISTORY:  Past Medical History:   Diagnosis Date    Atrial fibrillation     Followed by EP cardiology    Borderline hyperlipidemia     Cataract     History of abnormal Pap smear     More than 20 years ago; status post colposcopy    History of anxiety disorder     History of hypertension     Currently doing okay off medication    Hypertension     Osteopenia     Refuses medication    Overweight(278.02)     Stroke 2019       PAST SURGICAL HISTORY:  Past Surgical History:   Procedure Laterality Date     SECTION, LOW TRANSVERSE      X1    COLONOSCOPY N/A 2020    Procedure: COLONOSCOPY;  Surgeon: Maicol Johnson MD;  Location: [facility] ([address] [place] [address]);  Service: Endoscopy;  Laterality: N/A;  Covid test on . Pt. holding Xarelto for 2 days prior to.EC    left knee meniscus surgery      neck tuck and liposuction      PSVT ablation      RADIOFREQUENCY ABLATION      Skin cancer removal on the left eye         SOCIAL HISTORY:  Social History     Socioeconomic History    Marital status:     Number of children: 1   Occupational History    Occupation: Teacher   Tobacco Use    Smoking status: Former     Types: Cigarettes    Smokeless tobacco: Never   Substance and Sexual Activity    Alcohol use: Yes     Comment: Rarely    Drug use: No       FAMILY HISTORY:  Family History   Problem Relation Age of Onset    Heart disease Mother     Cancer Father         nasopharynx    Breast cancer Cousin     Cancer Cousin         colon cancer    Hypertension Sister     Diabetes Sister     Cancer Sister     Ovarian cancer Sister     Coronary artery disease Brother         s/p stenting    Hypertension  Brother     Rheum arthritis Sister     Hypertension Brother     Hypertension Brother     Heart disease Brother         s/p CABG    Hypertension Brother     Coronary artery disease Brother         s/p stenting    Cancer Brother     No Known Problems Maternal Grandmother     No Known Problems Maternal Grandfather     No Known Problems Paternal Grandmother     No Known Problems Paternal Grandfather     No Known Problems Maternal Aunt     No Known Problems Maternal Uncle     No Known Problems Paternal Aunt     No Known Problems Paternal Uncle     Colon cancer Neg Hx     Amblyopia Neg Hx     Blindness Neg Hx     Cataracts Neg Hx     Glaucoma Neg Hx     Macular degeneration Neg Hx     Retinal detachment Neg Hx     Strabismus Neg Hx     Stroke Neg Hx     Thyroid disease Neg Hx        ALLERGIES AND MEDICATIONS: updated and reviewed.  Review of patient's allergies indicates:   Allergen Reactions    Flecainide Other (See Comments)     Other reaction(s): worsening arrythmia    Multaq [dronedarone]      rash     Medication List with Changes/Refills   Current Medications    ALPRAZOLAM (XANAX) 0.25 MG TABLET    TAKE 1 TABLET BY MOUTH AT BEDTIME AS NEEDED FOR ANXIETRY    AMLODIPINE (NORVASC) 5 MG TABLET    TAKE 1 TABLET EVERY DAY    ATORVASTATIN (LIPITOR) 20 MG TABLET    TAKE 1 TABLET EVERY DAY    FUROSEMIDE (LASIX) 20 MG TABLET    Take 1 tablet (20 mg total) by mouth 2 (two) times daily as needed (Leg Swelling).    GABAPENTIN (NEURONTIN) 100 MG CAPSULE    Take 1 capsule (100 mg total) by mouth 3 (three) times daily.    XARELTO 20 MG TAB    TAKE 1 TABLET (20 MG TOTAL) BY MOUTH EVERY EVENING.   Changed and/or Refilled Medications    Modified Medication Previous Medication    LIDOCAINE HCL 4% (XYLOCAINE) 4 % (40 MG/ML) EXTERNAL SOLUTION lidocaine HCL 4% (XYLOCAINE) 4 % (40 mg/mL) external solution       Apply 4 mLs topically as needed.    Apply 4 mLs topically as needed.          CARE TEAM:  Patient Care Team:  Vickie Hurtado MD  as PCP - General (Internal Medicine)  Helga Mcmullen LPN as Licensed Practical Nurse  Jose Eduardo Stanley MD as Consulting Physician (Electrophysiology)  Radha Banuelos as Digital Medicine Health   India Dumont PharmD as Hyperlipidemia Digital Medicine Clinician  Vickie Hurtado MD as Hyperlipidemia Digital Medicine Responsible Provider (Internal Medicine)  India Dumont PharmD as Hypertension Digital Medicine Clinician (Pharmacist)  Vickie Hurtado MD as Hypertension Digital Medicine Responsible Provider (Internal Medicine)  [employer] as Hypertension Digital Medicine Contract              SCREENING HISTORY:  Health Maintenance         Date Due Completion Date    Influenza Vaccine (1) 09/01/2023 10/19/2022    Override on 9/1/2020: Done    DEXA Scan 10/14/2023 10/14/2021    Override on 7/7/2011: Done    Lipid Panel 06/30/2024 6/30/2023    Hemoglobin A1c 06/30/2024 6/30/2023    TETANUS VACCINE 06/06/2026 6/6/2016    Colonoscopy 08/21/2030 8/21/2020    Override on 3/10/2015: Declined              REVIEW OF SYSTEMS:   The patient reports : good dietary habits.  The patient reports  : that they do not exercise regularly, but stay active.  Review of Systems   Constitutional:  Negative for chills and fever.   HENT:  Negative for congestion.    Respiratory:  Negative for cough and shortness of breath.    Cardiovascular:  Negative for chest pain and leg swelling.   Gastrointestinal:  Negative for abdominal pain.   Genitourinary:  Negative for dysuria.   Skin:  Negative for rash.        She reports that she has had severe itching of the bilateral upper arms for almost 6 months. Has seen dermatology. Is trying many topical creams etc with minimal relief. Has used some topical lidocaine which helped a little. Gabapentin also helps a little.   Hematological:  Negative for adenopathy. Does not bruise/bleed easily.    ROS (Optional)-: no pelvic pain  Breast ROS (Optional)-: negative for breast  "lumps/discharge          Physical Examination: 274}  Vitals:    07/07/23 1314   BP: 128/78   Pulse: 86   Temp: 97.9 °F (36.6 °C)   TempSrc: Oral   SpO2: 97%   Weight: 81.1 kg (178 lb 10.9 oz)   Height: 5' 5" (1.651 m)        Body mass index is 29.73 kg/m².        General appearance - alert, well appearing, and in no distress, overweight  Psychiatric - alert, oriented to person, place, and time, normal behavior, speech, dress, motor activity and thought process  Eyes - pupils equal and reactive, extraocular eye movements intact, sclera anicteric  Mouth - not examined  Neck - supple, no significant adenopathy, carotids upstroke normal bilaterally, no bruits  Lymphatics - no palpable cervical lymphadenopathy  Chest - clear to auscultation, no wheezes, rales or rhonchi, symmetric air entry  Heart - irregularly irregular rhythm with rate controlled   Neurological - alert, normal speech, no focal findings; cranial nerves II through XII intact  Musculoskeletal - patient noted to have Mild-Moderate osteoarthritic changes to both knee joints. No joint effusions noted.  Neck - overall somewhat decreased active ROM with minimal discomfort  Extremities - no pedal edema noted  Skin - normal coloration, no suspicious skin lesions      Labs:  Results for orders placed or performed in visit on 06/30/23   TSH   Result Value Ref Range    TSH 1.886 0.400 - 4.000 uIU/mL   HEMOGLOBIN A1C   Result Value Ref Range    Hemoglobin A1C 5.5 4.0 - 5.6 %    Estimated Avg Glucose 111 68 - 131 mg/dL   CBC W/ AUTO DIFFERENTIAL   Result Value Ref Range    WBC 6.25 3.90 - 12.70 K/uL    RBC 4.63 4.00 - 5.40 M/uL    Hemoglobin 14.4 12.0 - 16.0 g/dL    Hematocrit 45.4 37.0 - 48.5 %    MCV 98 82 - 98 fL    MCH 31.1 (H) 27.0 - 31.0 pg    MCHC 31.7 (L) 32.0 - 36.0 g/dL    RDW 12.5 11.5 - 14.5 %    Platelets 234 150 - 450 K/uL    MPV 10.4 9.2 - 12.9 fL    Immature Granulocytes 0.2 0.0 - 0.5 %    Gran # (ANC) 3.5 1.8 - 7.7 K/uL    Immature Grans (Abs) 0.01 " 0.00 - 0.04 K/uL    Lymph # 2.1 1.0 - 4.8 K/uL    Mono # 0.5 0.3 - 1.0 K/uL    Eos # 0.2 0.0 - 0.5 K/uL    Baso # 0.03 0.00 - 0.20 K/uL    nRBC 0 0 /100 WBC    Gran % 55.1 38.0 - 73.0 %    Lymph % 32.8 18.0 - 48.0 %    Mono % 7.7 4.0 - 15.0 %    Eosinophil % 3.7 0.0 - 8.0 %    Basophil % 0.5 0.0 - 1.9 %    Differential Method Automated    COMPREHENSIVE METABOLIC PANEL   Result Value Ref Range    Sodium 138 136 - 145 mmol/L    Potassium 4.8 3.5 - 5.1 mmol/L    Chloride 105 95 - 110 mmol/L    CO2 27 23 - 29 mmol/L    Glucose 108 70 - 110 mg/dL    BUN 22 8 - 23 mg/dL    Creatinine 0.8 0.5 - 1.4 mg/dL    Calcium 9.1 8.7 - 10.5 mg/dL    Total Protein 6.6 6.0 - 8.4 g/dL    Albumin 3.6 3.5 - 5.2 g/dL    Total Bilirubin 0.7 0.1 - 1.0 mg/dL    Alkaline Phosphatase 60 55 - 135 U/L    AST 18 10 - 40 U/L    ALT 10 10 - 44 U/L    eGFR >60.0 >60 mL/min/1.73 m^2    Anion Gap 6 (L) 8 - 16 mmol/L   LIPID PANEL   Result Value Ref Range    Cholesterol 112 (L) 120 - 199 mg/dL    Triglycerides 53 30 - 150 mg/dL    HDL 43 40 - 75 mg/dL    LDL Cholesterol 58.4 (L) 63.0 - 159.0 mg/dL    HDL/Cholesterol Ratio 38.4 20.0 - 50.0 %    Total Cholesterol/HDL Ratio 2.6 2.0 - 5.0    Non-HDL Cholesterol 69 mg/dL          ASSESSMENT AND PLAN:  274}  1. Routine medical exam  Counseled on age appropriate medical preventative services including age appropriate cancer screenings, age appropriate eye and dental exams, over all nutritional health, need for a consistent exercise regimen, and an over all push towards maintaining a vigorous and active lifestyle.  Counseled on age appropriate vaccines and discussed upcoming health care needs based on age/gender. Discussed good sleep hygiene and stress management.    2. Essential hypertension  BP Readings from Last 1 Encounters:   07/07/23 128/78      The patient reports that her digital medicine team has noticed that her blood pressures have been low/normal. She will stop her amlodipine and see what happens.  If needed, we may restart amlodipine at 2.5mg daily.  Discussed sodium restriction, maintaining ideal body weight and regular exercise program as physiologic means to continue to achieve blood pressure control in addition to medication compliance.  Patient was educated that both decongestant and anti-inflammatory medication may raise blood pressure.  The patient is active on the digital hypertension program.    3. Persistent atrial fibrillation  The current medical regimen is effective;  continue present plan and medications.   Followed by: Cardiology.     4. Borderline hyperlipidemia  Lab Results   Component Value Date    CHOL 112 (L) 06/30/2023     Lab Results   Component Value Date    HDL 43 06/30/2023     Lab Results   Component Value Date    LDLCALC 58.4 (L) 06/30/2023     Lab Results   Component Value Date    TRIG 53 06/30/2023     Lab Results   Component Value Date    LDLCALC 58.4 (L) 06/30/2023     We discussed low fat diet and regular exercise.The current medical regimen is effective;  continue present plan and medications.     5. Prediabetes  Lab Results   Component Value Date    HGBA1C 5.5 06/30/2023     Stable. We discussed low sugar/low carbohydrate diet and regular exercise to prevent progression. No need for prescription medication at this time.  Check A1c yearly.    6. Osteopenia after menopause  We discussed adequate calcium and vitamin D supplementation. We discussed fall precautions. She is up to date on her BMD. No need for prescription medication at this time.  Overview:  (Declined medication in the past).  6/2019 - No need for Rx tx at this time.  10/21 - No need for Rx tx at this time.        Orders:  -     DXA Bone Density Axial Skeleton 1 or more sites; Future; Expected date: 10/15/2023    7. History of stroke  Stable/asymptomatic.  We discussed risk factor reduction.  Overview:  - 9/2019 - right middle cerebral artery      8. Obesity (BMI 30-39.9)  BMI Readings from Last 3 Encounters:    07/07/23 29.73 kg/m²   10/18/22 29.29 kg/m²   08/17/22 29.31 kg/m²     The patient is asked to continue to make an attempt to improve diet and exercise patterns to aid in medical management of this problem.     9. Itch/10. Neck pain  I am wondering if there is a pinch in her neck causing the itch. Gabapentin seems to help her symptoms. I will check an X-ray of her neck. May consider PT.  -     LIDOcaine HCL 4% (XYLOCAINE) 4 % (40 mg/mL) external solution; Apply 4 mLs topically as needed.  Dispense: 50 mL; Refill: 2  -     X-Ray Cervical Spine AP And Lateral; Future; Expected date: 07/07/2023             Orders Placed This Encounter   Procedures    X-Ray Cervical Spine AP And Lateral    DXA Bone Density Axial Skeleton 1 or more sites      Follow up in about 1 year (around 7/7/2024), or if symptoms worsen or fail to improve, for annual exam. or sooner as needed.

## 2023-07-13 ENCOUNTER — TELEPHONE (OUTPATIENT)
Dept: FAMILY MEDICINE | Facility: CLINIC | Age: 78
End: 2023-07-13

## 2023-07-13 NOTE — TELEPHONE ENCOUNTER
----- Message from Taylor Garner sent at 7/13/2023 11:50 AM CDT -----  Regarding: Pharmacy Call Back  Type:  Pharmacy Calling to Clarify an RX    Name of Caller: Marika     Pharmacy Name: [facility]     Prescription Name: LIDOcaine HCL 4% (XYLOCAINE) 4 % (40 mg/mL) external solution    What do they need to clarify? Needs to know the maximum daily usage for insurance purposes     Can you be contacted via [facility]? No     Best Call Back Number: [phone]

## 2023-07-13 NOTE — TELEPHONE ENCOUNTER
Would recommend we clarify prescription to:  - use 3 cc topical bid prn; max dose of 6cc total use per day

## 2023-07-18 DIAGNOSIS — F41.9 ANXIETY: ICD-10-CM

## 2023-07-18 RX ORDER — ALPRAZOLAM 0.25 MG/1
TABLET ORAL
Qty: 30 TABLET | Refills: 0 | Status: SHIPPED | OUTPATIENT
Start: 2023-07-18

## 2023-07-18 NOTE — TELEPHONE ENCOUNTER
No care due was identified.  [facility] Embedded Care Due Messages. Reference number: [id number].   7/18/2023 12:57:36 PM CDT

## 2023-07-19 ENCOUNTER — PATIENT MESSAGE (OUTPATIENT)
Dept: FAMILY MEDICINE | Facility: CLINIC | Age: 78
End: 2023-07-19
Payer: MEDICARE

## 2023-08-02 ENCOUNTER — OFFICE VISIT (OUTPATIENT)
Dept: DERMATOLOGY | Facility: CLINIC | Age: 78
End: 2023-08-02
Payer: MEDICARE

## 2023-08-02 VITALS — BODY MASS INDEX: 29.62 KG/M2 | WEIGHT: 178 LBS

## 2023-08-02 DIAGNOSIS — R20.2 NOTALGIA PARESTHETICA: ICD-10-CM

## 2023-08-02 DIAGNOSIS — L72.0 EPIDERMAL INCLUSION CYST: Primary | ICD-10-CM

## 2023-08-02 DIAGNOSIS — L82.1 SEBORRHEIC KERATOSES: ICD-10-CM

## 2023-08-02 DIAGNOSIS — L81.8 IDIOPATHIC GUTTATE HYPOMELANOSIS: ICD-10-CM

## 2023-08-02 PROCEDURE — 1159F PR MEDICATION LIST DOCUMENTED IN MEDICAL RECORD: ICD-10-PCS | Mod: HCNC,CPTII,S$GLB, | Performed by: DERMATOLOGY

## 2023-08-02 PROCEDURE — 99214 OFFICE O/P EST MOD 30 MIN: CPT | Mod: HCNC,S$GLB,, | Performed by: DERMATOLOGY

## 2023-08-02 PROCEDURE — 1160F RVW MEDS BY RX/DR IN RCRD: CPT | Mod: HCNC,CPTII,S$GLB, | Performed by: DERMATOLOGY

## 2023-08-02 PROCEDURE — 99214 PR OFFICE/OUTPT VISIT, EST, LEVL IV, 30-39 MIN: ICD-10-PCS | Mod: HCNC,S$GLB,, | Performed by: DERMATOLOGY

## 2023-08-02 PROCEDURE — 1126F AMNT PAIN NOTED NONE PRSNT: CPT | Mod: HCNC,CPTII,S$GLB, | Performed by: DERMATOLOGY

## 2023-08-02 PROCEDURE — 99999 PR PBB SHADOW E&M-EST. PATIENT-LVL III: ICD-10-PCS | Mod: PBBFAC,HCNC,, | Performed by: DERMATOLOGY

## 2023-08-02 PROCEDURE — 1159F MED LIST DOCD IN RCRD: CPT | Mod: HCNC,CPTII,S$GLB, | Performed by: DERMATOLOGY

## 2023-08-02 PROCEDURE — 1160F PR REVIEW ALL MEDS BY PRESCRIBER/CLIN PHARMACIST DOCUMENTED: ICD-10-PCS | Mod: HCNC,CPTII,S$GLB, | Performed by: DERMATOLOGY

## 2023-08-02 PROCEDURE — 1126F PR PAIN SEVERITY QUANTIFIED, NO PAIN PRESENT: ICD-10-PCS | Mod: HCNC,CPTII,S$GLB, | Performed by: DERMATOLOGY

## 2023-08-02 PROCEDURE — 99999 PR PBB SHADOW E&M-EST. PATIENT-LVL III: CPT | Mod: PBBFAC,HCNC,, | Performed by: DERMATOLOGY

## 2023-08-02 RX ORDER — CLINDAMYCIN PHOSPHATE 10 UG/ML
LOTION TOPICAL
Qty: 60 ML | Refills: 3 | Status: SHIPPED | OUTPATIENT
Start: 2023-08-02 | End: 2023-09-25

## 2023-08-02 NOTE — PROGRESS NOTES
Subjective:      Patient ID:  Gale Fernández is a 78 y.o. female who presents for   Chief Complaint   Patient presents with    Spot     Arms and chest     Has spots on her which her daughter is concerned about.  Also small cyst on chest which drains some, lesions on left upper chest, and still with notalgia paresthetica.         Review of Systems    Objective:   Physical Exam   Skin:   Areas Examined (abnormalities noted in diagram):   Head / Face Inspection Performed  Neck Inspection Performed  Chest / Axilla Inspection Performed  RUE Inspected  LUE Inspection Performed            Diagram Legend     Erythematous scaling macule/papule c/w actinic keratosis       Vascular papule c/w angioma      Pigmented verrucoid papule/plaque c/w seborrheic keratosis      Yellow umbilicated papule c/w sebaceous hyperplasia      Irregularly shaped tan macule c/w lentigo     1-2 mm smooth white papules consistent with Milia      Movable subcutaneous cyst with punctum c/w epidermal inclusion cyst      Subcutaneous movable cyst c/w pilar cyst      Firm pink to brown papule c/w dermatofibroma      Pedunculated fleshy papule(s) c/w skin tag(s)      Evenly pigmented macule c/w junctional nevus     Mildly variegated pigmented, slightly irregular-bordered macule c/w mildly atypical nevus      Flesh colored to evenly pigmented papule c/w intradermal nevus       Pink pearly papule/plaque c/w basal cell carcinoma      Erythematous hyperkeratotic cursted plaque c/w SCC      Surgical scar with no sign of skin cancer recurrence      Open and closed comedones      Inflammatory papules and pustules      Verrucoid papule consistent consistent with wart     Erythematous eczematous patches and plaques     Dystrophic onycholytic nail with subungual debris c/w onychomycosis     Umbilicated papule    Erythematous-base heme-crusted tan verrucoid plaque consistent with inflamed seborrheic keratosis     Erythematous Silvery Scaling Plaque c/w Psoriasis      See annotation      Assessment / Plan:        Epidermal inclusion cyst  -     clindamycin (CLEOCIN T) 1 % lotion; Use bid for spot on chest  Dispense: 60 mL; Refill: 3  Call if would like removed    Idiopathic guttate hypomelanosis  Reassurance  sunscreen    Notalgia paresthetica  Cont dermeleve, can use cold from fridge    Seborrheic keratoses  Seborrheic keratosis scattered, told benign no treatment needed.                 Follow up in about 1 year (around 8/2/2024).

## 2023-08-02 NOTE — PROGRESS NOTES
Subjective:      Patient ID:  Gale Fernández is a 78 y.o. female who presents for   Chief Complaint   Patient presents with    Spot     Arms and chest     Has spots on her which her daughter is concerned about.  Also small cyst on chest which drains some, lesions on left upper chest, and still with notalgia paresthetica.     Spot - Initial  Affected locations: left arm, right arm and chest  Duration: 2 months  Signs / symptoms: itching  Aggravated by: nothing  Relieving factors/Treatments tried: nothing    Review of Systems   Constitutional:  Negative for fever, chills, weight loss, weight gain, fatigue, night sweats and malaise.   Skin:  Positive for wears hat. Negative for daily sunscreen use and activity-related sunscreen use.   Hematologic/Lymphatic: Does not bruise/bleed easily.       Objective:   Physical Exam   Constitutional: She appears well-developed and well-nourished.   Neurological: She is alert and oriented to person, place, and time.   Psychiatric: She has a normal mood and affect.   Skin:   Areas Examined (abnormalities noted in diagram):   Head / Face Inspection Performed  Neck Inspection Performed  Chest / Axilla Inspection Performed  Abdomen Inspection Performed  RUE Inspected  LUE Inspection Performed            Diagram Legend     Erythematous scaling macule/papule c/w actinic keratosis       Vascular papule c/w angioma      Pigmented verrucoid papule/plaque c/w seborrheic keratosis      Yellow umbilicated papule c/w sebaceous hyperplasia      Irregularly shaped tan macule c/w lentigo     1-2 mm smooth white papules consistent with Milia      Movable subcutaneous cyst with punctum c/w epidermal inclusion cyst      Subcutaneous movable cyst c/w pilar cyst      Firm pink to brown papule c/w dermatofibroma      Pedunculated fleshy papule(s) c/w skin tag(s)      Evenly pigmented macule c/w junctional nevus     Mildly variegated pigmented, slightly irregular-bordered macule c/w mildly atypical nevus       Flesh colored to evenly pigmented papule c/w intradermal nevus       Pink pearly papule/plaque c/w basal cell carcinoma      Erythematous hyperkeratotic cursted plaque c/w SCC      Surgical scar with no sign of skin cancer recurrence      Open and closed comedones      Inflammatory papules and pustules      Verrucoid papule consistent consistent with wart     Erythematous eczematous patches and plaques     Dystrophic onycholytic nail with subungual debris c/w onychomycosis     Umbilicated papule    Erythematous-base heme-crusted tan verrucoid plaque consistent with inflamed seborrheic keratosis     Erythematous Silvery Scaling Plaque c/w Psoriasis     See annotation      Assessment / Plan:        Epidermal inclusion cyst  -     clindamycin (CLEOCIN T) 1 % lotion; Use bid for spot on chest  Dispense: 60 mL; Refill: 3  Call if desires removal    Idiopathic guttate hypomelanosis  Reassurance  sunscreen      Notalgia paresthetica  Cont dermeleve, can use cold from fridge    Seborrheic keratoses  Seborrheic keratosis scattered, told benign no treatment needed.                 Follow up in about 1 year (around 8/2/2024).

## 2023-09-12 ENCOUNTER — PATIENT MESSAGE (OUTPATIENT)
Dept: FAMILY MEDICINE | Facility: CLINIC | Age: 78
End: 2023-09-12
Payer: MEDICARE

## 2023-09-12 NOTE — TELEPHONE ENCOUNTER
Left a voicemail message to inform the Patient about the [facility] appointment and to schedule.  Requested the Patient to call the office back to be schedule.  Sent a detailed message thru [employer] as well.

## 2023-09-25 ENCOUNTER — OFFICE VISIT (OUTPATIENT)
Dept: FAMILY MEDICINE | Facility: CLINIC | Age: 78
End: 2023-09-25
Payer: MEDICARE

## 2023-09-25 VITALS
HEART RATE: 96 BPM | OXYGEN SATURATION: 96 % | DIASTOLIC BLOOD PRESSURE: 84 MMHG | TEMPERATURE: 100 F | WEIGHT: 173.06 LBS | BODY MASS INDEX: 28.8 KG/M2 | SYSTOLIC BLOOD PRESSURE: 132 MMHG

## 2023-09-25 DIAGNOSIS — Z23 INFLUENZA VACCINE NEEDED: ICD-10-CM

## 2023-09-25 DIAGNOSIS — R09.81 SINUS CONGESTION: ICD-10-CM

## 2023-09-25 DIAGNOSIS — R30.0 DYSURIA: Primary | ICD-10-CM

## 2023-09-25 LAB
BACTERIA #/AREA URNS AUTO: ABNORMAL /HPF
BILIRUB SERPL-MCNC: ABNORMAL MG/DL
BILIRUB UR QL STRIP: NEGATIVE
BLOOD URINE, POC: ABNORMAL
CLARITY UR REFRACT.AUTO: ABNORMAL
CLARITY, POC UA: CLEAR
COLOR UR AUTO: YELLOW
COLOR, POC UA: YELLOW
GLUCOSE UR QL STRIP: NEGATIVE
GLUCOSE UR QL STRIP: NORMAL
HGB UR QL STRIP: ABNORMAL
HYALINE CASTS UR QL AUTO: 0 /LPF
KETONES UR QL STRIP: ABNORMAL
KETONES UR QL STRIP: NEGATIVE
LEUKOCYTE ESTERASE UR QL STRIP: ABNORMAL
LEUKOCYTE ESTERASE URINE, POC: ABNORMAL
MICROSCOPIC COMMENT: ABNORMAL
NITRITE UR QL STRIP: NEGATIVE
NITRITE, POC UA: ABNORMAL
PH UR STRIP: 6 [PH] (ref 5–8)
PH, POC UA: 5
PROT UR QL STRIP: ABNORMAL
PROTEIN, POC: ABNORMAL
RBC #/AREA URNS AUTO: >100 /HPF (ref 0–4)
SP GR UR STRIP: 1.02 (ref 1–1.03)
SPECIFIC GRAVITY, POC UA: 1.02
SQUAMOUS #/AREA URNS AUTO: 4 /HPF
URN SPEC COLLECT METH UR: ABNORMAL
UROBILINOGEN, POC UA: NORMAL
WBC #/AREA URNS AUTO: >100 /HPF (ref 0–5)

## 2023-09-25 PROCEDURE — 99213 PR OFFICE/OUTPT VISIT, EST, LEVL III, 20-29 MIN: ICD-10-PCS | Mod: HCNC,25,S$GLB, | Performed by: INTERNAL MEDICINE

## 2023-09-25 PROCEDURE — 81002 URINALYSIS NONAUTO W/O SCOPE: CPT | Mod: HCNC,S$GLB,, | Performed by: INTERNAL MEDICINE

## 2023-09-25 PROCEDURE — 3075F PR MOST RECENT SYSTOLIC BLOOD PRESS GE 130-139MM HG: ICD-10-PCS | Mod: HCNC,CPTII,S$GLB, | Performed by: INTERNAL MEDICINE

## 2023-09-25 PROCEDURE — 1159F MED LIST DOCD IN RCRD: CPT | Mod: HCNC,CPTII,S$GLB, | Performed by: INTERNAL MEDICINE

## 2023-09-25 PROCEDURE — 87077 CULTURE AEROBIC IDENTIFY: CPT | Mod: HCNC | Performed by: INTERNAL MEDICINE

## 2023-09-25 PROCEDURE — 87186 SC STD MICRODIL/AGAR DIL: CPT | Mod: HCNC | Performed by: INTERNAL MEDICINE

## 2023-09-25 PROCEDURE — 90694 VACC AIIV4 NO PRSRV 0.5ML IM: CPT | Mod: HCNC,S$GLB,, | Performed by: INTERNAL MEDICINE

## 2023-09-25 PROCEDURE — 3079F DIAST BP 80-89 MM HG: CPT | Mod: HCNC,CPTII,S$GLB, | Performed by: INTERNAL MEDICINE

## 2023-09-25 PROCEDURE — 81002 POCT URINE DIPSTICK WITHOUT MICROSCOPE: ICD-10-PCS | Mod: HCNC,S$GLB,, | Performed by: INTERNAL MEDICINE

## 2023-09-25 PROCEDURE — 3075F SYST BP GE 130 - 139MM HG: CPT | Mod: HCNC,CPTII,S$GLB, | Performed by: INTERNAL MEDICINE

## 2023-09-25 PROCEDURE — 1159F PR MEDICATION LIST DOCUMENTED IN MEDICAL RECORD: ICD-10-PCS | Mod: HCNC,CPTII,S$GLB, | Performed by: INTERNAL MEDICINE

## 2023-09-25 PROCEDURE — 87086 URINE CULTURE/COLONY COUNT: CPT | Mod: HCNC | Performed by: INTERNAL MEDICINE

## 2023-09-25 PROCEDURE — 99999 PR PBB SHADOW E&M-EST. PATIENT-LVL III: CPT | Mod: PBBFAC,HCNC,, | Performed by: INTERNAL MEDICINE

## 2023-09-25 PROCEDURE — 1101F PT FALLS ASSESS-DOCD LE1/YR: CPT | Mod: HCNC,CPTII,S$GLB, | Performed by: INTERNAL MEDICINE

## 2023-09-25 PROCEDURE — 99999 PR PBB SHADOW E&M-EST. PATIENT-LVL III: ICD-10-PCS | Mod: PBBFAC,HCNC,, | Performed by: INTERNAL MEDICINE

## 2023-09-25 PROCEDURE — 87088 URINE BACTERIA CULTURE: CPT | Mod: HCNC | Performed by: INTERNAL MEDICINE

## 2023-09-25 PROCEDURE — 1160F PR REVIEW ALL MEDS BY PRESCRIBER/CLIN PHARMACIST DOCUMENTED: ICD-10-PCS | Mod: HCNC,CPTII,S$GLB, | Performed by: INTERNAL MEDICINE

## 2023-09-25 PROCEDURE — 3079F PR MOST RECENT DIASTOLIC BLOOD PRESSURE 80-89 MM HG: ICD-10-PCS | Mod: HCNC,CPTII,S$GLB, | Performed by: INTERNAL MEDICINE

## 2023-09-25 PROCEDURE — 3288F PR FALLS RISK ASSESSMENT DOCUMENTED: ICD-10-PCS | Mod: HCNC,CPTII,S$GLB, | Performed by: INTERNAL MEDICINE

## 2023-09-25 PROCEDURE — 1101F PR PT FALLS ASSESS DOC 0-1 FALLS W/OUT INJ PAST YR: ICD-10-PCS | Mod: HCNC,CPTII,S$GLB, | Performed by: INTERNAL MEDICINE

## 2023-09-25 PROCEDURE — 90694 FLU VACCINE - QUADRIVALENT - ADJUVANTED: ICD-10-PCS | Mod: HCNC,S$GLB,, | Performed by: INTERNAL MEDICINE

## 2023-09-25 PROCEDURE — G0008 ADMIN INFLUENZA VIRUS VAC: HCPCS | Mod: HCNC,S$GLB,, | Performed by: INTERNAL MEDICINE

## 2023-09-25 PROCEDURE — G0008 FLU VACCINE - QUADRIVALENT - ADJUVANTED: ICD-10-PCS | Mod: HCNC,S$GLB,, | Performed by: INTERNAL MEDICINE

## 2023-09-25 PROCEDURE — 99213 OFFICE O/P EST LOW 20 MIN: CPT | Mod: HCNC,25,S$GLB, | Performed by: INTERNAL MEDICINE

## 2023-09-25 PROCEDURE — 81001 URINALYSIS AUTO W/SCOPE: CPT | Mod: HCNC | Performed by: INTERNAL MEDICINE

## 2023-09-25 PROCEDURE — 3288F FALL RISK ASSESSMENT DOCD: CPT | Mod: HCNC,CPTII,S$GLB, | Performed by: INTERNAL MEDICINE

## 2023-09-25 PROCEDURE — 1160F RVW MEDS BY RX/DR IN RCRD: CPT | Mod: HCNC,CPTII,S$GLB, | Performed by: INTERNAL MEDICINE

## 2023-09-25 RX ORDER — SULFAMETHOXAZOLE AND TRIMETHOPRIM 800; 160 MG/1; MG/1
1 TABLET ORAL 2 TIMES DAILY
Qty: 10 TABLET | Refills: 0 | Status: SHIPPED | OUTPATIENT
Start: 2023-09-25 | End: 2023-09-30

## 2023-09-25 RX ORDER — FLUTICASONE PROPIONATE 50 MCG
1 SPRAY, SUSPENSION (ML) NASAL DAILY PRN
Qty: 15.8 ML | Refills: 1 | Status: SHIPPED | OUTPATIENT
Start: 2023-09-25

## 2023-09-25 NOTE — ASSESSMENT & PLAN NOTE
Acute, not resolved, no systemic symptoms   Dysuria, frequency and incontinence   Had uti in 2021 and responded well to Bactrim   Did not respond well to Pyridium   POCT dipstick: nitrate neg, leukocyte ++, blood ++    - follow up on UA and culture   - Bactrim DS BID for 5 days

## 2023-09-25 NOTE — PROGRESS NOTES
Health Maintenance Due   Topic     COVID-19 Vaccine (6 - Moderna risk series) Not offered at this office    Influenza Vaccine (1) Pt agree to get today    DEXA Scan  Consult pcp

## 2023-09-25 NOTE — PROGRESS NOTES
"Chief Complaint: Urinary Tract Infection (Last time pt came she was given SMZ) and Ear Problem (Pt has been having trouble with ears being clogged (feels like when on airplane), has been going on for a while, on and off)      Gale Fernández  is a 78 y.o. year old with a PMH of  has a past medical history of Atrial fibrillation, Borderline hyperlipidemia, Cataract, History of abnormal Pap smear, History of anxiety disorder, History of hypertension, Hypertension, Osteopenia, Overweight(278.02), and Stroke (2019). who presents today for UTI    She has had symptoms for 3 days. Patient also complains of  burning pain when urinating, increased urinary frequency, lower abd discomfort and incontience (urinary) . Patient denies back pain, cough, fever, sorethroat, and vaginal discharge. Patient does not have a history of recurrent UTI. Patient does not have a history of pyelonephritis.     She also reports occasional feeling of "stuffed" ears. Usually on the left and associated with left sinus pain. She reports tearing of her right eye as well. Antihistamines have not helped.       Past Surgical History:   Procedure Laterality Date     SECTION, LOW TRANSVERSE      X1    COLONOSCOPY N/A 2020    Procedure: COLONOSCOPY;  Surgeon: Maicol Johnson MD;  Location: [facility] ([address] [place] [address]);  Service: Endoscopy;  Laterality: N/A;  Covid test on . Pt. holding Xarelto for 2 days prior to.EC    left knee meniscus surgery      neck tuck and liposuction      PSVT ablation      RADIOFREQUENCY ABLATION  2004    Skin cancer removal on the left eye          Family History   Problem Relation Age of Onset    Heart disease Mother     Cancer Father         nasopharynx    Breast cancer Cousin     Cancer Cousin         colon cancer    Hypertension Sister     Diabetes Sister     Cancer Sister     Ovarian cancer Sister     Coronary artery disease Brother         s/p stenting    Hypertension Brother     Rheum arthritis Sister     " Hypertension Brother     Hypertension Brother     Heart disease Brother         s/p CABG    Hypertension Brother     Coronary artery disease Brother         s/p stenting    Cancer Brother     No Known Problems Maternal Grandmother     No Known Problems Maternal Grandfather     No Known Problems Paternal Grandmother     No Known Problems Paternal Grandfather     No Known Problems Maternal Aunt     No Known Problems Maternal Uncle     No Known Problems Paternal Aunt     No Known Problems Paternal Uncle     Colon cancer Neg Hx     Amblyopia Neg Hx     Blindness Neg Hx     Cataracts Neg Hx     Glaucoma Neg Hx     Macular degeneration Neg Hx     Retinal detachment Neg Hx     Strabismus Neg Hx     Stroke Neg Hx     Thyroid disease Neg Hx           Current Outpatient Medications:     ALPRAZolam (XANAX) 0.25 MG tablet, TAKE 1 TABLET BY MOUTH AT BEDTIME AS NEEDED FOR ANXIETRY, Disp: 30 tablet, Rfl: 0    amLODIPine (NORVASC) 5 MG tablet, TAKE 1 TABLET EVERY DAY, Disp: 90 tablet, Rfl: 1    atorvastatin (LIPITOR) 20 MG tablet, TAKE 1 TABLET EVERY DAY, Disp: 90 tablet, Rfl: 1    furosemide (LASIX) 20 MG tablet, Take 1 tablet (20 mg total) by mouth 2 (two) times daily as needed (Leg Swelling)., Disp: 90 tablet, Rfl: 2    gabapentin (NEURONTIN) 100 MG capsule, Take 1 capsule (100 mg total) by mouth 3 (three) times daily., Disp: 90 capsule, Rfl: 11    XARELTO 20 mg Tab, TAKE 1 TABLET (20 MG TOTAL) BY MOUTH EVERY EVENING., Disp: 90 tablet, Rfl: 0    fluticasone propionate (FLONASE) 50 mcg/actuation nasal spray, 1 spray (50 mcg total) by Each Nostril route daily as needed for Rhinitis., Disp: 15.8 mL, Rfl: 1    sulfamethoxazole-trimethoprim 800-160mg (BACTRIM DS) 800-160 mg Tab, Take 1 tablet by mouth 2 (two) times daily. for 5 days, Disp: 10 tablet, Rfl: 0     Review of Systems   Constitutional:  Negative for chills, diaphoresis, fever and malaise/fatigue.   HENT:  Positive for sinus pain (occasional, left sided). Negative for ear  discharge, ear pain, hearing loss, sore throat and tinnitus.    Eyes:  Negative for blurred vision, discharge and redness.   Respiratory:  Negative for cough and shortness of breath.    Cardiovascular:  Negative for palpitations.   Gastrointestinal:  Negative for diarrhea and nausea.   Genitourinary:  Positive for dysuria, frequency and urgency. Negative for flank pain and hematuria.        Urinary incontinence    Musculoskeletal:  Negative for myalgias.   Neurological:  Negative for headaches.        Objective:      Vitals:    09/25/23 0948   BP: 132/84   Pulse: 96   Temp: 99.6 °F (37.6 °C)       Physical Exam  Vitals and nursing note reviewed.   Constitutional:       Appearance: Normal appearance.   HENT:      Head: Normocephalic and atraumatic.      Right Ear: Tympanic membrane, ear canal and external ear normal. There is no impacted cerumen.      Left Ear: Tympanic membrane, ear canal and external ear normal. There is no impacted cerumen.      Nose: Nose normal.      Mouth/Throat:      Mouth: Mucous membranes are moist.      Pharynx: Oropharynx is clear. No oropharyngeal exudate or posterior oropharyngeal erythema.   Cardiovascular:      Rate and Rhythm: Normal rate. Rhythm irregular.      Heart sounds: No murmur heard.  Pulmonary:      Effort: Pulmonary effort is normal.      Breath sounds: Normal breath sounds. No wheezing or rales.   Abdominal:      Palpations: Abdomen is soft.      Tenderness: There is no abdominal tenderness. There is no right CVA tenderness or left CVA tenderness.   Musculoskeletal:      Right lower leg: No edema.      Left lower leg: No edema.   Skin:     General: Skin is warm and dry.   Neurological:      General: No focal deficit present.      Mental Status: She is alert and oriented to person, place, and time.          Assessment:       1. Dysuria    2. Sinus congestion    3. Influenza vaccine needed          Plan:   1. Dysuria  Assessment & Plan:  Acute, not resolved, no systemic  "symptoms   Dysuria, frequency and incontinence   Had uti in 2021 and responded well to Bactrim   Did not respond well to Pyridium   POCT dipstick: nitrate neg, leukocyte ++, blood ++    - follow up on UA and culture   - Bactrim DS BID for 5 days     Orders:  -     POCT urine dipstick without microscope  -     Urinalysis  -     Urine culture  -     sulfamethoxazole-trimethoprim 800-160mg (BACTRIM DS) 800-160 mg Tab; Take 1 tablet by mouth 2 (two) times daily. for 5 days  Dispense: 10 tablet; Refill: 0    2. Sinus congestion  Assessment & Plan:  She also reports occasional feeling of "stuffed" ears. Usually on the left and associated with left sinus pain. She reports tearing of her right eye as well. Antihistamines have not helped.     - flonase daily PRN     Orders:  -     fluticasone propionate (FLONASE) 50 mcg/actuation nasal spray; 1 spray (50 mcg total) by Each Nostril route daily as needed for Rhinitis.  Dispense: 15.8 mL; Refill: 1    3. Influenza vaccine needed  -     Influenza (FLUAD) - Quadrivalent (Adjuvanted) *Preferred* (65+) (PF)         No follow-ups on file.           "

## 2023-09-25 NOTE — ASSESSMENT & PLAN NOTE
"She also reports occasional feeling of "stuffed" ears. Usually on the left and associated with left sinus pain. She reports tearing of her right eye as well. Antihistamines have not helped.     - flonase daily PRN   "

## 2023-09-28 ENCOUNTER — PATIENT MESSAGE (OUTPATIENT)
Dept: FAMILY MEDICINE | Facility: CLINIC | Age: 78
End: 2023-09-28
Payer: MEDICARE

## 2023-09-28 LAB — BACTERIA UR CULT: ABNORMAL

## 2023-10-09 ENCOUNTER — LAB VISIT (OUTPATIENT)
Dept: LAB | Facility: HOSPITAL | Age: 78
End: 2023-10-09
Attending: PHYSICIAN ASSISTANT
Payer: MEDICARE

## 2023-10-09 DIAGNOSIS — R30.0 DYSURIA: Primary | ICD-10-CM

## 2023-10-09 DIAGNOSIS — R30.0 DYSURIA: ICD-10-CM

## 2023-10-09 PROCEDURE — 87086 URINE CULTURE/COLONY COUNT: CPT | Mod: HCNC | Performed by: INTERNAL MEDICINE

## 2023-10-09 PROCEDURE — 81001 URINALYSIS AUTO W/SCOPE: CPT | Mod: HCNC | Performed by: INTERNAL MEDICINE

## 2023-10-10 LAB
BACTERIA #/AREA URNS AUTO: ABNORMAL /HPF
BILIRUB UR QL STRIP: NEGATIVE
CLARITY UR REFRACT.AUTO: ABNORMAL
COLOR UR AUTO: YELLOW
GLUCOSE UR QL STRIP: NEGATIVE
HGB UR QL STRIP: ABNORMAL
HYALINE CASTS UR QL AUTO: 2 /LPF
KETONES UR QL STRIP: NEGATIVE
LEUKOCYTE ESTERASE UR QL STRIP: ABNORMAL
MICROSCOPIC COMMENT: ABNORMAL
NITRITE UR QL STRIP: NEGATIVE
PH UR STRIP: 6 [PH] (ref 5–8)
PROT UR QL STRIP: ABNORMAL
RBC #/AREA URNS AUTO: 32 /HPF (ref 0–4)
SP GR UR STRIP: 1.02 (ref 1–1.03)
SQUAMOUS #/AREA URNS AUTO: 2 /HPF
URN SPEC COLLECT METH UR: ABNORMAL
WBC #/AREA URNS AUTO: >100 /HPF (ref 0–5)

## 2023-10-11 ENCOUNTER — TELEPHONE (OUTPATIENT)
Dept: FAMILY MEDICINE | Facility: CLINIC | Age: 78
End: 2023-10-11
Payer: MEDICARE

## 2023-10-11 DIAGNOSIS — N39.46 MIXED INCONTINENCE URGE AND STRESS: Primary | ICD-10-CM

## 2023-10-11 LAB — BACTERIA UR CULT: NO GROWTH

## 2023-10-11 NOTE — TELEPHONE ENCOUNTER
"Over the weekend she had "no control" of her bladder, with dysuria and "pressure of her bladder." She gets the urge when she gets up, but not when she coughs or sneezes. She does get a sudden urge to urinate as well; Gynecologist last year told her that she has a bladder prolapse. She reports that she is feeling better today and that she she is busy, her symptoms do not bother her.     - counseled patient on doing Kegel's everyday to strengthen her pelvic floor muscles   - if that doesn't work, she can ask for referral to PT   - will refer patient to urogyn to work up her incontinence   "

## 2023-10-16 ENCOUNTER — HOSPITAL ENCOUNTER (OUTPATIENT)
Dept: RADIOLOGY | Facility: CLINIC | Age: 78
Discharge: HOME | End: 2023-10-16
Attending: INTERNAL MEDICINE
Payer: MEDICARE

## 2023-10-16 DIAGNOSIS — Z78.0 OSTEOPENIA AFTER MENOPAUSE: ICD-10-CM

## 2023-10-16 DIAGNOSIS — M85.80 OSTEOPENIA AFTER MENOPAUSE: ICD-10-CM

## 2023-10-16 PROCEDURE — 77080 DXA BONE DENSITY AXIAL SKELETON 1 OR MORE SITES: ICD-10-PCS | Mod: 26,HCNC,, | Performed by: INTERNAL MEDICINE

## 2023-10-16 PROCEDURE — 77080 DXA BONE DENSITY AXIAL: CPT | Mod: 26,HCNC,, | Performed by: INTERNAL MEDICINE

## 2023-10-16 PROCEDURE — 77080 DXA BONE DENSITY AXIAL: CPT | Mod: TC,HCNC,PO

## 2023-10-19 DIAGNOSIS — I48.19 PERSISTENT ATRIAL FIBRILLATION: ICD-10-CM

## 2023-10-19 RX ORDER — RIVAROXABAN 20 MG/1
20 TABLET, FILM COATED ORAL NIGHTLY
Qty: 90 TABLET | Refills: 10 | Status: SHIPPED | OUTPATIENT
Start: 2023-10-19

## 2023-10-20 ENCOUNTER — TELEPHONE (OUTPATIENT)
Dept: FAMILY MEDICINE | Facility: CLINIC | Age: 78
End: 2023-10-20
Payer: MEDICARE

## 2023-10-20 NOTE — TELEPHONE ENCOUNTER
----- Message from Samuel Finch sent at 10/19/2023  4:32 PM CDT -----  Regarding: pt called  Type: Patient Call Back         Who called: MARIELOS ARTEAGA [[id number]]         What is the request in detail: Pt called to schedule for results          Can the clinic reply by [facility]? No         Would the patient rather a call back or a response via [facility]? Call back         Best call back number: Telephone Information:  Mobile          [phone]             Additional Information:         Thank you.

## 2023-10-20 NOTE — TELEPHONE ENCOUNTER
----- Message from Melinda Manning sent at 10/20/2023  9:43 AM CDT -----  Regarding: self  .[phone]  .Type:  Patient Returning Call    Who Called: self     Who Left Message for Patient: Jose Eduardo    Does the patient know what this is regarding? Yes     Would the patient rather a call back or a response via [facility]? Call back     Best Call Back Number: .[phone]

## 2023-10-20 NOTE — TELEPHONE ENCOUNTER
Spoke with patient. Patient scheduled to see NP on 11/08/23. Patient states she already has an appointment with an endocrinology. Patient wants to know if atorvastatin contributes to osteoporosis. Patient was notified to discuss at visit with the endocrinology or when she comes in to see NP. Patient request message to be sent to provider regarding if medication contributes to osteoporosis.

## 2023-10-27 ENCOUNTER — PATIENT MESSAGE (OUTPATIENT)
Dept: FAMILY MEDICINE | Facility: CLINIC | Age: 78
End: 2023-10-27
Payer: MEDICARE

## 2023-11-08 ENCOUNTER — OFFICE VISIT (OUTPATIENT)
Dept: FAMILY MEDICINE | Facility: CLINIC | Age: 78
End: 2023-11-08
Payer: MEDICARE

## 2023-11-08 VITALS
BODY MASS INDEX: 29.39 KG/M2 | HEART RATE: 73 BPM | WEIGHT: 176.56 LBS | TEMPERATURE: 98 F | OXYGEN SATURATION: 96 % | DIASTOLIC BLOOD PRESSURE: 80 MMHG | SYSTOLIC BLOOD PRESSURE: 120 MMHG

## 2023-11-08 DIAGNOSIS — Z00.00 ROUTINE MEDICAL EXAM: ICD-10-CM

## 2023-11-08 DIAGNOSIS — M81.0 OSTEOPOROSIS, UNSPECIFIED OSTEOPOROSIS TYPE, UNSPECIFIED PATHOLOGICAL FRACTURE PRESENCE: Primary | ICD-10-CM

## 2023-11-08 DIAGNOSIS — E66.9 OBESITY (BMI 30-39.9): ICD-10-CM

## 2023-11-08 DIAGNOSIS — F41.9 ANXIETY: ICD-10-CM

## 2023-11-08 DIAGNOSIS — I10 ESSENTIAL HYPERTENSION: ICD-10-CM

## 2023-11-08 PROCEDURE — 3074F PR MOST RECENT SYSTOLIC BLOOD PRESSURE < 130 MM HG: ICD-10-PCS | Mod: HCNC,CPTII,S$GLB, | Performed by: NURSE PRACTITIONER

## 2023-11-08 PROCEDURE — 99999 PR PBB SHADOW E&M-EST. PATIENT-LVL IV: ICD-10-PCS | Mod: PBBFAC,HCNC,, | Performed by: NURSE PRACTITIONER

## 2023-11-08 PROCEDURE — 99214 PR OFFICE/OUTPT VISIT, EST, LEVL IV, 30-39 MIN: ICD-10-PCS | Mod: HCNC,S$GLB,, | Performed by: NURSE PRACTITIONER

## 2023-11-08 PROCEDURE — 99214 OFFICE O/P EST MOD 30 MIN: CPT | Mod: HCNC,S$GLB,, | Performed by: NURSE PRACTITIONER

## 2023-11-08 PROCEDURE — 1159F PR MEDICATION LIST DOCUMENTED IN MEDICAL RECORD: ICD-10-PCS | Mod: HCNC,CPTII,S$GLB, | Performed by: NURSE PRACTITIONER

## 2023-11-08 PROCEDURE — 3288F PR FALLS RISK ASSESSMENT DOCUMENTED: ICD-10-PCS | Mod: HCNC,CPTII,S$GLB, | Performed by: NURSE PRACTITIONER

## 2023-11-08 PROCEDURE — 1126F AMNT PAIN NOTED NONE PRSNT: CPT | Mod: HCNC,CPTII,S$GLB, | Performed by: NURSE PRACTITIONER

## 2023-11-08 PROCEDURE — 1101F PR PT FALLS ASSESS DOC 0-1 FALLS W/OUT INJ PAST YR: ICD-10-PCS | Mod: HCNC,CPTII,S$GLB, | Performed by: NURSE PRACTITIONER

## 2023-11-08 PROCEDURE — 99999 PR PBB SHADOW E&M-EST. PATIENT-LVL IV: CPT | Mod: PBBFAC,HCNC,, | Performed by: NURSE PRACTITIONER

## 2023-11-08 PROCEDURE — 3079F DIAST BP 80-89 MM HG: CPT | Mod: HCNC,CPTII,S$GLB, | Performed by: NURSE PRACTITIONER

## 2023-11-08 PROCEDURE — 1101F PT FALLS ASSESS-DOCD LE1/YR: CPT | Mod: HCNC,CPTII,S$GLB, | Performed by: NURSE PRACTITIONER

## 2023-11-08 PROCEDURE — 1159F MED LIST DOCD IN RCRD: CPT | Mod: HCNC,CPTII,S$GLB, | Performed by: NURSE PRACTITIONER

## 2023-11-08 PROCEDURE — 3288F FALL RISK ASSESSMENT DOCD: CPT | Mod: HCNC,CPTII,S$GLB, | Performed by: NURSE PRACTITIONER

## 2023-11-08 PROCEDURE — 3079F PR MOST RECENT DIASTOLIC BLOOD PRESSURE 80-89 MM HG: ICD-10-PCS | Mod: HCNC,CPTII,S$GLB, | Performed by: NURSE PRACTITIONER

## 2023-11-08 PROCEDURE — 3074F SYST BP LT 130 MM HG: CPT | Mod: HCNC,CPTII,S$GLB, | Performed by: NURSE PRACTITIONER

## 2023-11-08 PROCEDURE — 1126F PR PAIN SEVERITY QUANTIFIED, NO PAIN PRESENT: ICD-10-PCS | Mod: HCNC,CPTII,S$GLB, | Performed by: NURSE PRACTITIONER

## 2023-11-08 RX ORDER — FERROUS SULFATE, DRIED 160(50) MG
1 TABLET, EXTENDED RELEASE ORAL 2 TIMES DAILY WITH MEALS
Qty: 60 TABLET | Refills: 3 | Status: SHIPPED | OUTPATIENT
Start: 2023-11-08

## 2023-11-08 RX ORDER — ALENDRONATE SODIUM 70 MG/1
70 TABLET ORAL
Qty: 4 TABLET | Refills: 3 | Status: SHIPPED | OUTPATIENT
Start: 2023-11-08

## 2023-11-08 NOTE — PATIENT INSTRUCTIONS
[facility]--[phone]  Imaging, Xray, CT, MRI, Ultrasound---[phone]  Bariatrics---[phone]  Breast Surgery---[phone]  Case Management---[phone]  Colonoscopy---[phone]  DME---[phone]  Infectious Disease---[phone]  Interventional Radiology---[phone]  Medical Records---[phone]  [facility]---[phone]  Optometry/Ophthalmology---[phone]  [facility]---[phone]  Physical Therapy---[phone]  Psychiatry---[phone] or [phone]  Plastic Surgery---[phone]  Recovery--[phone] option 2, or [phone].  Sleep Study---[phone]  Smoking Cessation---[phone]  Wound Care---[phone]  Referral Desk---[phone]

## 2023-11-08 NOTE — PROGRESS NOTES
HPI     Chief Complaint:  Chief Complaint   Patient presents with    [facility]       Hidalgo THIERRY Fernández is a 78 y.o. female with multiple medical diagnoses as listed in the medical history and problem list that presents for review DEXA scan results.  Pt is new to me but is known to this clinic with her last appointment being 7/7/2023.        DXA Bone Density Axial Skeleton 1 or more sites  Status: Final result     MyChart Results Release    Global [employer] Status: Active  Results Release     PACS Images for Jamn Viewer     Show images for DXA Bone Density Axial Skeleton 1 or more sites  All Reviewers List    Vickie Hurtado MD on 10/19/2023 16:13     DXA Bone Density Axial Skeleton 1 or more sites  Order: [id number]  Status: Final result     Visible to patient: Yes (seen)     Next appt: Today at 02:30 PM in Family Medicine (Carlos Baig NP)     Dx: Osteopenia after menopause     0 Result Notes    1 Patient Communication    1  Topic  Details    Reading Physician Reading Date Result Priority   Raymond Curiel MD  [phone] 10/19/2023      Narrative & Impression  EXAMINATION:  DXA BONE DENSITY AXIAL SKELETON 1 OR MORE SITES     CLINICAL HISTORY:  Other specified disorders of bone density and structure, unspecified site.  No reported history of fractures. No reported treatments for osteoporosis.     TECHNIQUE:  DXA specification: [facility] ([id number])     Bone Mineral Density scanning was performed over the hip and lumbar spine.     Review of the images confirms satisfactory positioning and technique.     COMPARISON:  Comparison study dated 06/24/2019     FINDINGS:  Lumbar spine (L1-L4):               T-score is -0.7, and Z-score is +1.9.     Compared with previous DXA, BMD at the lumbar spine has declined by 7.9%.     Femoral neck:                          T-score is -2.2, and Z-score is 0.0.     Total hip:                                T-score is -1.2, and Z-score is  +0.7.     Compared with previous DXA, BMD at the total hip has declined by 8.4%.        Fracture Risk (FRAX)     15% risk of a major osteoporotic fracture in the next 10 years.     4.3% risk of hip fracture in the next 10 years.     Impression:     *Osteoporosis based on T-score between -1.0 and -2.5 and elevated risk based on FRAX  *Fracture risk is high.     RECOMMENDATIONS:  *Daily calcium intake 0707-6538 mg, dietary sources preferred; Vitamin D 0065-6316 IU daily.  *Weight bearing exercise and fall precautions.  *Recommend medical therapy for osteoporosis with high risk for fracture. Consider bisphosphonates (alendronate, risedronate, zoledronic acid), or denosumab.  *Repeat BMD in 2 years.        Electronically signed by: Raymond Curiel  Date:                                            10/19/2023  Time:                                           11:17           Exam Ended: 10/16/23 10:31 Last Resulted: 10/19/23 11:17    Axel as an Unsuccessful Attempt       Order Details     View Encounter     Lab and Collection Details     Routing     Result History     View All Conversations on this Encounter           Result Care Coordination      Patient Communication     Append Comments   Seen Back to Top    Your Bone Mineral Density test showed osteoporosis with high fracture risk. It is recommended that we start prescription medication. If you would like to pursue prescription treatment please schedule an office visit with myself or my nurse practitioner to discuss her treatment options.  May also consider seeing an endocrinologist for further discussion of medication options.               Assessment & Plan     Problem List Items Addressed This Visit          Psychiatric    Anxiety    Encouraged the patient to perform self-calming techniques, such as deep breathing/relaxation techniques and exercise.         Cardiac/Vascular    Essential hypertension    BP Readings from Last 3 Encounters:   11/08/23 120/80   09/25/23  132/84   23 128/78       -continue current medication regimen  -DASH diet, regular cardiovascular exercises, portion control  - ?weight loss  -f/u with BP logs in 2 weeks          Endocrine    Obesity (BMI 30-39.9)    We discussed weight issues and safe, effective ways of losing pounds, includin) diet:  low carbohydrate, low fat diet, stay away from fast food, fried and processed food, use whole grain, lot of fruits and vegetables, use healthy fat such as avocado, nuts and olive oil in reasonable quantity, stay away from sodas. Regular meals with lean proteins.  2) physical activity: ideally 150 min a week, with cardiovascular and resistance activity.  Patient was encouraged to set realistic attainable goals for weight loss, and we will follow up periodically.    Discussed Mediterranean Diet recommendations (Adopted from Todd et al, [facility], 2018.)  - Eat primarily plant-based foods, such as fruits and vegetables, whole grains, legumes (beans) and nuts  - Limit refined carbohydrates (white pasta, bread, rice).  - Replace butter with healthy fats such as olive oil.  - Use herbs and spices instead of salt to flavor foods.  - Limit red meat and processed meats to no more than a few times a month.  - Avoid sugary sodas, bakery goods, and sweets.  - Eat fish and poultry at least twice a week.       Other Visit Diagnoses       Osteoporosis, unspecified osteoporosis type, unspecified pathological fracture presence    -  Primary    Reviewed bone density results  Advised weight bearing exercises.   Fall reductions strategies  Continue OTC calcium and vitamin D supplement.   Start Fosamax    Take on an empty stomach before breakfast.  Take at least 30 minutes before the first food, drink, or drugs of the day.  Do not lie down for at least 30 minutes after taking this drug and until you eat your first food of the day.  Keep taking this drug as you have been told by your doctor or other health care provider, even if  you feel well.  Take with a full glass of water.  Take with plain water only. Avoid taking with mineral water, milk, or other drinks.  Swallow whole. Do not chew, break, or crush.  Do not suck on this product.    Discussed condition, and treatment.   Education sent to patient portal/included in after visit summary.  ED precautions given.   Notify provider if symptoms do not resolve or increase in severity.   Patient verbalizes understanding and agrees with plan of care.      Relevant Medications    alendronate (FOSAMAX) 70 MG tablet    calcium-vitamin D3 (CALCIUM 500 + D) 500 mg-5 mcg (200 unit) per tablet                  --------------------------------------------      Health Maintenance:  Health Maintenance         Date Due Completion Date    RSV Vaccine (Age 60+) (1 - 1-dose 60+ series) Never done ---    COVID-19 Vaccine (7 - 2023-24 season) 12/09/2023 10/14/2023    Lipid Panel 06/30/2024 6/30/2023    Hemoglobin A1c 06/30/2024 6/30/2023    DEXA Scan 10/16/2025 10/16/2023    Override on 7/7/2011: Done    TETANUS VACCINE 06/06/2026 6/6/2016    Colonoscopy 08/21/2030 8/21/2020    Override on 3/10/2015: Declined            Advised patient on the importance of completing overdue health maintenance items    Follow Up:  Follow up in about 3 months (around 2/8/2024).    Exam     Review of Systems:  (as noted above)  Review of Systems   Constitutional:  Negative for fever.   HENT:  Negative for trouble swallowing.    Eyes:  Negative for visual disturbance.   Respiratory:  Negative for chest tightness and shortness of breath.    Cardiovascular:  Negative for chest pain.   Gastrointestinal:  Negative for blood in stool.       Physical Exam:   Physical Exam  Constitutional:       General: She is not in acute distress.     Appearance: She is not ill-appearing or diaphoretic.   HENT:      Head: Normocephalic and atraumatic.   Cardiovascular:      Rate and Rhythm: Normal rate and regular rhythm.      Heart sounds: No murmur  heard.     No friction rub. No gallop.   Pulmonary:      Effort: No respiratory distress.   Chest:      Chest wall: No tenderness.   Musculoskeletal:      Cervical back: No rigidity.   Skin:     Capillary Refill: Capillary refill takes 2 to 3 seconds.   Neurological:      General: No focal deficit present.      Mental Status: She is alert and oriented to person, place, and time.       Vitals:    23 1429   BP: 120/80   BP Location: Left arm   Patient Position: Sitting   BP Method: Large (Manual)   Pulse: 73   Temp: 98.1 °F (36.7 °C)   TempSrc: Oral   SpO2: 96%   Weight: 80.1 kg (176 lb 9.4 oz)      Body mass index is 29.39 kg/m².        History     Past Medical History:  Past Medical History:   Diagnosis Date    Atrial fibrillation     Followed by EP cardiology    Borderline hyperlipidemia     Cataract     History of abnormal Pap smear     More than 20 years ago; status post colposcopy    History of anxiety disorder     History of hypertension     Currently doing okay off medication    Hypertension     Osteopenia     Refuses medication    Overweight(278.02)     Stroke 2019       Past Surgical History:  Past Surgical History:   Procedure Laterality Date     SECTION, LOW TRANSVERSE      X1    COLONOSCOPY N/A 2020    Procedure: COLONOSCOPY;  Surgeon: Maicol Johnson MD;  Location: [address]);  Service: Endoscopy;  Laterality: N/A;  Covid test on . Pt. holding Xarelto for 2 days prior to.EC    left knee meniscus surgery  2012    neck tuck and liposuction  2012    PSVT ablation      RADIOFREQUENCY ABLATION  2004    Skin cancer removal on the left eye         Social History:  Social History     Socioeconomic History    Marital status:     Number of children: 1   Occupational History    Occupation: Teacher   Tobacco Use    Smoking status: Former     Types: Cigarettes    Smokeless tobacco: Never   Substance and Sexual Activity    Alcohol use: Yes     Comment: Rarely    Drug use: No      Social Determinants of Health     Financial Resource Strain: Low Risk  (7/9/2020)    Overall Financial Resource Strain (CARDIA)     Difficulty of Paying Living Expenses: Not hard at all   Food Insecurity: No Food Insecurity (7/9/2020)    Hunger Vital Sign     Worried About Running Out of Food in the Last Year: Never true     Ran Out of Food in the Last Year: Never true   Transportation Needs: No Transportation Needs (7/9/2020)    PRAPARE - Transportation     Lack of Transportation (Medical): No     Lack of Transportation (Non-Medical): No   Physical Activity: Insufficiently Active (7/9/2020)    Exercise Vital Sign     Days of Exercise per Week: 2 days     Minutes of Exercise per Session: 30 min   Stress: Stress Concern Present (7/9/2020)    [employer] [place] [employer] - Occupational Stress Questionnaire     Feeling of Stress : Very much   Social Connections: Unknown (7/9/2020)    Social Connection and Isolation Panel [NHANES]     Frequency of Communication with Friends and Family: More than three times a week     Frequency of Social Gatherings with Friends and Family: Never     Active Member of Clubs or Organizations: Yes     Attends Club or Organization Meetings: More than 4 times per year     Marital Status:        Family History:  Family History   Problem Relation Age of Onset    Heart disease Mother     Cancer Father         nasopharynx    Breast cancer Cousin     Cancer Cousin         colon cancer    Hypertension Sister     Diabetes Sister     Cancer Sister     Ovarian cancer Sister     Coronary artery disease Brother         s/p stenting    Hypertension Brother     Rheum arthritis Sister     Hypertension Brother     Hypertension Brother     Heart disease Brother         s/p CABG    Hypertension Brother     Coronary artery disease Brother         s/p stenting    Cancer Brother     No Known Problems Maternal Grandmother     No Known Problems Maternal Grandfather     No Known Problems  Paternal Grandmother     No Known Problems Paternal Grandfather     No Known Problems Maternal Aunt     No Known Problems Maternal Uncle     No Known Problems Paternal Aunt     No Known Problems Paternal Uncle     Colon cancer Neg Hx     Amblyopia Neg Hx     Blindness Neg Hx     Cataracts Neg Hx     Glaucoma Neg Hx     Macular degeneration Neg Hx     Retinal detachment Neg Hx     Strabismus Neg Hx     Stroke Neg Hx     Thyroid disease Neg Hx        Allergies and Medications: (updated and reviewed)  Review of patient's allergies indicates:   Allergen Reactions    Flecainide Other (See Comments)     Other reaction(s): worsening arrythmia    Multaq [dronedarone]      rash     Current Outpatient Medications   Medication Sig Dispense Refill    ALPRAZolam (XANAX) 0.25 MG tablet TAKE 1 TABLET BY MOUTH AT BEDTIME AS NEEDED FOR ANXIETRY 30 tablet 0    amLODIPine (NORVASC) 5 MG tablet TAKE 1 TABLET EVERY DAY 90 tablet 1    atorvastatin (LIPITOR) 20 MG tablet TAKE 1 TABLET EVERY DAY 90 tablet 1    fluticasone propionate (FLONASE) 50 mcg/actuation nasal spray 1 spray (50 mcg total) by Each Nostril route daily as needed for Rhinitis. 15.8 mL 1    furosemide (LASIX) 20 MG tablet Take 1 tablet (20 mg total) by mouth 2 (two) times daily as needed (Leg Swelling). 90 tablet 2    XARELTO 20 mg Tab TAKE 1 TABLET EVERY EVENING 90 tablet 10    alendronate (FOSAMAX) 70 MG tablet Take 1 tablet (70 mg total) by mouth every 7 days. 4 tablet 3    calcium-vitamin D3 (CALCIUM 500 + D) 500 mg-5 mcg (200 unit) per tablet Take 1 tablet by mouth 2 (two) times daily with meals. 60 tablet 3    gabapentin (NEURONTIN) 100 MG capsule Take 1 capsule (100 mg total) by mouth 3 (three) times daily. 90 capsule 11     No current facility-administered medications for this visit.       Patient Care Team:  Vickie Hurtado MD as PCP - General (Internal Medicine)  Helga Mcmullen LPN as Licensed Practical Nurse  Jose Eduardo Stanley MD as Consulting Physician  (Electrophysiology)  Radha Banuelos as Digital Medicine Health   India Dumont PharmD as Hyperlipidemia Digital Medicine Clinician  Vickie Hurtado MD as Hyperlipidemia Digital Medicine Responsible Provider (Internal Medicine)  India Dumont PharmD as Hypertension Digital Medicine Clinician (Pharmacist)  Vickie Hurtado MD as Hypertension Digital Medicine Responsible Provider (Internal Medicine)  [facility] as Hypertension Digital Medicine Contract         - The patient is given an After Visit Summary that lists all medications with directions, allergies, education, orders placed during this encounter and follow-up instructions.      - I have reviewed the patient's medical information including past medical, family, and social history sections including the medications and allergies.      - We discussed the patient's current medications.     This note was created by combination of typed  and MModal dictation.  Transcription errors may be present.  If there are any questions, please contact me.       Carlos Baig NP

## 2023-11-10 ENCOUNTER — OFFICE VISIT (OUTPATIENT)
Dept: OPTOMETRY | Facility: CLINIC | Age: 78
End: 2023-11-10
Payer: MEDICARE

## 2023-11-10 DIAGNOSIS — H25.013 CORTICAL AGE-RELATED CATARACT OF BOTH EYES: ICD-10-CM

## 2023-11-10 DIAGNOSIS — H52.7 REFRACTIVE ERROR: ICD-10-CM

## 2023-11-10 DIAGNOSIS — Z97.3 WEARS CONTACT LENSES: ICD-10-CM

## 2023-11-10 DIAGNOSIS — Z46.0 ENCOUNTER FOR FITTING OR ADJUSTMENT OF SPECTACLES OR CONTACT LENSES: Primary | ICD-10-CM

## 2023-11-10 DIAGNOSIS — H25.13 NUCLEAR SCLEROSIS OF BOTH EYES: Primary | ICD-10-CM

## 2023-11-10 PROCEDURE — 99999 PR PBB SHADOW E&M-EST. PATIENT-LVL II: ICD-10-PCS | Mod: PBBFAC,HCNC,, | Performed by: OPTOMETRIST

## 2023-11-10 PROCEDURE — 3288F FALL RISK ASSESSMENT DOCD: CPT | Mod: HCNC,CPTII,S$GLB, | Performed by: OPTOMETRIST

## 2023-11-10 PROCEDURE — 92310 PR CONTACT LENS FITTING (NO CHANGE): ICD-10-PCS | Mod: CSM,HCNC,S$GLB, | Performed by: OPTOMETRIST

## 2023-11-10 PROCEDURE — 99999 PR PBB SHADOW E&M-EST. PATIENT-LVL II: CPT | Mod: PBBFAC,HCNC,, | Performed by: OPTOMETRIST

## 2023-11-10 PROCEDURE — 1101F PR PT FALLS ASSESS DOC 0-1 FALLS W/OUT INJ PAST YR: ICD-10-PCS | Mod: HCNC,CPTII,S$GLB, | Performed by: OPTOMETRIST

## 2023-11-10 PROCEDURE — 1126F PR PAIN SEVERITY QUANTIFIED, NO PAIN PRESENT: ICD-10-PCS | Mod: HCNC,CPTII,S$GLB, | Performed by: OPTOMETRIST

## 2023-11-10 PROCEDURE — 92015 DETERMINE REFRACTIVE STATE: CPT | Mod: HCNC,S$GLB,, | Performed by: OPTOMETRIST

## 2023-11-10 PROCEDURE — 99499 NO LOS: ICD-10-PCS | Mod: HCNC,,, | Performed by: OPTOMETRIST

## 2023-11-10 PROCEDURE — 92310 CONTACT LENS FITTING OU: CPT | Mod: CSM,HCNC,S$GLB, | Performed by: OPTOMETRIST

## 2023-11-10 PROCEDURE — 92015 PR REFRACTION: ICD-10-PCS | Mod: HCNC,S$GLB,, | Performed by: OPTOMETRIST

## 2023-11-10 PROCEDURE — 99214 OFFICE O/P EST MOD 30 MIN: CPT | Mod: HCNC,S$GLB,, | Performed by: OPTOMETRIST

## 2023-11-10 PROCEDURE — 99499 UNLISTED E&M SERVICE: CPT | Mod: HCNC,,, | Performed by: OPTOMETRIST

## 2023-11-10 PROCEDURE — 99214 PR OFFICE/OUTPT VISIT, EST, LEVL IV, 30-39 MIN: ICD-10-PCS | Mod: HCNC,S$GLB,, | Performed by: OPTOMETRIST

## 2023-11-10 PROCEDURE — 3288F PR FALLS RISK ASSESSMENT DOCUMENTED: ICD-10-PCS | Mod: HCNC,CPTII,S$GLB, | Performed by: OPTOMETRIST

## 2023-11-10 PROCEDURE — 1126F AMNT PAIN NOTED NONE PRSNT: CPT | Mod: HCNC,CPTII,S$GLB, | Performed by: OPTOMETRIST

## 2023-11-10 PROCEDURE — 1101F PT FALLS ASSESS-DOCD LE1/YR: CPT | Mod: HCNC,CPTII,S$GLB, | Performed by: OPTOMETRIST

## 2023-11-10 NOTE — PROGRESS NOTES
Subjective:       Patient ID: Gale Fernández is a 78 y.o. female      Chief Complaint   Patient presents with    Concerns About Ocular Health     History of Present Illness  HPI    Dls: 10/17/22 Dr. Meza    77 y/o female presents today for hypertensive eye exam.   Pt states no changes in vision. Pt has pal's but does not like them pt wants new rx for single vision.  Pt wears scls pt has 9.50 OU today     + od  tearing  No itching  No burning  No pain  No ha's  + ou off/on floaters  No flashes    Eye meds  None    Pohx:   None    Fohx:  None    Wearing air optix N&D, replacing every 3 weeks. does sleep in lenses        Assessment/Plan:     1. Nuclear sclerosis of both eyes  2. Cortical age-related cataract of both eyes  Visually significant cataract. Discussed diagnosis with patient and surgical process. Referral for cataract evaluation.   - Ambulatory referral/consult to Ophthalmology    3. Refractive error  Pt advised MRx will change after surgery. Can hold on updating MRx.    4. Wears contact lenses  Pt advised may need to be out of contacts before cataract consult. Daily wear, monthly replacement.     Contact Lens Final Rx       Final Contact Lens Rx         Brand Base Curve Diameter Sphere Cylinder    Right Air Optix Night and Day 8.6 13.8 -9.50 Sphere    Left Air Optix Night and Day 8.6 13.8 -9.50 Sphere      Expiration Date: 11/10/2024    Replacement: Monthly    Solutions: OptiFree PureMoist    Wearing Schedule: Extended Wear                      Follow up for cataract consult.

## 2023-11-15 DIAGNOSIS — I10 ESSENTIAL HYPERTENSION: ICD-10-CM

## 2023-11-15 RX ORDER — AMLODIPINE BESYLATE 5 MG/1
TABLET ORAL
Qty: 90 TABLET | Refills: 2 | Status: SHIPPED | OUTPATIENT
Start: 2023-11-15

## 2023-11-15 NOTE — TELEPHONE ENCOUNTER
Refill Decision Note   [place] [facility]  is requesting a refill authorization.  Brief Assessment and Rationale for Refill:  Approve     Medication Therapy Plan:         Comments:     Note composed:1:31 PM 11/15/2023             Appointments     Last Visit   7/7/2023 Vickie Hurtado MD   Next Visit   Visit date not found Vickie Hurtado MD

## 2023-11-15 NOTE — TELEPHONE ENCOUNTER
No care due was identified.  [facility] Embedded Care Due Messages. Reference number: [id number].   11/15/2023 10:24:31 AM CST

## 2023-11-17 ENCOUNTER — PATIENT MESSAGE (OUTPATIENT)
Dept: OPTOMETRY | Facility: CLINIC | Age: 78
End: 2023-11-17
Payer: MEDICARE

## 2023-12-20 ENCOUNTER — LAB VISIT (OUTPATIENT)
Dept: LAB | Facility: HOSPITAL | Age: 78
End: 2023-12-20
Attending: INTERNAL MEDICINE
Payer: MEDICARE

## 2023-12-20 ENCOUNTER — OFFICE VISIT (OUTPATIENT)
Dept: FAMILY MEDICINE | Facility: CLINIC | Age: 78
End: 2023-12-20
Payer: MEDICARE

## 2023-12-20 VITALS
BODY MASS INDEX: 29.09 KG/M2 | SYSTOLIC BLOOD PRESSURE: 132 MMHG | HEIGHT: 65 IN | DIASTOLIC BLOOD PRESSURE: 72 MMHG | HEART RATE: 96 BPM | TEMPERATURE: 98 F | OXYGEN SATURATION: 98 % | RESPIRATION RATE: 16 BRPM | WEIGHT: 174.63 LBS

## 2023-12-20 DIAGNOSIS — N30.00 ACUTE CYSTITIS WITHOUT HEMATURIA: ICD-10-CM

## 2023-12-20 DIAGNOSIS — N30.00 ACUTE CYSTITIS WITHOUT HEMATURIA: Primary | ICD-10-CM

## 2023-12-20 LAB
BACTERIA #/AREA URNS AUTO: ABNORMAL /HPF
BILIRUB UR QL STRIP: NEGATIVE
CLARITY UR REFRACT.AUTO: ABNORMAL
COLOR UR AUTO: COLORLESS
GLUCOSE UR QL STRIP: NEGATIVE
HGB UR QL STRIP: ABNORMAL
HYALINE CASTS UR QL AUTO: 0 /LPF
KETONES UR QL STRIP: NEGATIVE
LEUKOCYTE ESTERASE UR QL STRIP: ABNORMAL
MICROSCOPIC COMMENT: ABNORMAL
NITRITE UR QL STRIP: NEGATIVE
PH UR STRIP: 6 [PH] (ref 5–8)
PROT UR QL STRIP: ABNORMAL
RBC #/AREA URNS AUTO: 27 /HPF (ref 0–4)
SP GR UR STRIP: 1.01 (ref 1–1.03)
URN SPEC COLLECT METH UR: ABNORMAL
WBC #/AREA URNS AUTO: >100 /HPF (ref 0–5)
WBC CLUMPS UR QL AUTO: ABNORMAL

## 2023-12-20 PROCEDURE — 87088 URINE BACTERIA CULTURE: CPT | Mod: HCNC | Performed by: INTERNAL MEDICINE

## 2023-12-20 PROCEDURE — 87077 CULTURE AEROBIC IDENTIFY: CPT | Mod: HCNC | Performed by: INTERNAL MEDICINE

## 2023-12-20 PROCEDURE — 87086 URINE CULTURE/COLONY COUNT: CPT | Mod: HCNC | Performed by: INTERNAL MEDICINE

## 2023-12-20 PROCEDURE — 87186 SC STD MICRODIL/AGAR DIL: CPT | Mod: HCNC | Performed by: INTERNAL MEDICINE

## 2023-12-20 PROCEDURE — 3288F FALL RISK ASSESSMENT DOCD: CPT | Mod: HCNC,CPTII,S$GLB, | Performed by: INTERNAL MEDICINE

## 2023-12-20 PROCEDURE — 1126F PR PAIN SEVERITY QUANTIFIED, NO PAIN PRESENT: ICD-10-PCS | Mod: HCNC,CPTII,S$GLB, | Performed by: INTERNAL MEDICINE

## 2023-12-20 PROCEDURE — 1101F PT FALLS ASSESS-DOCD LE1/YR: CPT | Mod: HCNC,CPTII,S$GLB, | Performed by: INTERNAL MEDICINE

## 2023-12-20 PROCEDURE — 3078F DIAST BP <80 MM HG: CPT | Mod: HCNC,CPTII,S$GLB, | Performed by: INTERNAL MEDICINE

## 2023-12-20 PROCEDURE — 1126F AMNT PAIN NOTED NONE PRSNT: CPT | Mod: HCNC,CPTII,S$GLB, | Performed by: INTERNAL MEDICINE

## 2023-12-20 PROCEDURE — 3078F PR MOST RECENT DIASTOLIC BLOOD PRESSURE < 80 MM HG: ICD-10-PCS | Mod: HCNC,CPTII,S$GLB, | Performed by: INTERNAL MEDICINE

## 2023-12-20 PROCEDURE — 81001 URINALYSIS AUTO W/SCOPE: CPT | Mod: HCNC | Performed by: INTERNAL MEDICINE

## 2023-12-20 PROCEDURE — 1101F PR PT FALLS ASSESS DOC 0-1 FALLS W/OUT INJ PAST YR: ICD-10-PCS | Mod: HCNC,CPTII,S$GLB, | Performed by: INTERNAL MEDICINE

## 2023-12-20 PROCEDURE — 99214 OFFICE O/P EST MOD 30 MIN: CPT | Mod: HCNC,S$GLB,, | Performed by: INTERNAL MEDICINE

## 2023-12-20 PROCEDURE — 1160F PR REVIEW ALL MEDS BY PRESCRIBER/CLIN PHARMACIST DOCUMENTED: ICD-10-PCS | Mod: HCNC,CPTII,S$GLB, | Performed by: INTERNAL MEDICINE

## 2023-12-20 PROCEDURE — 1159F PR MEDICATION LIST DOCUMENTED IN MEDICAL RECORD: ICD-10-PCS | Mod: HCNC,CPTII,S$GLB, | Performed by: INTERNAL MEDICINE

## 2023-12-20 PROCEDURE — 1159F MED LIST DOCD IN RCRD: CPT | Mod: HCNC,CPTII,S$GLB, | Performed by: INTERNAL MEDICINE

## 2023-12-20 PROCEDURE — 99999 PR PBB SHADOW E&M-EST. PATIENT-LVL III: CPT | Mod: PBBFAC,HCNC,, | Performed by: INTERNAL MEDICINE

## 2023-12-20 PROCEDURE — 99999 PR PBB SHADOW E&M-EST. PATIENT-LVL III: ICD-10-PCS | Mod: PBBFAC,HCNC,, | Performed by: INTERNAL MEDICINE

## 2023-12-20 PROCEDURE — 3075F PR MOST RECENT SYSTOLIC BLOOD PRESS GE 130-139MM HG: ICD-10-PCS | Mod: HCNC,CPTII,S$GLB, | Performed by: INTERNAL MEDICINE

## 2023-12-20 PROCEDURE — 99214 PR OFFICE/OUTPT VISIT, EST, LEVL IV, 30-39 MIN: ICD-10-PCS | Mod: HCNC,S$GLB,, | Performed by: INTERNAL MEDICINE

## 2023-12-20 PROCEDURE — 3288F PR FALLS RISK ASSESSMENT DOCUMENTED: ICD-10-PCS | Mod: HCNC,CPTII,S$GLB, | Performed by: INTERNAL MEDICINE

## 2023-12-20 PROCEDURE — 3075F SYST BP GE 130 - 139MM HG: CPT | Mod: HCNC,CPTII,S$GLB, | Performed by: INTERNAL MEDICINE

## 2023-12-20 PROCEDURE — 1160F RVW MEDS BY RX/DR IN RCRD: CPT | Mod: HCNC,CPTII,S$GLB, | Performed by: INTERNAL MEDICINE

## 2023-12-20 RX ORDER — SULFAMETHOXAZOLE AND TRIMETHOPRIM 800; 160 MG/1; MG/1
1 TABLET ORAL 2 TIMES DAILY
Qty: 10 TABLET | Refills: 0 | Status: SHIPPED | OUTPATIENT
Start: 2023-12-20 | End: 2023-12-25

## 2023-12-20 NOTE — PROGRESS NOTES
"Subjective:       Patient ID: Gale Fernández is a pleasant 78 y.o. White female patient    Chief Complaint: Urinary Tract Infection (Burning when urinating since yesterday)      Patient is a pt I saw once in 2020 but is pt from Dr. Hurtado.    HPI     Pt with PMH as per list of problems below coming today due to new issue of dysuria.  It started yesterday. She also has frequency. No flank pain, no fever. No blood in the urine.  Had UTI in 09/2023, and was seen by Dr. Hall, was given Bactrim that helped.  See antibiogram.    Patient Active Problem List   Diagnosis    Essential hypertension    Osteopenia after menopause    Persistent atrial fibrillation    Borderline hyperlipidemia    Current use of long term anticoagulation    Prediabetes    History of stroke    History of TIA (transient ischemic attack)    Obesity (BMI 30-39.9)    Anxiety    Burning mouth syndrome    Wears contact lenses    Refractive error    Nuclear sclerosis of both eyes    Dysuria    Sinus congestion          ACTIVE MEDICAL ISSUES:  Documented in Problem List     PAST MEDICAL HISTORY  Documented     PAST SURGICAL HISTORY:  Documented     SOCIAL HISTORY:  Documented     FAMILY HISTORY:  Documented     ALLERGIES AND MEDICATIONS: updated and reviewed.  Documented    Review of Systems    Objective:      Physical Exam    Vitals:    12/20/23 1042   BP: 132/72   BP Location: Left arm   Patient Position: Sitting   BP Method: Large (Manual)   Pulse: 96   Resp: 16   Temp: 98 °F (36.7 °C)   TempSrc: Oral   SpO2: 98%   Weight: 79.2 kg (174 lb 9.7 oz)   Height: 5' 5" (1.651 m)     Body mass index is 29.06 kg/m².    RESULTS: Reviewed labs from last 12 months    Last Lab Results:     Lab Results   Component Value Date    WBC 6.25 06/30/2023    HGB 14.4 06/30/2023    HCT 45.4 06/30/2023     06/30/2023     06/30/2023    K 4.8 06/30/2023     06/30/2023    CO2 27 06/30/2023    BUN 22 06/30/2023    CREATININE 0.8 06/30/2023    CALCIUM 9.1 " 06/30/2023    ALBUMIN 3.6 06/30/2023    AST 18 06/30/2023    ALT 10 06/30/2023    CHOL 112 (L) 06/30/2023    TRIG 53 06/30/2023    HDL 43 06/30/2023    LDLCALC 58.4 (L) 06/30/2023    HGBA1C 5.5 06/30/2023    TSH 1.886 06/30/2023         Component 9/28/2023:   Urine Culture, Routine  Abnormal   ENTEROCOCCUS FAECALIS  10,000 - 49,999 cfu/ml  No other significant isolate    Resulting Agency [facility]        Susceptibility     Enterococcus faecalis     CULTURE, URINE     Ampicillin <=2 mcg/mL Sensitive     Nitrofurantoin <=32 mcg/mL Sensitive     Vancomycin 2 mcg/mL Sensitive               Linear View              Assessment:       1. Acute cystitis without hematuria        Plan:   Gale was seen today for urinary tract infection.    Diagnoses and all orders for this visit:    Acute cystitis without hematuria  -     Urinalysis; Future  -     Urine culture; Future  -     sulfamethoxazole-trimethoprim 800-160mg (BACTRIM DS) 800-160 mg Tab; Take 1 tablet by mouth 2 (two) times daily. for 5 days    See HPI. PE fine. Will treat with Bactrim after UA and culture as worked previously. Caution due to previous antibiogram. Advised to drink plenty of fluid, can drink cranberry juice and take AZO if she wants to, discussed symptoms and signs to monitor.    No follow-ups on file.    This note was created by combination of typed  and M-Modal dictation.  Transcription errors may be present.  If there are any questions, please contact me.

## 2023-12-22 LAB — BACTERIA UR CULT: ABNORMAL

## 2023-12-27 ENCOUNTER — PATIENT MESSAGE (OUTPATIENT)
Dept: FAMILY MEDICINE | Facility: CLINIC | Age: 78
End: 2023-12-27
Payer: MEDICARE

## 2023-12-27 ENCOUNTER — TELEPHONE (OUTPATIENT)
Dept: FAMILY MEDICINE | Facility: CLINIC | Age: 78
End: 2023-12-27
Payer: MEDICARE

## 2023-12-27 DIAGNOSIS — N30.00 ACUTE CYSTITIS WITHOUT HEMATURIA: Primary | ICD-10-CM

## 2023-12-27 RX ORDER — NITROFURANTOIN 25; 75 MG/1; MG/1
100 CAPSULE ORAL 2 TIMES DAILY
Qty: 10 CAPSULE | Refills: 0 | Status: SHIPPED | OUTPATIENT
Start: 2023-12-27 | End: 2024-01-01

## 2023-12-27 NOTE — TELEPHONE ENCOUNTER
From: Paul Brand  To: Michael Milton MD  Sent: 2/1/2018 6:11 PM CST  Subject: colonoscopy    Hello Dr. Milton-  Thanks for the blood results. I will  some B-12.    Not sure about my need for a colonoscopy. I received a letter dated 11/21/2017 from Dr. Wu indicating that it is time. You were not so sure. So should I schedule it now or wait (until when?)  Regards,  Paul   UTI R to Bactrim. Will sent nitrofurantoin

## 2023-12-27 NOTE — TELEPHONE ENCOUNTER
----- Message from Betty Singh MD sent at 12/27/2023  7:42 AM CST -----  Please tell pt that she has an UTI R to Bactrim. I will send a request for nitrofurantoin. Also sent a message to the pt.  Thanks

## 2023-12-27 NOTE — PROGRESS NOTES
Please tell pt that she has an UTI R to Bactrim. I will send a request for nitrofurantoin. Also sent a message to the pt.  Thanks

## 2024-01-02 DIAGNOSIS — I48.19 PERSISTENT ATRIAL FIBRILLATION: Primary | ICD-10-CM

## 2024-01-09 DIAGNOSIS — E78.5 BORDERLINE HYPERLIPIDEMIA: ICD-10-CM

## 2024-01-09 NOTE — TELEPHONE ENCOUNTER
No care due was identified.  [facility] Embedded Care Due Messages. Reference number: [id number].   1/09/2024 11:45:19 AM CST

## 2024-01-10 RX ORDER — ATORVASTATIN CALCIUM 20 MG/1
TABLET, FILM COATED ORAL
Qty: 90 TABLET | Refills: 1 | Status: SHIPPED | OUTPATIENT
Start: 2024-01-10

## 2024-01-10 NOTE — TELEPHONE ENCOUNTER
Refill Decision Note   [place] [facility]  is requesting a refill authorization.  Brief Assessment and Rationale for Refill:  Approve     Medication Therapy Plan:         Comments:     Note composed:3:53 AM 01/10/2024

## 2024-01-23 ENCOUNTER — TELEPHONE (OUTPATIENT)
Dept: OPTOMETRY | Facility: CLINIC | Age: 79
End: 2024-01-23
Payer: MEDICARE

## 2024-01-23 NOTE — TELEPHONE ENCOUNTER
----- Message from Davey George sent at 1/23/2024  9:10 AM CST -----  Contact: [phone]  Pt is calling to see if she needs to refrain from wearing her contacts before her Cat Eval. Please call back to further assist.

## 2024-01-23 NOTE — TELEPHONE ENCOUNTER
Pt requesting to go back to -9.00 for OS lens.    Contact Lens Final Rx       Final Contact Lens Rx         Brand Base Curve Diameter Sphere Cylinder    Right Air Optix Night and Day 8.6 13.8 -9.50 Sphere    Left Air Optix Night and Day 8.6 13.8 -9.00 Sphere      Expiration Date: 11/10/2024    Replacement: Monthly    Solutions: OptiFree PureMoist    Wearing Schedule: Daily Wear

## 2024-01-24 ENCOUNTER — PATIENT MESSAGE (OUTPATIENT)
Dept: OPTOMETRY | Facility: CLINIC | Age: 79
End: 2024-01-24
Payer: MEDICARE

## 2024-01-26 ENCOUNTER — OFFICE VISIT (OUTPATIENT)
Dept: OPHTHALMOLOGY | Facility: CLINIC | Age: 79
End: 2024-01-26
Payer: MEDICARE

## 2024-01-26 DIAGNOSIS — H25.13 NUCLEAR SCLEROSIS OF BOTH EYES: Primary | ICD-10-CM

## 2024-01-26 DIAGNOSIS — H43.813 VITREOUS DETACHMENT OF BOTH EYES: ICD-10-CM

## 2024-01-26 DIAGNOSIS — H26.9 CORTICAL CATARACT OF BOTH EYES: ICD-10-CM

## 2024-01-26 DIAGNOSIS — H52.7 REFRACTIVE ERROR: ICD-10-CM

## 2024-01-26 PROCEDURE — 1126F AMNT PAIN NOTED NONE PRSNT: CPT | Mod: HCNC,CPTII,S$GLB, | Performed by: OPHTHALMOLOGY

## 2024-01-26 PROCEDURE — 1160F RVW MEDS BY RX/DR IN RCRD: CPT | Mod: HCNC,CPTII,S$GLB, | Performed by: OPHTHALMOLOGY

## 2024-01-26 PROCEDURE — 99999 PR PBB SHADOW E&M-EST. PATIENT-LVL II: CPT | Mod: PBBFAC,HCNC,, | Performed by: OPHTHALMOLOGY

## 2024-01-26 PROCEDURE — 92004 COMPRE OPH EXAM NEW PT 1/>: CPT | Mod: HCNC,S$GLB,, | Performed by: OPHTHALMOLOGY

## 2024-01-26 PROCEDURE — 1159F MED LIST DOCD IN RCRD: CPT | Mod: HCNC,CPTII,S$GLB, | Performed by: OPHTHALMOLOGY

## 2024-01-26 PROCEDURE — 1101F PT FALLS ASSESS-DOCD LE1/YR: CPT | Mod: HCNC,CPTII,S$GLB, | Performed by: OPHTHALMOLOGY

## 2024-01-26 PROCEDURE — 3288F FALL RISK ASSESSMENT DOCD: CPT | Mod: HCNC,CPTII,S$GLB, | Performed by: OPHTHALMOLOGY

## 2024-01-27 NOTE — PROGRESS NOTES
Subjective:       Patient ID: Gale Fernández is a 78 y.o. female.    Chief Complaint: Cataract    HPI    78 y.o female is here for Cataract Evaluation. Difficulty with driving at   night especially at night with bright, tearing since about a year. Has had   floaters for a long time but no changes, no flashes of light. Wears   glasses and contact lenses.  No gtts  Last edited by Pavan Harris on 1/26/2024 10:40 AM.             Assessment:       1. Nuclear sclerosis of both eyes    2. Cortical cataract of both eyes    3. Vitreous detachment of both eyes    4. Refractive error        Plan:       Visually significant cataract OU -Pt to consider Sx.    PVD's OU-Stable.  RE-Pt will need to d/c SCL wear x 2 wks & repeat IOL calculations when ready to schedule CE OU.      Pt to call to schedule repeat IOL calculations when out of SCL's x 2 wks.

## 2024-02-11 ENCOUNTER — PATIENT MESSAGE (OUTPATIENT)
Dept: ADMINISTRATIVE | Facility: OTHER | Age: 79
End: 2024-02-11
Payer: MEDICARE

## 2024-02-26 ENCOUNTER — TELEPHONE (OUTPATIENT)
Dept: FAMILY MEDICINE | Facility: CLINIC | Age: 79
End: 2024-02-26
Payer: MEDICARE

## 2024-02-27 ENCOUNTER — PATIENT MESSAGE (OUTPATIENT)
Dept: FAMILY MEDICINE | Facility: CLINIC | Age: 79
End: 2024-02-27
Payer: MEDICARE

## 2024-02-27 DIAGNOSIS — R30.0 DYSURIA: Primary | ICD-10-CM

## 2024-02-27 NOTE — TELEPHONE ENCOUNTER
Spoke with patient. Patient was sent an evisit. Patient states she will contact the specialist first. She will complete the evisit if she cannot get an eary appointment.

## 2024-02-27 NOTE — TELEPHONE ENCOUNTER
Consult order placed.  Not sure how soon she can get a visit with the specialist.  Recommend she do another visit to treat current symptoms - either in person, virtual or e-visit if she is not able to get a visit soon.

## 2024-02-28 ENCOUNTER — OFFICE VISIT (OUTPATIENT)
Dept: UROLOGY | Facility: CLINIC | Age: 79
End: 2024-02-28
Payer: MEDICARE

## 2024-02-28 DIAGNOSIS — R30.0 DYSURIA: ICD-10-CM

## 2024-02-28 DIAGNOSIS — N39.0 RECURRENT UTI: ICD-10-CM

## 2024-02-28 DIAGNOSIS — N95.8 GENITOURINARY SYNDROME OF MENOPAUSE: Primary | ICD-10-CM

## 2024-02-28 PROCEDURE — 3288F FALL RISK ASSESSMENT DOCD: CPT | Mod: HCNC,CPTII,S$GLB, | Performed by: STUDENT IN AN ORGANIZED HEALTH CARE EDUCATION/TRAINING PROGRAM

## 2024-02-28 PROCEDURE — 1101F PT FALLS ASSESS-DOCD LE1/YR: CPT | Mod: HCNC,CPTII,S$GLB, | Performed by: STUDENT IN AN ORGANIZED HEALTH CARE EDUCATION/TRAINING PROGRAM

## 2024-02-28 PROCEDURE — 87186 SC STD MICRODIL/AGAR DIL: CPT | Mod: HCNC | Performed by: STUDENT IN AN ORGANIZED HEALTH CARE EDUCATION/TRAINING PROGRAM

## 2024-02-28 PROCEDURE — 99204 OFFICE O/P NEW MOD 45 MIN: CPT | Mod: HCNC,S$GLB,, | Performed by: STUDENT IN AN ORGANIZED HEALTH CARE EDUCATION/TRAINING PROGRAM

## 2024-02-28 PROCEDURE — 87086 URINE CULTURE/COLONY COUNT: CPT | Mod: HCNC | Performed by: STUDENT IN AN ORGANIZED HEALTH CARE EDUCATION/TRAINING PROGRAM

## 2024-02-28 PROCEDURE — 87088 URINE BACTERIA CULTURE: CPT | Mod: HCNC | Performed by: STUDENT IN AN ORGANIZED HEALTH CARE EDUCATION/TRAINING PROGRAM

## 2024-02-28 PROCEDURE — 99999 PR PBB SHADOW E&M-EST. PATIENT-LVL III: CPT | Mod: PBBFAC,HCNC,, | Performed by: STUDENT IN AN ORGANIZED HEALTH CARE EDUCATION/TRAINING PROGRAM

## 2024-02-28 PROCEDURE — 1159F MED LIST DOCD IN RCRD: CPT | Mod: HCNC,CPTII,S$GLB, | Performed by: STUDENT IN AN ORGANIZED HEALTH CARE EDUCATION/TRAINING PROGRAM

## 2024-02-28 PROCEDURE — 87077 CULTURE AEROBIC IDENTIFY: CPT | Mod: HCNC | Performed by: STUDENT IN AN ORGANIZED HEALTH CARE EDUCATION/TRAINING PROGRAM

## 2024-02-28 RX ORDER — AMPICILLIN 500 MG/1
500 CAPSULE ORAL EVERY 6 HOURS
Qty: 20 CAPSULE | Refills: 0 | Status: SHIPPED | OUTPATIENT
Start: 2024-02-28 | End: 2024-03-01

## 2024-02-28 NOTE — PROGRESS NOTES
Patient ID: Gale Fernández is a 78 y.o. female.    Chief Complaint: Dysuria    Referral: Vickie Hurtado MD  [address] [facility]  [place] [id number]     [facility]  78 y.o. who presents to the Urology clinic for evaluation of dysuria. Patient received two rounds of antibiotics without urine culture for UTI symptoms. Patient with discomfort and pressure while voiding. Noted symptoms improved with macrobid but returned. Denies constipation. Drinks 2 bottles of water. Denies personal hx of stones, there is FH of stones. Denies gross hematuria.  Medically Necessary ROS documented in HPI    Past Medical History  Active Ambulatory Problems     Diagnosis Date Noted    Essential hypertension     Osteopenia after menopause     Persistent atrial fibrillation 06/20/2013    Borderline hyperlipidemia     Current use of long term anticoagulation 07/21/2016    Prediabetes 06/12/2019    History of stroke 09/18/2019    History of TIA (transient ischemic attack) 10/10/2019    Obesity (BMI 30-39.9) 07/13/2020    Anxiety 07/15/2020    Burning mouth syndrome 07/15/2020    Wears contact lenses 03/15/2021    Refractive error 03/15/2021    Nuclear sclerosis of both eyes 03/16/2021    Dysuria 09/25/2023    Sinus congestion 09/25/2023     Resolved Ambulatory Problems     Diagnosis Date Noted    Abdominal wall pain in suprapubic region 12/02/2012    Microscopic hematuria 12/02/2012    SVT (supraventricular tachycardia) 06/20/2013    Palpitations 06/20/2013    Obesity, Class I, BMI 30-34.9     UTI (urinary tract infection) 08/17/2013    Visit for monitoring Multaq therapy 07/21/2016    TIA (transient ischemic attack) 09/19/2019    Colon cancer screening 08/21/2020     Past Medical History:   Diagnosis Date    Atrial fibrillation     Cataract     History of abnormal Pap smear     History of anxiety disorder     History of hypertension     Hypertension     Osteopenia     Overweight(278.02)     Stroke 2019         Past Surgical History  Past Surgical  History:   Procedure Laterality Date     SECTION, LOW TRANSVERSE      X1    COLONOSCOPY N/A 2020    Procedure: COLONOSCOPY;  Surgeon: Maicol Johnson MD;  Location: [facility] ([address] [place] [address]);  Service: Endoscopy;  Laterality: N/A;  Covid test on . Pt. holding Xarelto for 2 days prior to.EC    left knee meniscus surgery      neck tuck and liposuction      PSVT ablation      RADIOFREQUENCY ABLATION      Skin cancer removal on the left eye         Social History  Social Connections: Unknown (2024)    Social Connection and Isolation Panel [NHANES]     Frequency of Communication with Friends and Family: More than three times a week     Frequency of Social Gatherings with Friends and Family: Three times a week     Attends Sikh Services: Not on file     Active Member of Clubs or Organizations: Yes     Attends Club or Organization Meetings: 1 to 4 times per year     Marital Status:        Medications    Current Outpatient Medications:     alendronate (FOSAMAX) 70 MG tablet, Take 1 tablet (70 mg total) by mouth every 7 days. (Patient not taking: Reported on 2023), Disp: 4 tablet, Rfl: 3    ALPRAZolam (XANAX) 0.25 MG tablet, TAKE 1 TABLET BY MOUTH AT BEDTIME AS NEEDED FOR ANXIETRY, Disp: 30 tablet, Rfl: 0    amLODIPine (NORVASC) 5 MG tablet, TAKE 1 TABLET EVERY DAY, Disp: 90 tablet, Rfl: 2    atorvastatin (LIPITOR) 20 MG tablet, TAKE 1 TABLET EVERY DAY, Disp: 90 tablet, Rfl: 1    calcium-vitamin D3 (CALCIUM 500 + D) 500 mg-5 mcg (200 unit) per tablet, Take 1 tablet by mouth 2 (two) times daily with meals., Disp: 60 tablet, Rfl: 3    fluticasone propionate (FLONASE) 50 mcg/actuation nasal spray, 1 spray (50 mcg total) by Each Nostril route daily as needed for Rhinitis., Disp: 15.8 mL, Rfl: 1    furosemide (LASIX) 20 MG tablet, Take 1 tablet (20 mg total) by mouth 2 (two) times daily as needed (Leg Swelling)., Disp: 90 tablet, Rfl: 2    gabapentin (NEURONTIN) 100 MG capsule, Take 1  capsule (100 mg total) by mouth 3 (three) times daily., Disp: 90 capsule, Rfl: 11    XARELTO 20 mg Tab, TAKE 1 TABLET EVERY EVENING, Disp: 90 tablet, Rfl: 10    Allergies  Review of patient's allergies indicates:   Allergen Reactions    Flecainide Other (See Comments)     Other reaction(s): worsening arrythmia    Multaq [dronedarone]      rash       Patient's PMH, FH, Social hx, Medications, allergies reviewed and updated as pertinent to today's visit    Objective:      Physical Exam  Constitutional:       Appearance: She is well-developed.   HENT:      Head: Normocephalic and atraumatic.   Eyes:      Conjunctiva/sclera: Conjunctivae normal.   Pulmonary:      Effort: Pulmonary effort is normal. No respiratory distress.   Abdominal:      General: Abdomen is flat. There is no distension.      Palpations: Abdomen is soft.   Skin:     General: Skin is warm.      Findings: No rash.   Neurological:      Mental Status: She is alert and oriented to person, place, and time.   Psychiatric:         Behavior: Behavior normal.             Assessment:       1. Genitourinary syndrome of menopause    2. Recurrent UTI        Plan:       RBUS to eval for nidus for stones  Ampicillin for uti  Ucx    Discussed with patient that only culture proven UTIs should be treated with antibiotics as many urinary symptoms mimic urinary tract infections. Excess exposure to antibiotics will contribute to multidrug resistance, increased risk of fungal infections, eliminate protective protective bacterial tawny and may be potentially harmful to the patient in the long run.        Constipation  Encouraged patient to drink more water, 8-10 glasses, and increase fiber intake. Constipation increases risk for development of UTIs, especially E coli UTIs.     Discussed role of probiotics and D mannose for prevention of UTIs   -Probiotics Available over the counter, continue    Recurrent UTI  Encouraged patient to drink more water as dehydration can be a  significant risk factor for recurrent UTIs.   Discussed role of cranberry supplement which can decrease adherence of pathogenic bacteria to bladder mucosa, the data is conflicting but this natural remedy    Post menopausal   Discussed with patient if conservative measures fail, mentioned above, can consider topical E2 management. Studies have shown as women age their vaginal tissues lose their elasticity increasing risk for development of UTIs, low dose local replacement can help decrease incidence.  Rx provided for topical estrogen    If conservative measures fail will consider renal ultrasound ( to evaluate anatomy), cystoscopy ( to rule out foreign body, diverticulum, stone, signs of fistula etc), urodynamics (eval bladder function, reflux) down the line.

## 2024-02-28 NOTE — PATIENT INSTRUCTIONS
Discussed with patient that only culture proven UTIs ( Urinary tract infections) should be treated with antibiotics as many urinary symptoms mimic urinary tract infections. Excess exposure to antibiotics will contribute to multidrug resistance, increased risk of fungal infections, eliminate protective protective bacterial tawny and may be potentially harmful to the patient in the long run.    Prophylactic antibiotics produce more harm than good and are not part of typical treatment plan for recurrent UTIs    Tips to decrease recurrent UTIs  - 8-10 glasses of water daily.   -Avoid constipation  -Strict control of elevated blood sugars in patients with diabetes. This is managed by the patient's PCP.     Post menopausal women are at increased risk of recurrent UTIs due to loss of estrogen.  Studies have shown as women age their vaginal tissues lose their elasticity increasing risk for development of UTIs, low dose local replacement ( not oral medication) can help decrease incidence.    If conservative measures fail will consider renal ultrasound ( to evaluate your internal anatomy), cystoscopy ( to rule out foreign body, diverticulum, stone, signs of fistula etc), urodynamics (evaluate the bladder function, reflux) down the line.

## 2024-03-01 ENCOUNTER — TELEPHONE (OUTPATIENT)
Dept: UROLOGY | Facility: CLINIC | Age: 79
End: 2024-03-01
Payer: MEDICARE

## 2024-03-01 ENCOUNTER — HOSPITAL ENCOUNTER (OUTPATIENT)
Dept: RADIOLOGY | Facility: HOSPITAL | Age: 79
Discharge: HOME | End: 2024-03-01
Attending: INTERNAL MEDICINE
Payer: MEDICARE

## 2024-03-01 ENCOUNTER — PATIENT MESSAGE (OUTPATIENT)
Dept: UROLOGY | Facility: CLINIC | Age: 79
End: 2024-03-01
Payer: MEDICARE

## 2024-03-01 DIAGNOSIS — Z12.31 ENCOUNTER FOR SCREENING MAMMOGRAM FOR BREAST CANCER: ICD-10-CM

## 2024-03-01 DIAGNOSIS — N30.90 CYSTITIS: ICD-10-CM

## 2024-03-01 DIAGNOSIS — N39.0 RECURRENT UTI: Primary | ICD-10-CM

## 2024-03-01 LAB — BACTERIA UR CULT: ABNORMAL

## 2024-03-01 PROCEDURE — 77067 SCR MAMMO BI INCL CAD: CPT | Mod: 26,HCNC,, | Performed by: RADIOLOGY

## 2024-03-01 PROCEDURE — 77063 BREAST TOMOSYNTHESIS BI: CPT | Mod: 26,HCNC,, | Performed by: RADIOLOGY

## 2024-03-01 PROCEDURE — 77067 SCR MAMMO BI INCL CAD: CPT | Mod: TC,HCNC,PO

## 2024-03-01 RX ORDER — CEPHALEXIN 500 MG/1
500 CAPSULE ORAL EVERY 12 HOURS
Qty: 10 CAPSULE | Refills: 0 | Status: SHIPPED | OUTPATIENT
Start: 2024-03-01 | End: 2024-03-06

## 2024-03-01 NOTE — TELEPHONE ENCOUNTER
----- Message from Tristen Bray MD sent at 3/1/2024 10:09 AM CST -----  Pls notify pt of new abx, keflex; stop ampicillin

## 2024-03-06 ENCOUNTER — HOSPITAL ENCOUNTER (OUTPATIENT)
Dept: RADIOLOGY | Facility: HOSPITAL | Age: 79
Discharge: HOME | End: 2024-03-06
Attending: STUDENT IN AN ORGANIZED HEALTH CARE EDUCATION/TRAINING PROGRAM
Payer: MEDICARE

## 2024-03-06 DIAGNOSIS — N39.0 RECURRENT UTI: ICD-10-CM

## 2024-03-06 PROCEDURE — 76770 US EXAM ABDO BACK WALL COMP: CPT | Mod: TC,HCNC

## 2024-03-06 PROCEDURE — 76770 US EXAM ABDO BACK WALL COMP: CPT | Mod: 26,HCNC,, | Performed by: RADIOLOGY

## 2024-03-08 ENCOUNTER — OFFICE VISIT (OUTPATIENT)
Dept: UROLOGY | Facility: CLINIC | Age: 79
End: 2024-03-08
Payer: MEDICARE

## 2024-03-08 DIAGNOSIS — N20.0 NEPHROLITHIASIS: Primary | ICD-10-CM

## 2024-03-08 DIAGNOSIS — N39.0 RECURRENT UTI: ICD-10-CM

## 2024-03-08 DIAGNOSIS — N95.8 GENITOURINARY SYNDROME OF MENOPAUSE: ICD-10-CM

## 2024-03-08 PROCEDURE — 1101F PT FALLS ASSESS-DOCD LE1/YR: CPT | Mod: HCNC,CPTII,S$GLB, | Performed by: STUDENT IN AN ORGANIZED HEALTH CARE EDUCATION/TRAINING PROGRAM

## 2024-03-08 PROCEDURE — 1160F RVW MEDS BY RX/DR IN RCRD: CPT | Mod: HCNC,CPTII,S$GLB, | Performed by: STUDENT IN AN ORGANIZED HEALTH CARE EDUCATION/TRAINING PROGRAM

## 2024-03-08 PROCEDURE — 3288F FALL RISK ASSESSMENT DOCD: CPT | Mod: HCNC,CPTII,S$GLB, | Performed by: STUDENT IN AN ORGANIZED HEALTH CARE EDUCATION/TRAINING PROGRAM

## 2024-03-08 PROCEDURE — 99214 OFFICE O/P EST MOD 30 MIN: CPT | Mod: HCNC,S$GLB,, | Performed by: STUDENT IN AN ORGANIZED HEALTH CARE EDUCATION/TRAINING PROGRAM

## 2024-03-08 PROCEDURE — 1159F MED LIST DOCD IN RCRD: CPT | Mod: HCNC,CPTII,S$GLB, | Performed by: STUDENT IN AN ORGANIZED HEALTH CARE EDUCATION/TRAINING PROGRAM

## 2024-03-08 PROCEDURE — 99999 PR PBB SHADOW E&M-EST. PATIENT-LVL III: CPT | Mod: PBBFAC,HCNC,, | Performed by: STUDENT IN AN ORGANIZED HEALTH CARE EDUCATION/TRAINING PROGRAM

## 2024-03-08 NOTE — PATIENT INSTRUCTIONS
"Prevention of Future Stones  Once your health care provider finds out why you are forming stones, he or she will give you tips on how to prevent them. This may include changing your diet and taking certain medications. There is no "one-size-fits-all" diet for preventing kidney stones. Everyone is different. Your diet may not be causing your stones to form. But there are dietary changes that you can make to stop stones from continuing to form.    Diet Changes  Drink enough fluids each day.  If you are not producing enough urine, your health care provider will recommend you drink at least 3 liters of liquid each day. This equals about 3 quarts (about ten 10-ounce glasses). This is a great way to lower your risk of forming new stones. Remember to drink more to replace fluids lost when you sweat from exercise or in hot weather. All fluids count toward your fluid intake. But it's best to drink mostly no-calorie or low-calorie drinks. This may mean limiting sugar-sweetened or alcoholic drinks.    Knowing how much you drink during the day can help you understand how much you need to drink to produce 2.5 liters of urine. Use a household measuring cup to measure how much liquid you drink for a day or two. Drink from bottles or cans with the fluid ounces listed on the label. Keep a log, and add up the ounces at the end of the day or 24-hour period. Use this total to be sure you are reaching your daily target urine amount of at least 85 ounces (2.5 liters) of urine daily.    Health care providers recommend people who form cystine stones drink more liquid than other stone formers. Usually 4 liters of liquid is advised to reduce cystine levels in your urine.    Reduce the amount of salt in your diet.  This tip is for people with high sodium intake and high urine calcium or cystine. Sodium can cause both urine calcium and cystine to be too high. Your health care provider may advise you to avoid foods that have a lot of salt. The " "Centers for Disease Control (CDC) and other health groups advise not eating more than 2,300 mg of salt per day. The following foods are high in salt and should be eaten in moderation:    Cheese (all types)  Most frozen foods and meats, including salty cured meats, deli meats (cold cuts), hot dogs, bratwurst and sausages  Canned soups and vegetables  Breads, bagels, rolls and baked goods  Salty snacks, like chips and pretzels  Bottled salad dressings and certain breakfast cereals  Pickles and olives  Casseroles, other "mixed" foods, pizza and lasagna  Canned and bottled sauces  Certain condiments, table salt and some spice blends  Eat the recommended amount of calcium.  If you take calcium supplements, make sure you aren't getting too much calcium. On the other hand make sure you aren't getting too little calcium either. Talk with your health care provider or dietitian about whether you need supplements. Good sources of calcium to choose from often are those low in salt. Eating calcium-rich foods or beverages with meals every day is a good habit. There are many non-dairy sources of calcium, such as calcium-fortified non-dairy milks. There are good choices, especially if you avoid dairy.    You can usually get enough calcium from your diet without supplements if you eat three-to-four servings of calcium-rich food. Many foods and beverages have calcium in them. Some foods and beverages that might be easy to include on a daily basis with meals are:      Table of Foods Rich in Calcium  Enlarge  Eat plenty of fruits and vegetables.  Eating at least five servings of fruits and vegetables daily is recommended for all people who form kidney stones. Eating fruits and vegetables give you potassium, fiber, magnesium, antioxidants, phytate and citrate, all of which may help keep stones from forming.    A serving means one piece of fruit or one potato or one cup of raw vegetables. For cooked vegetables, a serving is ½ cup. If you " are worried you may not be eating the right amount of fruits and vegetables, talk to your health care provider about what will be best for you.    Eat foods with low oxalate levels.  This recommendation is for patients with high urine oxalate. Eating calcium-rich foods (see table above) with meals can often control the oxalate level in your urine. Urinary oxalate is controlled because eating calcium lowers the oxalate level in your body. But if doing that does not control your urine oxalate, you may be asked to eat less of certain high-oxalate foods. Nearly all plant foods have oxalate, but a few foods contain a lot of it. These include spinach, rhubarb and almonds. It is usually not necessary to completely stop eating foods that contain oxalate. This needs to be determined individually and depends on why your oxalate levels are high in the first place.    Eat less meat.  If you make cystine or calcium oxalate stones and your urine uric acid is high, your health care provider may tell you to eat less animal protein.    If your health care provider thinks your diet is increasing your risk for stones, he or she will tell you to eat less meat, fish, seafood, poultry, pork, lamb, mutton and game meat than you eat now. This might mean eating these foods once or twice rather than two or three times a day, fewer times during the week, or eating smaller portions when you do eat them. The amount to limit depends on how much you eat now and how much your diet is affecting your uric acid levels.    __      Medications  Changing your diet and increasing fluids may not be enough to prevent stones from forming. Your health care provider may give you medications to take to help with this. The type of stone and the urine abnormalities you have will help your health care provider decide if you need medicine and which medicine is best. Common medications include:    Thiazide diuretics  are for patients who have calcium stones and  high levels of calcium in their urine. Thiazides lower urine calcium by helping the kidney take calcium out of the urine and put it back in the blood stream. When taking thiazides, you need to limit how much salt you take in, as these medications work best when urine sodium is low.    Potassium citrate  is for patients with calcium stones and low urinary citrate, and for those with uric acid and cystine stones. Potassium citrate makes the urine less acidic or more alkaline (basic). This helps prevent cystine and uric acid stones. It also raises the citrate level in the urine, helping to prevent calcium stones.

## 2024-03-08 NOTE — PROGRESS NOTES
Patient ID: Gale Fernández is a 78 y.o. female.    Chief Complaint: Follow-up        HPI  78 y.o. who presents to the Urology clinic for evaluation of rec UTI. Patient here to review symptoms and MARIANNE. Patient notes since treatment of last UTI, her symptoms have resolved and she feels better. She denies flank pain or hx of stones.       Medically Necessary ROS documented in HPI    Past Medical History  Active Ambulatory Problems     Diagnosis Date Noted    Essential hypertension     Osteopenia after menopause     Persistent atrial fibrillation 2013    Borderline hyperlipidemia     Current use of long term anticoagulation 2016    Prediabetes 2019    History of stroke 2019    History of TIA (transient ischemic attack) 10/10/2019    Obesity (BMI 30-39.9) 2020    Anxiety 07/15/2020    Burning mouth syndrome 07/15/2020    Wears contact lenses 03/15/2021    Refractive error 03/15/2021    Nuclear sclerosis of both eyes 2021    Dysuria 2023    Sinus congestion 2023     Resolved Ambulatory Problems     Diagnosis Date Noted    Abdominal wall pain in suprapubic region 2012    Microscopic hematuria 2012    SVT (supraventricular tachycardia) 2013    Palpitations 2013    Obesity, Class I, BMI 30-34.9     UTI (urinary tract infection) 2013    Visit for monitoring Multaq therapy 2016    TIA (transient ischemic attack) 2019    Colon cancer screening 2020     Past Medical History:   Diagnosis Date    Atrial fibrillation     Cataract     History of abnormal Pap smear     History of anxiety disorder     History of hypertension     Hypertension     Osteopenia     Overweight(278.02)     Stroke          Past Surgical History  Past Surgical History:   Procedure Laterality Date     SECTION, LOW TRANSVERSE      X1    COLONOSCOPY N/A 2020    Procedure: COLONOSCOPY;  Surgeon: Maicol Johnson MD;  [facility] ([address] [place] [address]);  Service:  Endoscopy;  Laterality: N/A;  Covid test on 8/18. Pt. holding Xarelto for 2 days prior to.EC    left knee meniscus surgery  2012    neck tuck and liposuction  2012    PSVT ablation      RADIOFREQUENCY ABLATION  2004    Skin cancer removal on the left eye         Social History  Social Connections: Unknown (1/21/2024)    Social Connection and Isolation Panel [NHANES]     Frequency of Communication with Friends and Family: More than three times a week     Frequency of Social Gatherings with Friends and Family: Three times a week     Attends Hinduism Services: Not on file     Active Member of Clubs or Organizations: Yes     Attends Club or Organization Meetings: 1 to 4 times per year     Marital Status:        Medications    Current Outpatient Medications:     alendronate (FOSAMAX) 70 MG tablet, Take 1 tablet (70 mg total) by mouth every 7 days. (Patient not taking: Reported on 12/20/2023), Disp: 4 tablet, Rfl: 3    ALPRAZolam (XANAX) 0.25 MG tablet, TAKE 1 TABLET BY MOUTH AT BEDTIME AS NEEDED FOR ANXIETRY, Disp: 30 tablet, Rfl: 0    amLODIPine (NORVASC) 5 MG tablet, TAKE 1 TABLET EVERY DAY, Disp: 90 tablet, Rfl: 2    atorvastatin (LIPITOR) 20 MG tablet, TAKE 1 TABLET EVERY DAY, Disp: 90 tablet, Rfl: 1    calcium-vitamin D3 (CALCIUM 500 + D) 500 mg-5 mcg (200 unit) per tablet, Take 1 tablet by mouth 2 (two) times daily with meals., Disp: 60 tablet, Rfl: 3    fluticasone propionate (FLONASE) 50 mcg/actuation nasal spray, 1 spray (50 mcg total) by Each Nostril route daily as needed for Rhinitis., Disp: 15.8 mL, Rfl: 1    furosemide (LASIX) 20 MG tablet, Take 1 tablet (20 mg total) by mouth 2 (two) times daily as needed (Leg Swelling)., Disp: 90 tablet, Rfl: 2    gabapentin (NEURONTIN) 100 MG capsule, Take 1 capsule (100 mg total) by mouth 3 (three) times daily., Disp: 90 capsule, Rfl: 11    XARELTO 20 mg Tab, TAKE 1 TABLET EVERY EVENING, Disp: 90 tablet, Rfl: 10    Allergies  Review of patient's allergies  indicates:   Allergen Reactions    Flecainide Other (See Comments)     Other reaction(s): worsening arrythmia    Multaq [dronedarone]      rash       Patient's PMH, FH, Social hx, Medications, allergies reviewed and updated as pertinent to today's visit    Objective:      Physical Exam  Constitutional:       Appearance: She is well-developed.   HENT:      Head: Normocephalic and atraumatic.   Eyes:      Conjunctiva/sclera: Conjunctivae normal.   Pulmonary:      Effort: Pulmonary effort is normal. No respiratory distress.   Abdominal:      General: Abdomen is flat. There is no distension.   Skin:     General: Skin is warm.      Findings: No rash.   Neurological:      Mental Status: She is alert and oriented to person, place, and time.   Psychiatric:         Behavior: Behavior normal.           Imaging results:   Non obstructing 6mm  R renal stone  Assessment:       1. Nephrolithiasis    2. Recurrent UTI    3. Genitourinary syndrome of menopause        Plan:         Nephrolithiasis  Asymptomatic, observation is reasonable as the risks of treatment outweigh benefits if asymptomatic at baseline  Discussed if increased frequency of stones or pt becomes symptomatic ( flank pain, etc) then treatment of stone would be encouraged sooner  RTC 6 months for MARIANNE    Recurrent UTI  Ensuring urine cultures to treat appropriate bacteria emphasized, can be conducted at any urgent care to prevent treatment delays     syndrome of menopause  Discussed though topical estrogen is advised, the decision is up to the patient on when to start- ant in the setting of recurrent UTIs
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
## 2024-05-03 ENCOUNTER — OFFICE VISIT (OUTPATIENT)
Dept: CARDIOLOGY | Facility: CLINIC | Age: 79
End: 2024-05-03
Payer: MEDICARE

## 2024-05-03 ENCOUNTER — HOSPITAL ENCOUNTER (OUTPATIENT)
Dept: CARDIOLOGY | Facility: CLINIC | Age: 79
Discharge: HOME | End: 2024-05-03
Payer: MEDICARE

## 2024-05-03 VITALS
OXYGEN SATURATION: 99 % | WEIGHT: 176.81 LBS | HEIGHT: 65 IN | HEART RATE: 80 BPM | BODY MASS INDEX: 29.46 KG/M2 | SYSTOLIC BLOOD PRESSURE: 139 MMHG | DIASTOLIC BLOOD PRESSURE: 82 MMHG

## 2024-05-03 DIAGNOSIS — E78.5 BORDERLINE HYPERLIPIDEMIA: ICD-10-CM

## 2024-05-03 DIAGNOSIS — I48.21 PERMANENT ATRIAL FIBRILLATION: ICD-10-CM

## 2024-05-03 DIAGNOSIS — R73.03 PREDIABETES: ICD-10-CM

## 2024-05-03 DIAGNOSIS — I10 ESSENTIAL HYPERTENSION: ICD-10-CM

## 2024-05-03 DIAGNOSIS — Z86.73 HISTORY OF STROKE: Primary | ICD-10-CM

## 2024-05-03 DIAGNOSIS — I48.19 PERSISTENT ATRIAL FIBRILLATION: ICD-10-CM

## 2024-05-03 LAB
OHS QRS DURATION: 82 MS
OHS QTC CALCULATION: 462 MS

## 2024-05-03 PROCEDURE — 99214 OFFICE O/P EST MOD 30 MIN: CPT | Mod: 25,S$GLB,, | Performed by: INTERNAL MEDICINE

## 2024-05-03 PROCEDURE — 3288F FALL RISK ASSESSMENT DOCD: CPT | Mod: CPTII,S$GLB,, | Performed by: INTERNAL MEDICINE

## 2024-05-03 PROCEDURE — 1159F MED LIST DOCD IN RCRD: CPT | Mod: CPTII,S$GLB,, | Performed by: INTERNAL MEDICINE

## 2024-05-03 PROCEDURE — 1101F PT FALLS ASSESS-DOCD LE1/YR: CPT | Mod: CPTII,S$GLB,, | Performed by: INTERNAL MEDICINE

## 2024-05-03 PROCEDURE — 3079F DIAST BP 80-89 MM HG: CPT | Mod: CPTII,S$GLB,, | Performed by: INTERNAL MEDICINE

## 2024-05-03 PROCEDURE — 93000 ELECTROCARDIOGRAM COMPLETE: CPT | Mod: S$GLB,,, | Performed by: INTERNAL MEDICINE

## 2024-05-03 PROCEDURE — 3075F SYST BP GE 130 - 139MM HG: CPT | Mod: CPTII,S$GLB,, | Performed by: INTERNAL MEDICINE

## 2024-05-03 PROCEDURE — 99999 PR PBB SHADOW E&M-EST. PATIENT-LVL III: CPT | Mod: PBBFAC,HCNC,, | Performed by: INTERNAL MEDICINE

## 2024-05-10 ENCOUNTER — PATIENT MESSAGE (OUTPATIENT)
Dept: CARDIOLOGY | Facility: CLINIC | Age: 79
End: 2024-05-10
Payer: MEDICARE

## 2024-05-10 ENCOUNTER — LAB VISIT (OUTPATIENT)
Dept: LAB | Facility: HOSPITAL | Age: 79
End: 2024-05-10
Attending: INTERNAL MEDICINE
Payer: MEDICARE

## 2024-05-10 DIAGNOSIS — E78.5 BORDERLINE HYPERLIPIDEMIA: ICD-10-CM

## 2024-05-10 DIAGNOSIS — I10 ESSENTIAL HYPERTENSION: ICD-10-CM

## 2024-05-10 DIAGNOSIS — R73.03 PREDIABETES: ICD-10-CM

## 2024-05-10 DIAGNOSIS — I48.19 PERSISTENT ATRIAL FIBRILLATION: ICD-10-CM

## 2024-05-10 LAB
ALBUMIN SERPL BCP-MCNC: 3.8 G/DL (ref 3.5–5.2)
ALP SERPL-CCNC: 60 U/L (ref 55–135)
ALT SERPL W/O P-5'-P-CCNC: 12 U/L (ref 10–44)
ANION GAP SERPL CALC-SCNC: 7 MMOL/L (ref 8–16)
AST SERPL-CCNC: 20 U/L (ref 10–40)
BASOPHILS # BLD AUTO: 0.03 K/UL (ref 0–0.2)
BASOPHILS NFR BLD: 0.4 % (ref 0–1.9)
BILIRUB SERPL-MCNC: 0.8 MG/DL (ref 0.1–1)
BUN SERPL-MCNC: 18 MG/DL (ref 8–23)
CALCIUM SERPL-MCNC: 9.6 MG/DL (ref 8.7–10.5)
CHLORIDE SERPL-SCNC: 107 MMOL/L (ref 95–110)
CHOLEST SERPL-MCNC: 135 MG/DL (ref 120–199)
CHOLEST/HDLC SERPL: 3 {RATIO} (ref 2–5)
CO2 SERPL-SCNC: 28 MMOL/L (ref 23–29)
CREAT SERPL-MCNC: 0.6 MG/DL (ref 0.5–1.4)
DIFFERENTIAL METHOD BLD: ABNORMAL
EOSINOPHIL # BLD AUTO: 0.3 K/UL (ref 0–0.5)
EOSINOPHIL NFR BLD: 3.9 % (ref 0–8)
ERYTHROCYTE [DISTWIDTH] IN BLOOD BY AUTOMATED COUNT: 12.8 % (ref 11.5–14.5)
EST. GFR  (NO RACE VARIABLE): >60 ML/MIN/1.73 M^2
ESTIMATED AVG GLUCOSE: 114 MG/DL (ref 68–131)
GLUCOSE SERPL-MCNC: 99 MG/DL (ref 70–110)
HBA1C MFR BLD: 5.6 % (ref 4–5.6)
HCT VFR BLD AUTO: 46.5 % (ref 37–48.5)
HDLC SERPL-MCNC: 45 MG/DL (ref 40–75)
HDLC SERPL: 33.3 % (ref 20–50)
HGB BLD-MCNC: 14.4 G/DL (ref 12–16)
IMM GRANULOCYTES # BLD AUTO: 0.02 K/UL (ref 0–0.04)
IMM GRANULOCYTES NFR BLD AUTO: 0.3 % (ref 0–0.5)
LDLC SERPL CALC-MCNC: 72.6 MG/DL (ref 63–159)
LYMPHOCYTES # BLD AUTO: 1.7 K/UL (ref 1–4.8)
LYMPHOCYTES NFR BLD: 23.6 % (ref 18–48)
MCH RBC QN AUTO: 31 PG (ref 27–31)
MCHC RBC AUTO-ENTMCNC: 31 G/DL (ref 32–36)
MCV RBC AUTO: 100 FL (ref 82–98)
MONOCYTES # BLD AUTO: 0.6 K/UL (ref 0.3–1)
MONOCYTES NFR BLD: 8.2 % (ref 4–15)
NEUTROPHILS # BLD AUTO: 4.5 K/UL (ref 1.8–7.7)
NEUTROPHILS NFR BLD: 63.6 % (ref 38–73)
NONHDLC SERPL-MCNC: 90 MG/DL
NRBC BLD-RTO: 0 /100 WBC
PLATELET # BLD AUTO: 243 K/UL (ref 150–450)
PMV BLD AUTO: 10.4 FL (ref 9.2–12.9)
POTASSIUM SERPL-SCNC: 4.8 MMOL/L (ref 3.5–5.1)
PROT SERPL-MCNC: 6.7 G/DL (ref 6–8.4)
RBC # BLD AUTO: 4.64 M/UL (ref 4–5.4)
SODIUM SERPL-SCNC: 142 MMOL/L (ref 136–145)
TRIGL SERPL-MCNC: 87 MG/DL (ref 30–150)
TSH SERPL DL<=0.005 MIU/L-ACNC: 1.38 UIU/ML (ref 0.4–4)
WBC # BLD AUTO: 6.99 K/UL (ref 3.9–12.7)

## 2024-05-10 PROCEDURE — 80061 LIPID PANEL: CPT | Performed by: INTERNAL MEDICINE

## 2024-05-10 PROCEDURE — 36415 COLL VENOUS BLD VENIPUNCTURE: CPT | Mod: PO | Performed by: INTERNAL MEDICINE

## 2024-05-10 PROCEDURE — 85025 COMPLETE CBC W/AUTO DIFF WBC: CPT | Performed by: INTERNAL MEDICINE

## 2024-05-10 PROCEDURE — 84443 ASSAY THYROID STIM HORMONE: CPT | Performed by: INTERNAL MEDICINE

## 2024-05-10 PROCEDURE — 83036 HEMOGLOBIN GLYCOSYLATED A1C: CPT | Performed by: INTERNAL MEDICINE

## 2024-05-10 PROCEDURE — 80053 COMPREHEN METABOLIC PANEL: CPT | Performed by: INTERNAL MEDICINE

## 2024-05-11 ENCOUNTER — TELEPHONE (OUTPATIENT)
Dept: CARDIOLOGY | Facility: HOSPITAL | Age: 79
End: 2024-05-11
Payer: MEDICARE

## 2024-05-11 DIAGNOSIS — E78.5 BORDERLINE HYPERLIPIDEMIA: ICD-10-CM

## 2024-05-11 RX ORDER — ATORVASTATIN CALCIUM 20 MG/1
20 TABLET, FILM COATED ORAL DAILY
Qty: 90 TABLET | Refills: 3 | Status: SHIPPED | OUTPATIENT
Start: 2024-05-11

## 2024-05-16 RX ORDER — GABAPENTIN 100 MG/1
100 CAPSULE ORAL 3 TIMES DAILY
Qty: 90 CAPSULE | Refills: 11 | Status: SHIPPED | OUTPATIENT
Start: 2024-05-16 | End: 2025-05-16

## 2024-05-17 ENCOUNTER — HOSPITAL ENCOUNTER (OUTPATIENT)
Dept: CARDIOLOGY | Facility: HOSPITAL | Age: 79
Discharge: HOME | End: 2024-05-17
Attending: INTERNAL MEDICINE
Payer: MEDICARE

## 2024-05-17 VITALS
HEART RATE: 80 BPM | HEIGHT: 65 IN | BODY MASS INDEX: 29.38 KG/M2 | SYSTOLIC BLOOD PRESSURE: 139 MMHG | DIASTOLIC BLOOD PRESSURE: 82 MMHG | WEIGHT: 176.38 LBS

## 2024-05-17 DIAGNOSIS — Z86.73 HISTORY OF STROKE: ICD-10-CM

## 2024-05-17 DIAGNOSIS — I48.19 PERSISTENT ATRIAL FIBRILLATION: ICD-10-CM

## 2024-05-17 LAB
ASCENDING AORTA: 3.2 CM
AV INDEX (PROSTH): 0.75
AV MEAN GRADIENT: 3 MMHG
AV PEAK GRADIENT: 6 MMHG
AV VALVE AREA BY VELOCITY RATIO: 3.27 CM²
AV VALVE AREA: 3.16 CM²
AV VELOCITY RATIO: 0.78
BSA FOR ECHO PROCEDURE: 1.91 M2
CV ECHO LV RWT: 0.42 CM
DOP CALC AO PEAK VEL: 1.18 M/S
DOP CALC AO VTI: 25.24 CM
DOP CALC LVOT AREA: 4.2 CM2
DOP CALC LVOT DIAMETER: 2.31 CM
DOP CALC LVOT PEAK VEL: 0.92 M/S
DOP CALC LVOT STROKE VOLUME: 79.67 CM3
DOP CALCLVOT PEAK VEL VTI: 19.02 CM
E/E' RATIO: 9.09 M/S
ECHO LV POSTERIOR WALL: 0.89 CM (ref 0.6–1.1)
FRACTIONAL SHORTENING: 27 % (ref 28–44)
INTERVENTRICULAR SEPTUM: 0.84 CM (ref 0.6–1.1)
IVRT: 72.31 MSEC
LA MAJOR: 5.7 CM
LA MINOR: 5.7 CM
LA WIDTH: 4 CM
LEFT ATRIUM SIZE: 4.53 CM
LEFT ATRIUM VOLUME INDEX MOD: 22.1 ML/M2
LEFT ATRIUM VOLUME INDEX: 46.9 ML/M2
LEFT ATRIUM VOLUME MOD: 41.32 CM3
LEFT ATRIUM VOLUME: 87.79 CM3
LEFT INTERNAL DIMENSION IN SYSTOLE: 3.09 CM (ref 2.1–4)
LEFT VENTRICLE DIASTOLIC VOLUME INDEX: 42.25 ML/M2
LEFT VENTRICLE DIASTOLIC VOLUME: 79 ML
LEFT VENTRICLE MASS INDEX: 60 G/M2
LEFT VENTRICLE SYSTOLIC VOLUME INDEX: 20.1 ML/M2
LEFT VENTRICLE SYSTOLIC VOLUME: 37.56 ML
LEFT VENTRICULAR INTERNAL DIMENSION IN DIASTOLE: 4.21 CM (ref 3.5–6)
LEFT VENTRICULAR MASS: 112.89 G
LV LATERAL E/E' RATIO: 8.33 M/S
LV SEPTAL E/E' RATIO: 10 M/S
MV PEAK E VEL: 1 M/S
OHS CV RV/LV RATIO: 1.02 CM
PISA TR MAX VEL: 2.46 M/S
RA MAJOR: 6.1 CM
RA PRESSURE ESTIMATED: 8 MMHG
RA WIDTH: 4.5 CM
RIGHT VENTRICULAR END-DIASTOLIC DIMENSION: 4.3 CM
RV TB RVSP: 10 MMHG
SINUS: 3.12 CM
STJ: 2.58 CM
TDI LATERAL: 0.12 M/S
TDI SEPTAL: 0.1 M/S
TDI: 0.11 M/S
TR MAX PG: 24 MMHG
TRICUSPID ANNULAR PLANE SYSTOLIC EXCURSION: 2.02 CM
TV REST PULMONARY ARTERY PRESSURE: 32 MMHG
Z-SCORE OF LEFT VENTRICULAR DIMENSION IN END DIASTOLE: -2.14
Z-SCORE OF LEFT VENTRICULAR DIMENSION IN END SYSTOLE: -0.31

## 2024-05-17 PROCEDURE — 93306 TTE W/DOPPLER COMPLETE: CPT

## 2024-05-17 PROCEDURE — 93306 TTE W/DOPPLER COMPLETE: CPT | Mod: 26,,, | Performed by: INTERNAL MEDICINE

## 2024-06-12 ENCOUNTER — PATIENT MESSAGE (OUTPATIENT)
Dept: RESEARCH | Facility: CLINIC | Age: 79
End: 2024-06-12
Payer: MEDICARE

## 2024-07-04 DIAGNOSIS — I10 ESSENTIAL HYPERTENSION: ICD-10-CM

## 2024-07-04 NOTE — TELEPHONE ENCOUNTER
No care due was identified.  [facility] Embedded Care Due Messages. Reference number: [id number].   7/04/2024 9:56:30 AM CDT

## 2024-07-05 RX ORDER — AMLODIPINE BESYLATE 5 MG/1
TABLET ORAL
Qty: 90 TABLET | Refills: 0 | Status: SHIPPED | OUTPATIENT
Start: 2024-07-05

## 2024-07-05 NOTE — TELEPHONE ENCOUNTER
Refill Decision Note   [place] J[facility]  is requesting a refill authorization.  Brief Assessment and Rationale for Refill:  Approve     Medication Therapy Plan:         Comments:     Note composed:11:27 AM 07/05/2024

## 2024-07-11 DIAGNOSIS — E78.5 BORDERLINE HYPERLIPIDEMIA: ICD-10-CM

## 2024-07-11 RX ORDER — ATORVASTATIN CALCIUM 20 MG/1
20 TABLET, FILM COATED ORAL
Qty: 90 TABLET | Refills: 0 | Status: SHIPPED | OUTPATIENT
Start: 2024-07-11

## 2024-07-11 NOTE — TELEPHONE ENCOUNTER
Refill Decision Note   [place] Jolene  is requesting a refill authorization.  Brief Assessment and Rationale for Refill:  Approve     Medication Therapy Plan:         Comments:     Note composed:2:16 PM 07/11/2024

## 2024-07-11 NOTE — TELEPHONE ENCOUNTER
No care due was identified.  [facility] Embedded Care Due Messages. Reference number: [id number].   7/11/2024 10:39:44 AM CDT

## 2024-08-08 ENCOUNTER — PATIENT MESSAGE (OUTPATIENT)
Dept: ADMINISTRATIVE | Facility: OTHER | Age: 79
End: 2024-08-08
Payer: MEDICARE

## 2024-08-24 ENCOUNTER — HOSPITAL ENCOUNTER (EMERGENCY)
Facility: HOSPITAL | Age: 79
Discharge: HOME | End: 2024-08-24
Attending: EMERGENCY MEDICINE
Payer: MEDICARE

## 2024-08-24 VITALS
HEART RATE: 98 BPM | RESPIRATION RATE: 19 BRPM | OXYGEN SATURATION: 100 % | HEIGHT: 65 IN | SYSTOLIC BLOOD PRESSURE: 125 MMHG | WEIGHT: 174 LBS | TEMPERATURE: 98 F | BODY MASS INDEX: 28.99 KG/M2 | DIASTOLIC BLOOD PRESSURE: 80 MMHG

## 2024-08-24 DIAGNOSIS — S22.32XA CLOSED FRACTURE OF ONE RIB OF LEFT SIDE, INITIAL ENCOUNTER: Primary | ICD-10-CM

## 2024-08-24 DIAGNOSIS — R07.81 RIB PAIN ON LEFT SIDE: ICD-10-CM

## 2024-08-24 PROCEDURE — 99283 EMERGENCY DEPT VISIT LOW MDM: CPT | Mod: 25,HCNC,ER

## 2024-08-24 RX ORDER — HYDROCODONE BITARTRATE AND ACETAMINOPHEN 5; 325 MG/1; MG/1
1 TABLET ORAL EVERY 6 HOURS PRN
Qty: 12 TABLET | Refills: 0 | Status: SHIPPED | OUTPATIENT
Start: 2024-08-24

## 2024-08-24 NOTE — DISCHARGE INSTRUCTIONS
Take pain medicine as needed. Hold pressure against sore area if coughing or laughing. Return to ER for severe pain, shortness of breath, or coughing up blood.

## 2024-08-24 NOTE — ED PROVIDER NOTES
"Encounter Date: 2024    SCRIBE #1 NOTE: I, Leia Galilea, am scribing for, and in the presence of,  Tiffany Eubanks MD. I have scribed the following portions of the note - Other sections scribed: HPI, ROS.   SCRIBE #2 NOTE: I, Victorina Tripp, am scribing for, and in the presence of,  Tiffany Eubanks MD. I have scribed the remaining portions of the note not scribed by Scribe #1.     History     Chief Complaint   Patient presents with    Fall     Patient reports falling and injuring left ribs today. Denies head injury, denies LOC, denies additional complaints of injury and/or pain.      79 y.o. female with PMHx of HTN, HLD, and A-fib, presents to the ED for chief complaint of left ribcage injury that occurred earlier today. Patient reports she lost her footing while going up a small step and fell onto the concrete, landing on her left side. Patient reports "soreness" to lateral left ribs. Patient did not attempt treatment PTA. Denies any SOB, hemoptysis, or other associated symptoms. Patient reports anticoagulant use (Xarelto). NDKA.     The history is provided by the patient. No  was used.     Review of patient's allergies indicates:   Allergen Reactions    Flecainide Other (See Comments)     Other reaction(s): worsening arrythmia    Multaq [dronedarone]      rash     Past Medical History:   Diagnosis Date    Atrial fibrillation     Followed by EP cardiology    Borderline hyperlipidemia     Cataract     History of abnormal Pap smear     More than 20 years ago; status post colposcopy    History of anxiety disorder     History of hypertension     Currently doing okay off medication    Hypertension     Osteopenia     Refuses medication    Overweight(278.02)     Stroke 2019     Past Surgical History:   Procedure Laterality Date     SECTION, LOW TRANSVERSE      X1    COLONOSCOPY N/A 2020    Procedure: COLONOSCOPY;  Surgeon: Maicol Johnson MD;  Location: [facility] ([address] [place] [address];  Service: " Endoscopy;  Laterality: N/A;  Covid test on 8/18. Pt. holding Xarelto for 2 days prior to.EC    left knee meniscus surgery  2012    neck tuck and liposuction  2012    PSVT ablation      RADIOFREQUENCY ABLATION  2004    Skin cancer removal on the left eye       Family History   Problem Relation Name Age of Onset    Heart disease Mother      Cancer Father          nasopharynx    Breast cancer Cousin      Cancer Cousin          colon cancer    Hypertension Sister Yessi     Diabetes Sister Yessi     Cancer Sister Yessi     Ovarian cancer Sister Yessi     Coronary artery disease Brother Valery         s/p stenting    Hypertension Brother Valery     Rheum arthritis Sister Bel     Hypertension Brother Aramis     Hypertension Brother Guanakito     Heart disease Brother Guanakito         s/p CABG    Hypertension Brother Dwayne     Coronary artery disease Brother Dwayne         s/p stenting    Cancer Brother Dwayne     No Known Problems Maternal Grandmother      No Known Problems Maternal Grandfather      No Known Problems Paternal Grandmother      No Known Problems Paternal Grandfather      No Known Problems Maternal Aunt      No Known Problems Maternal Uncle      No Known Problems Paternal Aunt      No Known Problems Paternal Uncle      Colon cancer Neg Hx      Amblyopia Neg Hx      Blindness Neg Hx      Cataracts Neg Hx      Glaucoma Neg Hx      Macular degeneration Neg Hx      Retinal detachment Neg Hx      Strabismus Neg Hx      Stroke Neg Hx      Thyroid disease Neg Hx       Social History     Tobacco Use    Smoking status: Former     Types: Cigarettes    Smokeless tobacco: Never   Substance Use Topics    Alcohol use: Yes     Comment: Rarely    Drug use: No     Review of Systems   Constitutional:  Negative for fever.   HENT:  Negative for sore throat.    Respiratory:  Negative for cough (hemoptysis) and shortness of breath.    Cardiovascular:  Positive for chest pain (left lateral ribs just under breast).    Gastrointestinal:  Negative for nausea.   Genitourinary:  Negative for dysuria.   Musculoskeletal:  Negative for arthralgias and back pain.   Skin:  Negative for rash.   Neurological:  Negative for weakness.   Hematological:  Does not bruise/bleed easily.   All other systems reviewed and are negative.      Physical Exam     Initial Vitals [08/24/24 1239]   BP Pulse Resp Temp SpO2   122/81 100 18 98.2 °F (36.8 °C) 99 %      MAP       --         Physical Exam    Nursing note and vitals reviewed.  Constitutional: She appears well-developed and well-nourished. No distress.   HENT:   Head: Normocephalic and atraumatic.   Eyes: Conjunctivae are normal.   Neck:   Normal range of motion.  Cardiovascular:  Normal rate, regular rhythm and normal heart sounds.           No murmur heard.  Pulmonary/Chest: Effort normal and breath sounds normal. No respiratory distress. She has no decreased breath sounds. She exhibits tenderness.   Tenderness over lateral ribcage below left breast. No crepitus. No bruising or abrasions.   Abdominal: Abdomen is soft. Bowel sounds are normal. She exhibits no distension. There is no abdominal tenderness.   No LUQ ttp. There is no rebound and no guarding.   Musculoskeletal:         General: No tenderness or edema. Normal range of motion.      Cervical back: Normal range of motion.     Neurological: She is alert and oriented to person, place, and time. GCS score is 15. GCS eye subscore is 4. GCS verbal subscore is 5. GCS motor subscore is 6.   Skin: Skin is warm and dry. No abrasion and no bruising noted.   Psychiatric: She has a normal mood and affect. Her behavior is normal.         ED Course   Procedures  Labs Reviewed - No data to display       Imaging Results              X-Ray Ribs 2 View Left (Final result)  Result time 08/24/24 14:56:08      Final result by Micheal Oreilly MD (08/24/24 14:56:08)                   Impression:      No evidence of acute cardiopulmonary disease.    No  convincing evidence of acute displaced left-sided rib fracture. Questionable cortical irregularity of the left anterior 8th rib, could indicate nondisplaced fracture. Correlation point tenderness recommend      Electronically signed by: Micheal Oreilly  Date:    08/24/2024  Time:    14:56               Narrative:    EXAMINATION:  XR CHEST PA AND LATERAL; XR RIBS 2 VIEW LEFT    CLINICAL HISTORY:  Pleurodynia    TECHNIQUE:  PA and lateral views of the chest were performed.    Four view left rib series.    COMPARISON:  Chest radiograph performed 03/12/2020.    FINDINGS:  Cardiomediastinal contours appear grossly unchanged in within normal limits.    Lungs essentially clear.    No definite pneumothorax or large volume pleural effusion.    No acute findings in the visualized abdomen.    Osseous and soft tissue structures appear without definite acute change.  Scoliotic curvature of the spine.    No convincing evidence of acute displaced left-sided rib fracture.  Questionable cortical irregularity of the left anterior 8th rib, could indicate nondisplaced fracture.  Correlation point tenderness recommend                                       X-Ray Chest PA And Lateral (Final result)  Result time 08/24/24 14:56:08      Final result by Micheal Oreilly MD (08/24/24 14:56:08)                   Impression:      No evidence of acute cardiopulmonary disease.    No convincing evidence of acute displaced left-sided rib fracture. Questionable cortical irregularity of the left anterior 8th rib, could indicate nondisplaced fracture. Correlation point tenderness recommend      Electronically signed by: Micheal Oreilly  Date:    08/24/2024  Time:    14:56               Narrative:    EXAMINATION:  XR CHEST PA AND LATERAL; XR RIBS 2 VIEW LEFT    CLINICAL HISTORY:  Pleurodynia    TECHNIQUE:  PA and lateral views of the chest were performed.    Four view left rib series.    COMPARISON:  Chest radiograph performed  03/12/2020.    FINDINGS:  Cardiomediastinal contours appear grossly unchanged in within normal limits.    Lungs essentially clear.    No definite pneumothorax or large volume pleural effusion.    No acute findings in the visualized abdomen.    Osseous and soft tissue structures appear without definite acute change.  Scoliotic curvature of the spine.    No convincing evidence of acute displaced left-sided rib fracture.  Questionable cortical irregularity of the left anterior 8th rib, could indicate nondisplaced fracture.  Correlation point tenderness recommend                                       Medications - No data to display  Medical Decision Making  79 y.o. female with PMHx of HTN, HLD, and A-fib (on plavix), presents to the ED for chief complaint of acute traumatic left ribcage injury that occurred earlier today.    On exam, there is tenderness over lateral ribcage under left breast. No bruises or abrasions. Clear breath sounds, bilaterally.       In shared decision making with the patient I will order imaging. Xray with possible left lateral 8th rib fracture. No pneumothorax seen. Will treat pain with norco since pt is unable to take NSAIDs. Return precautions given.         Amount and/or Complexity of Data Reviewed  Radiology: ordered and independent interpretation performed. Decision-making details documented in ED Course.    Risk  Prescription drug management.            Scribe Attestation:   Scribe #1: I performed the above scribed service and the documentation accurately describes the services I performed. I attest to the accuracy of the note.  Scribe #2: I performed the above scribed service and the documentation accurately describes the services I performed. I attest to the accuracy of the note.                         I, Dr. Tiffany Eubanks, personally performed the services described in this documentation.   All medical record entries made by the scribe were at my direction and in my presence.   I have  reviewed the chart and agree that the record is accurate and complete.   Tiffany Eubanks MD.  1:58 PM 08/24/2024        Clinical Impression:  Final diagnoses:  [R07.81] Rib pain on left side  [S22.32XA] Closed fracture of one rib of left side, initial encounter (Primary)          ED Disposition Condition    Discharge Stable          ED Prescriptions       Medication Sig Dispense Start Date End Date Auth. Provider    HYDROcodone-acetaminophen (NORCO) 5-325 mg per tablet Take 1 tablet by mouth every 6 (six) hours as needed for Pain. 12 tablet 8/24/2024 -- Tiffany Eubanks MD          Follow-up Information       Follow up With Specialties Details Why Contact Info    [facility]  As needed [address] [address] [phone]             Tiffany Eubanks MD  08/24/24 7983

## 2024-09-03 ENCOUNTER — TELEPHONE (OUTPATIENT)
Dept: ADMINISTRATIVE | Facility: CLINIC | Age: 79
End: 2024-09-03
Payer: MEDICARE

## 2024-09-03 NOTE — TELEPHONE ENCOUNTER
Called pt; no answer; could not confirm appt or leave message due to line kept ringing; I was calling to confirm pt's in office E[facility] appt on 9/4/24.

## 2024-09-18 DIAGNOSIS — I10 ESSENTIAL HYPERTENSION: ICD-10-CM

## 2024-09-18 RX ORDER — AMLODIPINE BESYLATE 5 MG/1
TABLET ORAL
Qty: 90 TABLET | Refills: 0 | Status: SHIPPED | OUTPATIENT
Start: 2024-09-18

## 2024-09-18 NOTE — TELEPHONE ENCOUNTER
No care due was identified.  [facility] Embedded Care Due Messages. Reference number: [id number].   9/18/2024 10:25:28 AM CDT

## 2024-09-25 ENCOUNTER — TELEPHONE (OUTPATIENT)
Dept: UROLOGY | Facility: CLINIC | Age: 79
End: 2024-09-25
Payer: MEDICARE

## 2024-09-25 DIAGNOSIS — E78.5 BORDERLINE HYPERLIPIDEMIA: ICD-10-CM

## 2024-09-25 RX ORDER — ATORVASTATIN CALCIUM 20 MG/1
20 TABLET, FILM COATED ORAL
Qty: 90 TABLET | Refills: 0 | Status: SHIPPED | OUTPATIENT
Start: 2024-09-25

## 2024-09-25 NOTE — TELEPHONE ENCOUNTER
Refill Decision Note   Gale Jolene  is requesting a refill authorization.  Brief Assessment and Rationale for Refill:  Approve     Medication Therapy Plan:         Extended chart review required: Yes   Comments:     Note composed:3:21 PM 09/25/2024

## 2024-09-25 NOTE — TELEPHONE ENCOUNTER
Pt was contacted pt informed she is in need of a follow up appointment. Appt scheduled.    ----- Message from Rachael Marroquin sent at 9/25/2024  2:34 PM CDT -----  Regarding: uti  Name of caller:  Salud     What is the requesting detail: pt has UTII requesting urine culture. Please give her a call back to further discuss.       Can the clinic reply by [facility]:       What number to call back: [phone]

## 2024-09-25 NOTE — TELEPHONE ENCOUNTER
No care due was identified.  [facility] Embedded Care Due Messages. Reference number: [id number].   9/25/2024 10:28:14 AM CDT

## 2024-09-26 ENCOUNTER — OFFICE VISIT (OUTPATIENT)
Dept: UROLOGY | Facility: CLINIC | Age: 79
End: 2024-09-26
Payer: MEDICARE

## 2024-09-26 ENCOUNTER — TELEPHONE (OUTPATIENT)
Dept: UROLOGY | Facility: CLINIC | Age: 79
End: 2024-09-26

## 2024-09-26 VITALS — BODY MASS INDEX: 28.93 KG/M2 | WEIGHT: 173.81 LBS

## 2024-09-26 DIAGNOSIS — N20.0 NEPHROLITHIASIS: ICD-10-CM

## 2024-09-26 DIAGNOSIS — N30.01 ACUTE CYSTITIS WITH HEMATURIA: Primary | ICD-10-CM

## 2024-09-26 PROCEDURE — 87086 URINE CULTURE/COLONY COUNT: CPT | Mod: HCNC | Performed by: STUDENT IN AN ORGANIZED HEALTH CARE EDUCATION/TRAINING PROGRAM

## 2024-09-26 PROCEDURE — 3288F FALL RISK ASSESSMENT DOCD: CPT | Mod: HCNC,CPTII,S$GLB, | Performed by: STUDENT IN AN ORGANIZED HEALTH CARE EDUCATION/TRAINING PROGRAM

## 2024-09-26 PROCEDURE — 87088 URINE BACTERIA CULTURE: CPT | Mod: HCNC | Performed by: STUDENT IN AN ORGANIZED HEALTH CARE EDUCATION/TRAINING PROGRAM

## 2024-09-26 PROCEDURE — 1160F RVW MEDS BY RX/DR IN RCRD: CPT | Mod: HCNC,CPTII,S$GLB, | Performed by: STUDENT IN AN ORGANIZED HEALTH CARE EDUCATION/TRAINING PROGRAM

## 2024-09-26 PROCEDURE — 1101F PT FALLS ASSESS-DOCD LE1/YR: CPT | Mod: HCNC,CPTII,S$GLB, | Performed by: STUDENT IN AN ORGANIZED HEALTH CARE EDUCATION/TRAINING PROGRAM

## 2024-09-26 PROCEDURE — 1125F AMNT PAIN NOTED PAIN PRSNT: CPT | Mod: HCNC,CPTII,S$GLB, | Performed by: STUDENT IN AN ORGANIZED HEALTH CARE EDUCATION/TRAINING PROGRAM

## 2024-09-26 PROCEDURE — 87186 SC STD MICRODIL/AGAR DIL: CPT | Mod: HCNC | Performed by: STUDENT IN AN ORGANIZED HEALTH CARE EDUCATION/TRAINING PROGRAM

## 2024-09-26 PROCEDURE — 1159F MED LIST DOCD IN RCRD: CPT | Mod: HCNC,CPTII,S$GLB, | Performed by: STUDENT IN AN ORGANIZED HEALTH CARE EDUCATION/TRAINING PROGRAM

## 2024-09-26 PROCEDURE — 99213 OFFICE O/P EST LOW 20 MIN: CPT | Mod: HCNC,S$GLB,, | Performed by: STUDENT IN AN ORGANIZED HEALTH CARE EDUCATION/TRAINING PROGRAM

## 2024-09-26 PROCEDURE — 99999 PR PBB SHADOW E&M-EST. PATIENT-LVL III: CPT | Mod: PBBFAC,HCNC,, | Performed by: STUDENT IN AN ORGANIZED HEALTH CARE EDUCATION/TRAINING PROGRAM

## 2024-09-26 RX ORDER — CEPHALEXIN 500 MG/1
500 CAPSULE ORAL EVERY 12 HOURS
Qty: 10 CAPSULE | Refills: 0 | Status: SHIPPED | OUTPATIENT
Start: 2024-09-26 | End: 2024-10-01

## 2024-09-26 NOTE — TELEPHONE ENCOUNTER
Jose Luis scheduled.  ----- Message from Tristen Bray MD sent at 9/26/2024  3:07 PM CDT -----  Regarding: renal ultrasound  Pls schedule renal ultrasound, thanks

## 2024-09-26 NOTE — PROGRESS NOTES
Patient ID: Gale Fernández is a 79 y.o. female.    Chief Complaint: Follow-up (  Possible UTI/ follow up )    Referral: No referring provider defined for this encounter.     HPI  79 y.o. who presents to the Urology clinic for evaluation of dysuria, odor, pelvic pressure x 3 days. Denies nausea, vomiting, fevers, chills, chest pain, shortness of breath. Has known renal stone. Due for updated imaging.     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    Wears contact lenses 03/15/2021    Refractive error 03/15/2021    Nuclear sclerosis of both eyes 2021    Dysuria 2023    Sinus congestion 2023     Resolved Ambulatory Problems     Diagnosis Date Noted    Abdominal wall pain in suprapubic region 2012    Microscopic hematuria 2012    SVT (supraventricular tachycardia) 2013    Palpitations 2013    Obesity, Class I, BMI 30-34.9     UTI (urinary tract infection) 2013    Visit for monitoring Multaq therapy 2016    TIA (transient ischemic attack) 2019    Colon cancer screening 2020     Past Medical History:   Diagnosis Date    Atrial fibrillation     Cataract     History of abnormal Pap smear     History of anxiety disorder     History of hypertension     Hypertension     Osteopenia     Overweight(278.02)     Stroke          Past Surgical History  Past Surgical History:   Procedure Laterality Date     SECTION, LOW TRANSVERSE      X1    COLONOSCOPY N/A 2020    Procedure: COLONOSCOPY;   Surgeon: Maicol Johnson MD;  Location: [facility] ([address] [place] [address]);  Service: Endoscopy;  Laterality: N/A;  Covid test on 8/18. Pt. holding Xarelto for 2 days prior to.EC    left knee meniscus surgery  2012    neck tuck and liposuction  2012    PSVT ablation      RADIOFREQUENCY ABLATION  2004    Skin cancer removal on the left eye         Social History       Medications    Current Outpatient Medications:     amLODIPine (NORVASC) 5 MG tablet, TAKE 1 TABLET EVERY DAY, Disp: 90 tablet, Rfl: 0    atorvastatin (LIPITOR) 20 MG tablet, TAKE 1 TABLET EVERY DAY, Disp: 90 tablet, Rfl: 0    calcium-vitamin D3 (CALCIUM 500 + D) 500 mg-5 mcg (200 unit) per tablet, Take 1 tablet by mouth 2 (two) times daily with meals., Disp: 60 tablet, Rfl: 3    gabapentin (NEURONTIN) 100 MG capsule, Take 1 capsule (100 mg total) by mouth 3 (three) times daily., Disp: 90 capsule, Rfl: 11    HYDROcodone-acetaminophen (NORCO) 5-325 mg per tablet, Take 1 tablet by mouth every 6 (six) hours as needed for Pain., Disp: 12 tablet, Rfl: 0    rivaroxaban (XARELTO) 20 mg Tab, Take 1 tablet (20 mg total) by mouth every evening., Disp: 90 tablet, Rfl: 3    alendronate (FOSAMAX) 70 MG tablet, Take 1 tablet (70 mg total) by mouth every 7 days. (Patient not taking: Reported on 12/20/2023), Disp: 4 tablet, Rfl: 3    ALPRAZolam (XANAX) 0.25 MG tablet, TAKE 1 TABLET BY MOUTH AT BEDTIME AS NEEDED FOR ANXIETRY (Patient not taking: Reported on 5/3/2024), Disp: 30 tablet, Rfl: 0    cephALEXin (KEFLEX) 500 MG capsule, Take 1 capsule (500 mg total) by mouth every 12 (twelve) hours. for 5 days, Disp: 10 capsule, Rfl: 0    fluticasone propionate (FLONASE) 50 mcg/actuation nasal spray, 1 spray (50 mcg total) by Each Nostril route daily as needed for Rhinitis. (Patient not taking: Reported on 5/3/2024), Disp: 15.8 mL, Rfl: 1    furosemide (LASIX) 20 MG tablet, Take 1 tablet (20 mg total) by mouth 2 (two) times daily as needed (Leg Swelling). (Patient not taking: Reported on  5/3/2024), Disp: 90 tablet, Rfl: 2    Allergies  Review of patient's allergies indicates:  No Known Allergies    Patient's PMH, FH, Social hx, Medications, allergies reviewed and updated as pertinent to today's visit    Objective:      Physical Exam  Constitutional:       Appearance: She is well-developed.   HENT:      Head: Normocephalic and atraumatic.   Eyes:      Conjunctiva/sclera: Conjunctivae normal.   Pulmonary:      Effort: Pulmonary effort is normal. No respiratory distress.   Abdominal:      General: Abdomen is flat. There is no distension.      Palpations: Abdomen is soft. There is no mass.      Tenderness: There is no abdominal tenderness. There is no right CVA tenderness, left CVA tenderness or guarding.   Skin:     General: Skin is warm.      Findings: No rash.   Neurological:      Mental Status: She is alert and oriented to person, place, and time.   Psychiatric:         Behavior: Behavior normal.             Assessment:       1. Acute cystitis with hematuria    2. Nephrolithiasis        Plan:         Nephrolithiasis, no symptoms, due for surveillance renal ultrasound    Acute cystitis, uncomplicated  Microscopic hematuria noted  Urine culture keflex rx provided, will tailor according to ucx sensitivities      Patient VU

## 2024-09-28 LAB — BACTERIA UR CULT: ABNORMAL

## 2024-10-02 ENCOUNTER — PATIENT MESSAGE (OUTPATIENT)
Dept: RESEARCH | Facility: CLINIC | Age: 79
End: 2024-10-02
Payer: MEDICARE

## 2024-10-04 ENCOUNTER — PATIENT MESSAGE (OUTPATIENT)
Dept: UROLOGY | Facility: CLINIC | Age: 79
End: 2024-10-04
Payer: MEDICARE

## 2024-10-07 ENCOUNTER — PATIENT MESSAGE (OUTPATIENT)
Dept: UROLOGY | Facility: CLINIC | Age: 79
End: 2024-10-07
Payer: MEDICARE

## 2024-10-08 ENCOUNTER — TELEPHONE (OUTPATIENT)
Dept: UROLOGY | Facility: CLINIC | Age: 79
End: 2024-10-08
Payer: MEDICARE

## 2024-10-08 ENCOUNTER — HOSPITAL ENCOUNTER (OUTPATIENT)
Dept: RADIOLOGY | Facility: HOSPITAL | Age: 79
Discharge: HOME | End: 2024-10-08
Attending: STUDENT IN AN ORGANIZED HEALTH CARE EDUCATION/TRAINING PROGRAM
Payer: MEDICARE

## 2024-10-08 DIAGNOSIS — N20.0 NEPHROLITHIASIS: ICD-10-CM

## 2024-10-08 PROCEDURE — 76770 US EXAM ABDO BACK WALL COMP: CPT | Mod: 26,HCNC,, | Performed by: RADIOLOGY

## 2024-10-08 PROCEDURE — 76770 US EXAM ABDO BACK WALL COMP: CPT | Mod: TC,HCNC

## 2024-10-08 NOTE — TELEPHONE ENCOUNTER
----- Message from [employer] sent at 10/8/2024  1:11 PM CDT -----  Name of Who is calling :MARIELOS ARTEAGA [[id number]]        What is the request in detail:Pt would like to come in tomorrow if possible .Pt believes she has a UTI  Please assist         Can the clinic reply by [facility]:  no          What number to call back if not in [facility]: [phone]

## 2024-10-11 ENCOUNTER — OFFICE VISIT (OUTPATIENT)
Dept: UROLOGY | Facility: CLINIC | Age: 79
End: 2024-10-11
Payer: MEDICARE

## 2024-10-11 DIAGNOSIS — N39.0 RECURRENT UTI: ICD-10-CM

## 2024-10-11 DIAGNOSIS — N95.8 GENITOURINARY SYNDROME OF MENOPAUSE: ICD-10-CM

## 2024-10-11 DIAGNOSIS — N30.01 ACUTE CYSTITIS WITH HEMATURIA: Primary | ICD-10-CM

## 2024-10-11 PROCEDURE — 87086 URINE CULTURE/COLONY COUNT: CPT | Mod: HCNC | Performed by: STUDENT IN AN ORGANIZED HEALTH CARE EDUCATION/TRAINING PROGRAM

## 2024-10-11 PROCEDURE — 99999 PR PBB SHADOW E&M-EST. PATIENT-LVL II: CPT | Mod: PBBFAC,HCNC,, | Performed by: STUDENT IN AN ORGANIZED HEALTH CARE EDUCATION/TRAINING PROGRAM

## 2024-10-11 RX ORDER — ESTRADIOL 0.1 MG/G
1 CREAM VAGINAL
Qty: 42.5 G | Refills: 3 | Status: SHIPPED | OUTPATIENT
Start: 2024-10-14 | End: 2025-10-14

## 2024-10-11 RX ORDER — CEFDINIR 300 MG/1
300 CAPSULE ORAL EVERY 12 HOURS
Qty: 10 CAPSULE | Refills: 0 | Status: SHIPPED | OUTPATIENT
Start: 2024-10-11 | End: 2024-10-16

## 2024-10-11 NOTE — PROGRESS NOTES
Patient ID: Gale Fernández is a 79 y.o. female.    Chief Complaint:FU       HPI  79 y.o. who presents to the Urology clinic for evaluation of concern for UTI, she notes odor, urgency, frequency, dysuria. Notes pyridium has helped her symptoms, use of old ampicillin from a prior rx was taken for 1.5 days prior to presentation. Also notes lower back pain, which has improved with abx. 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    Wears contact lenses 03/15/2021    Refractive error 03/15/2021    Nuclear sclerosis of both eyes 2021    Dysuria 2023    Sinus congestion 2023     Resolved Ambulatory Problems     Diagnosis Date Noted    Abdominal wall pain in suprapubic region 2012    Microscopic hematuria 2012    SVT (supraventricular tachycardia) 2013    Palpitations 2013    Obesity, Class I, BMI 30-34.9     UTI (urinary tract infection) 2013    Visit for monitoring Multaq therapy 2016    TIA (transient ischemic attack) 2019    Colon cancer screening 2020     Past Medical History:   Diagnosis Date    Atrial fibrillation     Cataract     History of abnormal Pap smear     History of anxiety disorder     History of hypertension     Hypertension     Osteopenia     Overweight(278.02)     Stroke          Past Surgical History  Past Surgical History:   Procedure Laterality Date     SECTION, LOW TRANSVERSE      X1    COLONOSCOPY N/A  8/21/2020    Procedure: COLONOSCOPY;  Surgeon: Maicol Johnson MD;  Location: [facility] ([address] [place] [address]);  Service: Endoscopy;  Laterality: N/A;  Covid test on 8/18. Pt. holding Xarelto for 2 days prior to.EC    left knee meniscus surgery  2012    neck tuck and liposuction  2012    PSVT ablation      RADIOFREQUENCY ABLATION  2004    Skin cancer removal on the left eye         Social History       Medications    Current Outpatient Medications:     alendronate (FOSAMAX) 70 MG tablet, Take 1 tablet (70 mg total) by mouth every 7 days. (Patient not taking: Reported on 10/11/2024), Disp: 4 tablet, Rfl: 3    ALPRAZolam (XANAX) 0.25 MG tablet, TAKE 1 TABLET BY MOUTH AT BEDTIME AS NEEDED FOR ANXIETRY (Patient not taking: Reported on 10/11/2024), Disp: 30 tablet, Rfl: 0    amLODIPine (NORVASC) 5 MG tablet, TAKE 1 TABLET EVERY DAY, Disp: 90 tablet, Rfl: 0    atorvastatin (LIPITOR) 20 MG tablet, TAKE 1 TABLET EVERY DAY, Disp: 90 tablet, Rfl: 0    calcium-vitamin D3 (CALCIUM 500 + D) 500 mg-5 mcg (200 unit) per tablet, Take 1 tablet by mouth 2 (two) times daily with meals., Disp: 60 tablet, Rfl: 3    cefdinir (OMNICEF) 300 MG capsule, Take 1 capsule (300 mg total) by mouth every 12 (twelve) hours. for 5 days, Disp: 10 capsule, Rfl: 0    [START ON 10/14/2024] estradioL (ESTRACE) 0.01 % (0.1 mg/gram) vaginal cream, Place 1 g vaginally twice a week., Disp: 42.5 g, Rfl: 3    fluticasone propionate (FLONASE) 50 mcg/actuation nasal spray, 1 spray (50 mcg total) by Each Nostril route daily as needed for Rhinitis. (Patient not taking: Reported on 10/11/2024), Disp: 15.8 mL, Rfl: 1    furosemide (LASIX) 20 MG tablet, Take 1 tablet (20 mg total) by mouth 2 (two) times daily as needed (Leg Swelling). (Patient not taking: Reported on 10/11/2024), Disp: 90 tablet, Rfl: 2    gabapentin (NEURONTIN) 100 MG capsule, Take 1 capsule (100 mg total) by mouth 3 (three) times daily., Disp: 90 capsule, Rfl: 11    HYDROcodone-acetaminophen (NORCO) 5-325 mg per  tablet, Take 1 tablet by mouth every 6 (six) hours as needed for Pain., Disp: 12 tablet, Rfl: 0    rivaroxaban (XARELTO) 20 mg Tab, Take 1 tablet (20 mg total) by mouth every evening., Disp: 90 tablet, Rfl: 3    Allergies  Review of patient's allergies indicates:  No Known Allergies    Patient's PMH, FH, Social hx, Medications, allergies reviewed and updated as pertinent to today's visit    Objective:      Physical Exam  Constitutional:       Appearance: She is well-developed.   HENT:      Head: Normocephalic and atraumatic.   Eyes:      Conjunctiva/sclera: Conjunctivae normal.   Pulmonary:      Effort: Pulmonary effort is normal. No respiratory distress.   Abdominal:      General: Abdomen is flat. There is no distension.   Skin:     General: Skin is warm.      Findings: No rash.   Neurological:      Mental Status: She is alert and oriented to person, place, and time.   Psychiatric:         Behavior: Behavior normal.               Assessment:       1. Acute cystitis with hematuria    2. Genitourinary syndrome of menopause    3. Recurrent UTI        Plan:         Recurrent UTI   Chronic problem with acute exacerbation, not at treatment goal  Urine culture  MARIANNE without nidus, patient emptying her bladder appropriately    Rx provided cefdinir     syndrome of menopause/ Recurrent UTI  Genitourinary syndrome of menopause  -The efficacy of estrogen for the prevention of UTIs in post-menopausal women has been demonstrated in several studies. There appears to be a higher effectiveness rate in topically applied estrogen in the vagina with an improvement in lactobacillus concentrations, decreased vaginal pH and a decrease in UTI episodes.  -The use of oral estrogen for UTIs is controversial due to systemic side effects (e.g., increased risk of stroke and blood clots if oral estrogen is started five years after menopause)- which is why topical is advised      Visit today included increased complexity associated with the care  of the episodic problem acute on recurrent UTI addressed and managing the longitudinal care of the patient due to the serious and/or complex managed problem.

## 2024-10-12 LAB — BACTERIA UR CULT: NORMAL

## 2024-10-14 ENCOUNTER — TELEPHONE (OUTPATIENT)
Dept: UROLOGY | Facility: CLINIC | Age: 79
End: 2024-10-14
Payer: MEDICARE

## 2024-10-14 NOTE — TELEPHONE ENCOUNTER
Attempted to contact patient, rickie      ----- Message from Tristen Bray MD sent at 10/14/2024 10:01 AM CDT -----  Regarding: RE: Requesting advice  Avoid known bladder irritants:   Coffee - Regular & Decaf  Tea - caffeinated  Carbonated beverages - cola, non-lester, diet & caffeine-free  Alcohols - Beer, Red Wine, White Wine, Champagne  Fruits - Grapefruit, Lemon, Orange, Pineapple  Fruit Juices - Cranberry, Grapefruit, Orange, Pineapple  Vegetables - Tomato & Tomato Products  Flavor Enhancers - Hot peppers, Spicy foods, Chili, Horseradish, Vinegar, Monosodium glutamate (MSG)  Artificial Sweeteners - NutraSweet, Sweet 'N Low, Equal (sweetener), Saccharin  ----- Message -----  From: Mercedes Young MA  Sent: 10/14/2024   9:49 AM CDT  To: Tristen Bray MD  Subject: FW: Requesting advice                              ----- Message -----  From: Michelle Muro  Sent: 10/14/2024   9:33 AM CDT  To: Katarina Ramon Staff  Subject: Requesting advice                                .Type:  Needs Medical Advice/Symptom-based Call    Who Called: self     Symptoms (please be specific): pressure and frequent urination. What does the doctor recommend?     How long has patient had these symptoms:  3 weeks; states symptoms are better, but are still there    Would the patient rather a call back or a response via [facility]? Call     Best Call Back Number: .[phone]      Additional Information:

## 2024-10-14 NOTE — TELEPHONE ENCOUNTER
Patient has been notified & contacted to stop taking abx      ----- Message from Tristen Bray MD sent at 10/13/2024  2:28 PM CDT -----  Please alert patient to stop abx, no evidence of UTI. portal message sent

## 2024-10-22 ENCOUNTER — PATIENT MESSAGE (OUTPATIENT)
Dept: UROLOGY | Facility: CLINIC | Age: 79
End: 2024-10-22
Payer: MEDICARE

## 2024-10-23 ENCOUNTER — PATIENT MESSAGE (OUTPATIENT)
Dept: ADMINISTRATIVE | Facility: CLINIC | Age: 79
End: 2024-10-23
Payer: MEDICARE

## 2024-11-21 ENCOUNTER — PATIENT MESSAGE (OUTPATIENT)
Dept: FAMILY MEDICINE | Facility: CLINIC | Age: 79
End: 2024-11-21
Payer: MEDICARE

## 2024-11-21 PROBLEM — R30.0 DYSURIA: Status: RESOLVED | Noted: 2023-09-25 | Resolved: 2024-11-21

## 2024-12-02 ENCOUNTER — PATIENT MESSAGE (OUTPATIENT)
Dept: RESEARCH | Facility: CLINIC | Age: 79
End: 2024-12-02
Payer: MEDICARE

## 2024-12-02 ENCOUNTER — TELEPHONE (OUTPATIENT)
Dept: FAMILY MEDICINE | Facility: CLINIC | Age: 79
End: 2024-12-02
Payer: MEDICARE

## 2024-12-02 NOTE — TELEPHONE ENCOUNTER
Spoke with patient. Patient was advised that she is not due for any labs. Patient was advised to discuss orders for urine specimen when patient is seen in office. Patient verbalized understanding and does not have any other questions or concerns at this time.

## 2024-12-02 NOTE — TELEPHONE ENCOUNTER
----- Message from Lew Martinez sent at 12/2/2024 10:35 AM CST -----  Type: Lab    Caller is requesting to schedule their Lab appointment prior to annual appointment.    Order is not listed in EPIC.  Please enter order and contact patient to schedule.    Name of Caller:  Pt   Preferred Date and Time of Labs:    Date of Annual Appointment:  12/9  Where would they like the lab performed?    Would the patient rather a call back or a response via [facility]?  Callback   Best Call Back Number:  Telephone Information:  Mobile          [phone]     Additional Information:  Requesting a urine culture   Thank you.

## 2024-12-04 DIAGNOSIS — I10 ESSENTIAL HYPERTENSION: ICD-10-CM

## 2024-12-04 RX ORDER — AMLODIPINE BESYLATE 5 MG/1
TABLET ORAL
Qty: 90 TABLET | Refills: 0 | Status: SHIPPED | OUTPATIENT
Start: 2024-12-04

## 2024-12-04 NOTE — TELEPHONE ENCOUNTER
No care due was identified.  Health [facility] Embedded Care Due Messages. Reference number: [id number].   12/04/2024 10:15:15 AM CST

## 2024-12-04 NOTE — TELEPHONE ENCOUNTER
Refill Routing Note   Medication(s) are not appropriate for processing by [facility] for the following reason(s):        ED/Hospital Visit since last OV with provider  Patient not seen by provider within 15 months    [facility] action(s):  Defer             Appointments  past 12m or future 3m with PCP    Date Provider   Last Visit   7/7/2023 Vickie Hurtado MD   Next Visit   12/9/2024 Vickie Hurtado MD   ED visits in past 90 days: 0        Note composed:1:14 PM 12/04/2024

## 2024-12-09 ENCOUNTER — OFFICE VISIT (OUTPATIENT)
Dept: FAMILY MEDICINE | Facility: CLINIC | Age: 79
End: 2024-12-09
Payer: MEDICARE

## 2024-12-09 VITALS
WEIGHT: 177.81 LBS | HEART RATE: 82 BPM | TEMPERATURE: 99 F | OXYGEN SATURATION: 97 % | BODY MASS INDEX: 29.62 KG/M2 | HEIGHT: 65 IN | SYSTOLIC BLOOD PRESSURE: 128 MMHG | DIASTOLIC BLOOD PRESSURE: 68 MMHG

## 2024-12-09 DIAGNOSIS — Z86.73 HISTORY OF STROKE: ICD-10-CM

## 2024-12-09 DIAGNOSIS — E66.9 OBESITY (BMI 30-39.9): ICD-10-CM

## 2024-12-09 DIAGNOSIS — Z23 NEEDS FLU SHOT: ICD-10-CM

## 2024-12-09 DIAGNOSIS — N95.8 GENITOURINARY SYNDROME OF MENOPAUSE: ICD-10-CM

## 2024-12-09 DIAGNOSIS — I10 ESSENTIAL HYPERTENSION: ICD-10-CM

## 2024-12-09 DIAGNOSIS — Z78.0 OSTEOPENIA AFTER MENOPAUSE: ICD-10-CM

## 2024-12-09 DIAGNOSIS — M85.80 OSTEOPENIA AFTER MENOPAUSE: ICD-10-CM

## 2024-12-09 DIAGNOSIS — I48.21 PERMANENT ATRIAL FIBRILLATION: ICD-10-CM

## 2024-12-09 DIAGNOSIS — Z23 NEED FOR COVID-19 VACCINE: ICD-10-CM

## 2024-12-09 DIAGNOSIS — R73.03 PREDIABETES: ICD-10-CM

## 2024-12-09 DIAGNOSIS — G47.00 INSOMNIA, UNSPECIFIED TYPE: ICD-10-CM

## 2024-12-09 DIAGNOSIS — Z00.00 ROUTINE MEDICAL EXAM: Primary | ICD-10-CM

## 2024-12-09 DIAGNOSIS — E78.5 BORDERLINE HYPERLIPIDEMIA: ICD-10-CM

## 2024-12-09 PROCEDURE — G0008 ADMIN INFLUENZA VIRUS VAC: HCPCS | Mod: HCNC,S$GLB,, | Performed by: INTERNAL MEDICINE

## 2024-12-09 PROCEDURE — 3078F DIAST BP <80 MM HG: CPT | Mod: HCNC,CPTII,S$GLB, | Performed by: INTERNAL MEDICINE

## 2024-12-09 PROCEDURE — 99397 PER PM REEVAL EST PAT 65+ YR: CPT | Mod: HCNC,25,S$GLB, | Performed by: INTERNAL MEDICINE

## 2024-12-09 PROCEDURE — 90653 IIV ADJUVANT VACCINE IM: CPT | Mod: HCNC,S$GLB,, | Performed by: INTERNAL MEDICINE

## 2024-12-09 PROCEDURE — 99999 PR PBB SHADOW E&M-EST. PATIENT-LVL IV: CPT | Mod: PBBFAC,HCNC,, | Performed by: INTERNAL MEDICINE

## 2024-12-09 PROCEDURE — 1126F AMNT PAIN NOTED NONE PRSNT: CPT | Mod: HCNC,CPTII,S$GLB, | Performed by: INTERNAL MEDICINE

## 2024-12-09 PROCEDURE — 1159F MED LIST DOCD IN RCRD: CPT | Mod: HCNC,CPTII,S$GLB, | Performed by: INTERNAL MEDICINE

## 2024-12-09 PROCEDURE — 91320 SARSCV2 VAC 30MCG TRS-SUC IM: CPT | Mod: HCNC,S$GLB,, | Performed by: INTERNAL MEDICINE

## 2024-12-09 PROCEDURE — 1160F RVW MEDS BY RX/DR IN RCRD: CPT | Mod: HCNC,CPTII,S$GLB, | Performed by: INTERNAL MEDICINE

## 2024-12-09 PROCEDURE — 1100F PTFALLS ASSESS-DOCD GE2>/YR: CPT | Mod: HCNC,CPTII,S$GLB, | Performed by: INTERNAL MEDICINE

## 2024-12-09 PROCEDURE — 90480 ADMN SARSCOV2 VAC 1/ONLY CMP: CPT | Mod: HCNC,S$GLB,, | Performed by: INTERNAL MEDICINE

## 2024-12-09 PROCEDURE — 3074F SYST BP LT 130 MM HG: CPT | Mod: HCNC,CPTII,S$GLB, | Performed by: INTERNAL MEDICINE

## 2024-12-09 PROCEDURE — 3288F FALL RISK ASSESSMENT DOCD: CPT | Mod: HCNC,CPTII,S$GLB, | Performed by: INTERNAL MEDICINE

## 2024-12-09 RX ORDER — ALENDRONATE SODIUM 70 MG/1
70 TABLET ORAL
Qty: 12 TABLET | Refills: 3 | Status: SHIPPED | OUTPATIENT
Start: 2024-12-09

## 2024-12-09 NOTE — PROGRESS NOTES
Patient tolerated influenza  injection. Advised to wait 15mins. VIS information received. Left deltoid

## 2024-12-10 ENCOUNTER — LAB VISIT (OUTPATIENT)
Dept: LAB | Facility: HOSPITAL | Age: 79
End: 2024-12-10
Attending: INTERNAL MEDICINE
Payer: MEDICARE

## 2024-12-10 ENCOUNTER — PATIENT MESSAGE (OUTPATIENT)
Dept: FAMILY MEDICINE | Facility: CLINIC | Age: 79
End: 2024-12-10
Payer: MEDICARE

## 2024-12-10 DIAGNOSIS — E78.5 BORDERLINE HYPERLIPIDEMIA: ICD-10-CM

## 2024-12-10 LAB
ALBUMIN SERPL BCP-MCNC: 3.8 G/DL (ref 3.5–5.2)
ALP SERPL-CCNC: 64 U/L (ref 40–150)
ALT SERPL W/O P-5'-P-CCNC: 13 U/L (ref 10–44)
AST SERPL-CCNC: 21 U/L (ref 10–40)
BILIRUB DIRECT SERPL-MCNC: 0.4 MG/DL (ref 0.1–0.3)
BILIRUB SERPL-MCNC: 1.1 MG/DL (ref 0.1–1)
CHOLEST SERPL-MCNC: 125 MG/DL (ref 120–199)
CHOLEST/HDLC SERPL: 2.6 {RATIO} (ref 2–5)
HDLC SERPL-MCNC: 49 MG/DL (ref 40–75)
HDLC SERPL: 39.2 % (ref 20–50)
LDLC SERPL CALC-MCNC: 58.6 MG/DL (ref 63–159)
NONHDLC SERPL-MCNC: 76 MG/DL
PROT SERPL-MCNC: 7 G/DL (ref 6–8.4)
TRIGL SERPL-MCNC: 87 MG/DL (ref 30–150)

## 2024-12-10 PROCEDURE — 80061 LIPID PANEL: CPT | Mod: HCNC | Performed by: INTERNAL MEDICINE

## 2024-12-10 PROCEDURE — 36415 COLL VENOUS BLD VENIPUNCTURE: CPT | Mod: HCNC,PO | Performed by: INTERNAL MEDICINE

## 2024-12-10 PROCEDURE — 80076 HEPATIC FUNCTION PANEL: CPT | Mod: HCNC | Performed by: INTERNAL MEDICINE

## 2024-12-10 NOTE — PROGRESS NOTES
CHIEF COMPLAINT:   Chief Complaint   Patient presents with    Annual Exam    Urinary Tract Infection          HISTORY OF PRESENT ILLNESS:  Gale Fernández is a 79 y.o. female who presents to the clinic today for a routine physical exam. Her last physical exam was approximately 1 years(s) ago.          Patient reports persistent urinary symptoms, including urgency and occasional incontinence, particularly when exposed to running water. She has consulted with Dr. Bray, a specialist, multiple times regarding these issues. Patient has a history of UTIs, though during her most recent visit with Dr. Bray, she did not have a UTI.    Dr. Bray prescribed topical estrogen, which the patient began using approximately 1 month ago. She wears incontinence pads continuously due to her urinary symptoms.    Patient reports intermittent left leg instability, which she attributes to a previous knee surgery. She also mentions a past fall resulting in a fractured rib, for which she was prescribed hydrocodone. Patient did not take the medication and still has it in her possession.    Patient takes gabapentin 100mg at bedtime, which helps with sleep and appears to alleviate some urinary symptoms.     She reports that she did not start the previously prescribed Fosamax for her osteoporosis.  She is currently taking calcium supplementation.         Subjective    PAST MEDICAL HISTORY:  Past Medical History:   Diagnosis Date    Atrial fibrillation     Followed by EP cardiology    Borderline hyperlipidemia     Cataract     History of abnormal Pap smear     More than 20 years ago; status post colposcopy    History of anxiety disorder     History of hypertension     Currently doing okay off medication    Hypertension     Osteopenia     Refuses medication    Overweight(278.02)     Stroke 2019       PAST SURGICAL HISTORY:  Past Surgical History:   Procedure Laterality Date     SECTION, LOW TRANSVERSE      X1    COLONOSCOPY N/A  8/21/2020    Procedure: COLONOSCOPY;  Surgeon: Maicol Johnson MD;  Location: [facility] ([address] [place] [address]);  Service: Endoscopy;  Laterality: N/A;  Covid test on 8/18. Pt. holding Xarelto for 2 days prior to.EC    left knee meniscus surgery  2012    neck tuck and liposuction  2012    PSVT ablation      RADIOFREQUENCY ABLATION  2004    Skin cancer removal on the left eye         SOCIAL HISTORY:  Social History     Socioeconomic History    Marital status:     Number of children: 1   Occupational History    Occupation: Teacher   Tobacco Use    Smoking status: Former     Types: Cigarettes    Smokeless tobacco: Never   Substance and Sexual Activity    Alcohol use: Yes     Comment: Rarely    Drug use: No     Social Drivers of Health     Financial Resource Strain: Low Risk  (1/21/2024)    Overall Financial Resource Strain (CARDIA)     Difficulty of Paying Living Expenses: Not very hard   Food Insecurity: No Food Insecurity (1/21/2024)    Hunger Vital Sign     Worried About Running Out of Food in the Last Year: Never true     Ran Out of Food in the Last Year: Never true   Transportation Needs: No Transportation Needs (1/21/2024)    PRAPARE - Transportation     Lack of Transportation (Medical): No     Lack of Transportation (Non-Medical): No   Physical Activity: Unknown (1/21/2024)    Exercise Vital Sign     Minutes of Exercise per Session: 0 min   Stress: Stress Concern Present (1/21/2024)    [employer] [place] [employer] - Occupational Stress Questionnaire     Feeling of Stress : To some extent   Housing Stability: Low Risk  (1/21/2024)    Housing Stability Vital Sign     Unable to Pay for Housing in the Last Year: No     Number of Places Lived in the Last Year: 1     Unstable Housing in the Last Year: No       FAMILY HISTORY:  Family History   Problem Relation Name Age of Onset    Heart disease Mother      Cancer Father          nasopharynx    Breast cancer Cousin      Cancer Cousin          colon cancer     Hypertension Sister Yessi     Diabetes Sister Yessi     Cancer Sister Yessi     Ovarian cancer Sister Yessi     Coronary artery disease Brother Valery         s/p stenting    Hypertension Brother Valery     Rheum arthritis Sister Bel     Hypertension Brother Aramis     Hypertension Brother Guanakito     Heart disease Brother Guanakito         s/p CABG    Hypertension Brother Dwayne     Coronary artery disease Brother Dwayne         s/p stenting    Cancer Brother Dwayne     No Known Problems Maternal Grandmother      No Known Problems Maternal Grandfather      No Known Problems Paternal Grandmother      No Known Problems Paternal Grandfather      No Known Problems Maternal Aunt      No Known Problems Maternal Uncle      No Known Problems Paternal Aunt      No Known Problems Paternal Uncle      Colon cancer Neg Hx      Amblyopia Neg Hx      Blindness Neg Hx      Cataracts Neg Hx      Glaucoma Neg Hx      Macular degeneration Neg Hx      Retinal detachment Neg Hx      Strabismus Neg Hx      Stroke Neg Hx      Thyroid disease Neg Hx         ALLERGIES AND MEDICATIONS: updated and reviewed.  Review of patient's allergies indicates:  No Known Allergies  Medication List with Changes/Refills   Current Medications    ALPRAZOLAM (XANAX) 0.25 MG TABLET    TAKE 1 TABLET BY MOUTH AT BEDTIME AS NEEDED FOR ANXIETRY    AMLODIPINE (NORVASC) 5 MG TABLET    TAKE 1 TABLET EVERY DAY    ATORVASTATIN (LIPITOR) 20 MG TABLET    TAKE 1 TABLET EVERY DAY    CALCIUM-VITAMIN D3 (CALCIUM 500 + D) 500 MG-5 MCG (200 UNIT) PER TABLET    Take 1 tablet by mouth 2 (two) times daily with meals.    ESTRADIOL (ESTRACE) 0.01 % (0.1 MG/GRAM) VAGINAL CREAM    Place 1 g vaginally twice a week.    FLUTICASONE PROPIONATE (FLONASE) 50 MCG/ACTUATION NASAL SPRAY    1 spray (50 mcg total) by Each Nostril route daily as needed for Rhinitis.    FUROSEMIDE (LASIX) 20 MG TABLET    Take 1 tablet (20 mg total) by mouth 2 (two) times daily as needed (Leg Swelling).     GABAPENTIN (NEURONTIN) 100 MG CAPSULE    Take 1 capsule (100 mg total) by mouth 3 (three) times daily.    RIVAROXABAN (XARELTO) 20 MG TAB    Take 1 tablet (20 mg total) by mouth every evening.   Changed and/or Refilled Medications    Modified Medication Previous Medication    ALENDRONATE (FOSAMAX) 70 MG TABLET alendronate (FOSAMAX) 70 MG tablet       Take 1 tablet (70 mg total) by mouth every 7 days.    Take 1 tablet (70 mg total) by mouth every 7 days.   Discontinued Medications    HYDROCODONE-ACETAMINOPHEN (NORCO) 5-325 MG PER TABLET    Take 1 tablet by mouth every 6 (six) hours as needed for Pain.          CARE TEAM:  Patient Care Team:  Vickie Hurtado MD as PCP - General (Internal Medicine)  Helga Mcmullen LPN as Licensed Practical Nurse  Jose Eduardo Stanley MD as Consulting Physician (Electrophysiology)  India Dumont, PharmLIONEL as Hyperlipidemia Digital Medicine Clinician  Vickie Hurtado MD as Hyperlipidemia Digital Medicine Responsible Provider (Internal Medicine)  India Dumont PharmD as Hypertension Digital Medicine Clinician (Pharmacist)  Vickie Hurtado MD as Hypertension Digital Medicine Responsible Provider (Internal Medicine)  [facility] as Hypertension Digital Medicine Contract       SCREENING HISTORY:  Health Maintenance         Date Due Completion Date    Hemoglobin A1c (Prediabetes) 05/10/2025 5/10/2024    Lipid Panel 05/10/2025 5/10/2024    DEXA Scan 10/16/2025 10/16/2023    Override on 7/7/2011: Done    TETANUS VACCINE 06/06/2026 6/6/2016    Colonoscopy 08/21/2030 8/21/2020    Override on 3/10/2015: Declined              REVIEW OF SYSTEMS:   The patient reports : fair dietary habits.  The patient reports  : that they do not exercise regularly  Review of Systems   Constitutional:  Negative for chills and fever.   Respiratory:  Negative for cough and shortness of breath.    Cardiovascular:  Negative for chest pain.   Gastrointestinal:  Negative for abdominal pain.  "  Genitourinary:  Positive for bladder incontinence and urgency.   Musculoskeletal:  Positive for arthralgias.       ROS (Optional)-: no pelvic pain  Breast ROS (Optional)-: negative for breast lumps/discharge          Objective    PHYSICAL EXAMINATION/VITALS:  Vitals:    12/09/24 1457   BP: 128/68   BP Location: Left arm   Patient Position: Sitting   Pulse: 82   Temp: 98.9 °F (37.2 °C)   TempSrc: Oral   SpO2: 97%   Weight: 80.7 kg (177 lb 12.8 oz)   Height: 5' 5" (1.651 m)        Body mass index is 29.59 kg/m².      General appearance - alert, well appearing, and in no distress, overweight  Psychiatric - alert, oriented to person, place, and time, normal behavior, speech, dress, motor activity and thought process  Eyes - pupils equal and reactive, extraocular eye movements intact, sclera anicteric  Neck - supple, no significant adenopathy, carotids upstroke normal bilaterally, no bruits  Lymphatics - no palpable cervical lymphadenopathy  Chest - clear to auscultation, no wheezes, rales or rhonchi, symmetric air entry  Heart - irregularly irregular rhythm with rate controlled   Neurological - alert, normal speech, no focal findings; cranial nerves II through XII intact  Musculoskeletal - not examined  Extremities - no pedal edema noted  Skin - normal coloration, no suspicious skin lesions      LABS:  Ordered/Scheduled            ASSESSMENT AND PLAN:   1. Routine medical exam  Counseled on age appropriate medical preventative services including age appropriate cancer screenings, age appropriate eye and dental exams, over all nutritional health, need for a consistent exercise regimen, and an over all push towards maintaining a vigorous and active lifestyle.  Counseled on age appropriate vaccines and discussed upcoming health care needs based on age/gender. Discussed good sleep hygiene and stress management.    2. Essential hypertension  BP Readings from Last 1 Encounters:   12/09/24 128/68      The current medical " regimen is effective;  continue present plan and medications. Recommended patient to check home readings to monitor and see me for followup as scheduled or sooner as needed.   Discussed sodium restriction, maintaining ideal body weight and regular exercise program as physiologic means to continue to achieve blood pressure control in addition to medication compliance.  Patient was educated that both decongestant and anti-inflammatory medication may raise blood pressure.  The patient is active on the digital hypertension program.    3. Permanent atrial fibrillation  The current medical regimen is effective;  continue present plan and medications.   Followed by: Cardiology.     4. Borderline hyperlipidemia  Lab Results   Component Value Date    CHOL 135 05/10/2024     Lab Results   Component Value Date    HDL 45 05/10/2024     Lab Results   Component Value Date    LDLCALC 72.6 05/10/2024     Lab Results   Component Value Date    TRIG 87 05/10/2024     Lab Results   Component Value Date    LDLCALC 72.6 05/10/2024     We discussed low fat diet and regular exercise.  Cardiology increased her atorvastatin to 40 mg daily at last office visit.  I will check fasting lipid profile and LFTs for follow-up.    -     Lipid Panel; Future; Expected date: 12/09/2024  -     Hepatic Function Panel; Future; Expected date: 12/09/2024    5. Prediabetes  Lab Results   Component Value Date    HGBA1C 5.6 05/10/2024     Stable. We discussed low sugar/low carbohydrate diet and regular exercise to prevent progression. No need for prescription medication at this time.  Check A1c yearly.    6. Osteopenia after menopause  We discussed adequate calcium intake (preferably from diet) and vitamin D supplementation. We discussed fall precautions. She is up to date on her BMD.  I discussed with the patient that I will prefer her to get her calcium from diet, not from supplements.  We also discussed the mechanism of action and risks/benefits of Fosamax.   She will start taking it.  Consider changing to Reclast if she has trouble tolerating the Fosamax or has difficulty remembering to take it weekly.  Overview:  (Declined medication in the past).  6/2019 - No need for Rx tx at this time.  10/21 - No need for Rx tx at this time.  10/23 - BMD recommends Rx tx for high fracture risk          7. Obesity (BMI 30-39.9)  BMI Readings from Last 3 Encounters:   12/09/24 29.59 kg/m²   09/26/24 28.93 kg/m²   08/24/24 28.96 kg/m²     The patient is asked to make an attempt to improve diet and exercise patterns to aid in medical management of this problem.    8. History of stroke  Stable/asymptomatic.  Continue risk factor reduction.  Overview:  - 9/2019 - right middle cerebral artery        9. Needs flu shot    -     influenza (adjuvanted) (Fluad) 45 mcg/0.5 mL IM vaccine (> or = 64 yo) 0.5 mL    10. Need for COVID-19 vaccine    -     COVID-19 (Pfizer) 30 mcg/0.3 mL IM vaccine (>/= 13 yo) 0.3 mL      11. Genitourinary syndrome of menopause  I discussed with the patient that it may take several months to see some improvement from her topical estrogen use.  I have previously in the past discussed with her pelvic floor therapy.  I will refer her again at this time to see if that will help with her other symptoms.  -     Ambulatory referral/consult to Physical/Occupational Therapy; Future; Expected date: 12/16/2024    12. Insomnia, unspecified type  She takes gabapentin 100 mg at bedtime.  This is working well for her.  Continue.               PATIENT EDUCATION:  Explained bone density changes over time and factors affecting bone health  Discussed mechanism of action for Fosamax in rebuilding bone  Advised on potential bladder irritants contributing to urinary frequency    ACTION ITEMS/LIFESTYLE:  Patient to work on strengthening leg muscles to prevent falls          Orders Placed This Encounter   Procedures    Lipid Panel    Hepatic Function Panel    Ambulatory referral/consult to  Physical/Occupational Therapy      FOLLOW UP: Follow up in about 1 year (around 12/9/2025), or if symptoms worsen or fail to improve, for annual exam. or sooner as needed.    This note was generated with the assistance of ambient listening technology. Verbal consent was obtained by the patient and accompanying visitor(s) for the recording of patient appointment to facilitate this note. I attest to having reviewed and edited the generated note for accuracy, though some syntax or spelling errors may persist. Please contact the author of this note for any clarification.

## 2025-01-02 DIAGNOSIS — F41.9 ANXIETY: ICD-10-CM

## 2025-01-02 RX ORDER — ALPRAZOLAM 0.25 MG/1
TABLET ORAL
Qty: 30 TABLET | Refills: 0 | Status: SHIPPED | OUTPATIENT
Start: 2025-01-02

## 2025-01-02 NOTE — TELEPHONE ENCOUNTER
No care due was identified.  Health [facility] Embedded Care Due Messages. Reference number: [id number].   1/02/2025 6:29:49 AM CST

## 2025-01-16 ENCOUNTER — TELEPHONE (OUTPATIENT)
Dept: OPHTHALMOLOGY | Facility: CLINIC | Age: 80
End: 2025-01-16
Payer: MEDICARE

## 2025-01-16 NOTE — TELEPHONE ENCOUNTER
----- Message from Ajit sent at 1/16/2025  9:32 AM CST -----  Regarding: self    Type: Patient Call Back    Who called:self    What is the request in detail: Patient is requesting an appt for Dr Ayala for an eye exam before cataract surgery. Attempted to scheduled but need an overrule.     Can the clinic reply by [facility]? No    Would the patient rather a call back or a response via [facility]? Call back    Best call back number:[phone]      Additional Information:    Thank you.

## 2025-02-20 DIAGNOSIS — I48.19 PERSISTENT ATRIAL FIBRILLATION: ICD-10-CM

## 2025-02-22 DIAGNOSIS — Z00.00 ENCOUNTER FOR MEDICARE ANNUAL WELLNESS EXAM: ICD-10-CM

## 2025-03-13 ENCOUNTER — TELEPHONE (OUTPATIENT)
Dept: OPHTHALMOLOGY | Facility: CLINIC | Age: 80
End: 2025-03-13
Payer: MEDICARE

## 2025-03-13 NOTE — TELEPHONE ENCOUNTER
----- Message from Sandi sent at 3/13/2025  1:39 PM CDT -----  Regarding: Appt  Contact: Pt  [phone]  Name of Caller: Gale  Contact Preference: [phone] Nature of Call:  Requesting a call back would like to know if she can get seen today pt is in the lobby

## 2025-03-14 ENCOUNTER — OFFICE VISIT (OUTPATIENT)
Dept: OPHTHALMOLOGY | Facility: CLINIC | Age: 80
End: 2025-03-14
Payer: MEDICARE

## 2025-03-14 DIAGNOSIS — H26.9 CORTICAL CATARACT OF BOTH EYES: ICD-10-CM

## 2025-03-14 DIAGNOSIS — H25.13 NUCLEAR SCLEROSIS OF BOTH EYES: Primary | ICD-10-CM

## 2025-03-14 DIAGNOSIS — H52.7 REFRACTIVE ERROR: ICD-10-CM

## 2025-03-14 DIAGNOSIS — H43.813 VITREOUS DETACHMENT OF BOTH EYES: ICD-10-CM

## 2025-03-14 PROCEDURE — 99999 PR PBB SHADOW E&M-EST. PATIENT-LVL I: CPT | Mod: PBBFAC,HCNC,, | Performed by: OPHTHALMOLOGY

## 2025-03-14 NOTE — PROGRESS NOTES
Subjective:       Patient ID: Gale Fernández is a 79 y.o. female.    Chief Complaint: Cataract    HPI    Here for cataract evaluation     Eye meds:    Year old female states she has blurry vision with current glasses. States   she struggles to see at night. Pt has been out on contacts for 2 weeks.   Denies ocular pain   7  Last edited by Suni Rubio on 3/14/2025  3:01 PM.             Assessment:       1. Nuclear sclerosis of both eyes    2. Cortical cataract of both eyes    3. Vitreous detachment of both eyes    4. Refractive error        Plan:       Visually significant cataract OU -Pt. Wants Sx.    PVD's OU-Stable.  RE        CE OD 1st,       OS 2nd.

## 2025-03-21 ENCOUNTER — TELEPHONE (OUTPATIENT)
Dept: OPHTHALMOLOGY | Facility: CLINIC | Age: 80
End: 2025-03-21
Payer: MEDICARE

## 2025-05-27 ENCOUNTER — TELEPHONE (OUTPATIENT)
Dept: OPHTHALMOLOGY | Facility: CLINIC | Age: 80
End: 2025-05-27
Payer: MEDICARE

## 2025-05-29 ENCOUNTER — TELEPHONE (OUTPATIENT)
Dept: OPHTHALMOLOGY | Facility: CLINIC | Age: 80
End: 2025-05-29
Payer: MEDICARE

## 2025-06-05 ENCOUNTER — TELEPHONE (OUTPATIENT)
Dept: OPHTHALMOLOGY | Facility: CLINIC | Age: 80
End: 2025-06-05
Payer: MEDICARE

## 2025-06-06 ENCOUNTER — TELEPHONE (OUTPATIENT)
Dept: OPHTHALMOLOGY | Facility: CLINIC | Age: 80
End: 2025-06-06
Payer: MEDICARE

## 2025-06-06 ENCOUNTER — PATIENT MESSAGE (OUTPATIENT)
Dept: OPHTHALMOLOGY | Facility: CLINIC | Age: 80
End: 2025-06-06
Payer: MEDICARE

## 2025-06-06 DIAGNOSIS — H25.11 NS (NUCLEAR SCLEROSIS), RIGHT: Primary | ICD-10-CM

## 2025-06-09 ENCOUNTER — TELEPHONE (OUTPATIENT)
Dept: OPHTHALMOLOGY | Facility: CLINIC | Age: 80
End: 2025-06-09
Payer: MEDICARE

## 2025-06-09 DIAGNOSIS — H25.12 NS (NUCLEAR SCLEROSIS), LEFT: Primary | ICD-10-CM

## 2025-06-12 ENCOUNTER — PATIENT MESSAGE (OUTPATIENT)
Dept: ADMINISTRATIVE | Facility: CLINIC | Age: 80
End: 2025-06-12
Payer: MEDICARE

## 2025-06-17 ENCOUNTER — TELEPHONE (OUTPATIENT)
Dept: OPHTHALMOLOGY | Facility: CLINIC | Age: 80
End: 2025-06-17
Payer: MEDICARE

## 2025-06-17 NOTE — TELEPHONE ENCOUNTER
----- Message from Roberto Culp sent at 6/16/2025  2:19 PM CDT -----  Pt says you ordered her contacts? Wants you to reach out

## 2025-06-24 ENCOUNTER — PATIENT MESSAGE (OUTPATIENT)
Dept: CARDIOLOGY | Facility: CLINIC | Age: 80
End: 2025-06-24
Payer: MEDICARE

## 2025-06-24 DIAGNOSIS — Z86.73 HISTORY OF STROKE: ICD-10-CM

## 2025-06-24 DIAGNOSIS — I48.19 PERSISTENT ATRIAL FIBRILLATION: Primary | ICD-10-CM

## 2025-06-24 DIAGNOSIS — R73.03 PREDIABETES: ICD-10-CM

## 2025-06-24 DIAGNOSIS — E78.5 BORDERLINE HYPERLIPIDEMIA: ICD-10-CM

## 2025-06-24 DIAGNOSIS — I10 ESSENTIAL HYPERTENSION: ICD-10-CM

## 2025-07-07 ENCOUNTER — LAB VISIT (OUTPATIENT)
Dept: LAB | Facility: HOSPITAL | Age: 80
End: 2025-07-07
Attending: INTERNAL MEDICINE
Payer: MEDICARE

## 2025-07-07 DIAGNOSIS — I10 ESSENTIAL HYPERTENSION: ICD-10-CM

## 2025-07-07 DIAGNOSIS — E78.5 BORDERLINE HYPERLIPIDEMIA: ICD-10-CM

## 2025-07-07 DIAGNOSIS — Z86.73 HISTORY OF STROKE: ICD-10-CM

## 2025-07-07 DIAGNOSIS — I48.19 PERSISTENT ATRIAL FIBRILLATION: ICD-10-CM

## 2025-07-07 DIAGNOSIS — R73.03 PREDIABETES: ICD-10-CM

## 2025-07-07 LAB
ABSOLUTE EOSINOPHIL (OHS): 0.24 K/UL
ABSOLUTE MONOCYTE (OHS): 0.56 K/UL (ref 0.3–1)
ABSOLUTE NEUTROPHIL COUNT (OHS): 4.55 K/UL (ref 1.8–7.7)
ALBUMIN SERPL BCP-MCNC: 3.8 G/DL (ref 3.5–5.2)
ALP SERPL-CCNC: 47 UNIT/L (ref 40–150)
ALT SERPL W/O P-5'-P-CCNC: 10 UNIT/L (ref 10–44)
ANION GAP (OHS): 6 MMOL/L (ref 8–16)
AST SERPL-CCNC: 18 UNIT/L (ref 11–45)
BASOPHILS # BLD AUTO: 0.04 K/UL
BASOPHILS NFR BLD AUTO: 0.6 %
BILIRUB SERPL-MCNC: 0.7 MG/DL (ref 0.1–1)
BUN SERPL-MCNC: 22 MG/DL (ref 8–23)
CALCIUM SERPL-MCNC: 9.2 MG/DL (ref 8.7–10.5)
CHLORIDE SERPL-SCNC: 109 MMOL/L (ref 95–110)
CO2 SERPL-SCNC: 25 MMOL/L (ref 23–29)
CREAT SERPL-MCNC: 0.6 MG/DL (ref 0.5–1.4)
ERYTHROCYTE [DISTWIDTH] IN BLOOD BY AUTOMATED COUNT: 12.4 % (ref 11.5–14.5)
GFR SERPLBLD CREATININE-BSD FMLA CKD-EPI: >60 ML/MIN/1.73/M2
GLUCOSE SERPL-MCNC: 84 MG/DL (ref 70–110)
HCT VFR BLD AUTO: 43.2 % (ref 37–48.5)
HGB BLD-MCNC: 13.7 GM/DL (ref 12–16)
IMM GRANULOCYTES # BLD AUTO: 0.01 K/UL (ref 0–0.04)
IMM GRANULOCYTES NFR BLD AUTO: 0.1 % (ref 0–0.5)
LYMPHOCYTES # BLD AUTO: 1.67 K/UL (ref 1–4.8)
MCH RBC QN AUTO: 31.4 PG (ref 27–31)
MCHC RBC AUTO-ENTMCNC: 31.7 G/DL (ref 32–36)
MCV RBC AUTO: 99 FL (ref 82–98)
NUCLEATED RBC (/100WBC) (OHS): 0 /100 WBC
PLATELET # BLD AUTO: 220 K/UL (ref 150–450)
PMV BLD AUTO: 10.2 FL (ref 9.2–12.9)
POTASSIUM SERPL-SCNC: 4.8 MMOL/L (ref 3.5–5.1)
PROT SERPL-MCNC: 6.7 GM/DL (ref 6–8.4)
RBC # BLD AUTO: 4.36 M/UL (ref 4–5.4)
RELATIVE EOSINOPHIL (OHS): 3.4 %
RELATIVE LYMPHOCYTE (OHS): 23.6 % (ref 18–48)
RELATIVE MONOCYTE (OHS): 7.9 % (ref 4–15)
RELATIVE NEUTROPHIL (OHS): 64.4 % (ref 38–73)
SODIUM SERPL-SCNC: 140 MMOL/L (ref 136–145)
WBC # BLD AUTO: 7.07 K/UL (ref 3.9–12.7)

## 2025-07-07 PROCEDURE — 82040 ASSAY OF SERUM ALBUMIN: CPT | Mod: HCNC

## 2025-07-07 PROCEDURE — 36415 COLL VENOUS BLD VENIPUNCTURE: CPT | Mod: HCNC,PO

## 2025-07-07 PROCEDURE — 85025 COMPLETE CBC W/AUTO DIFF WBC: CPT | Mod: HCNC

## 2025-07-08 NOTE — PROGRESS NOTES
Subjective:   Patient ID:  Gale Fernández is a 80 y.o. female who presents for evaluation of Hypertension, Hyperlipidemia, and Follow-up    PROBLEM LIST:  Atrial fibrillation, permanent  H/o TIA 2019  HLD    HPI 7/20/21:  She presents today to establish care.  She follows with Dr. Stanley for now persistent atrial fibrillation.  She was recently taken off of Multaq which she thought caused a rash on her arma. She also has treated hypertension and had a previous TIA.  She overall feels well.  She denies any chest pain, shortness of breath, PND, orthopnea, or heart palpitations.  She was given a prescription for furosemide about 4 weeks ago when she was noted to have some swelling in her left leg and a mildly elevated BNP.  She takes the furosemide about once a week as needed.  She denies any symptoms of sleep apnea.  She walks on a regular basis but is getting ready to start playing tennis and line dancing.      Interval history:  She has been doing well since her last visit.  She denies any chest pain, shortness of breath, PND, orthopnea, or heart palpitations.  She has not had any syncopal events.  She did have 1 fall months ago where she tripped and fell and fractured a rib.  She remains active with her gardening.  She uses her Lasix 1 or 2 times a month.    ECG 5/3/24: Personally reviewed by me shows atrial fibrillation at a rate of 82 beats per minute.      Echo 5/24:  Summary  Show Result Comparison     Left Ventricle: The left ventricle is normal in size. Normal wall thickness. There is normal systolic function with a visually estimated ejection fraction of 55 - 60%. Unable to assess diastolic function due to atrial fibrillation.    Right Ventricle: Mild right ventricular enlargement. Wall thickness is normal. Systolic function is normal.    Biatrial enlargement    Mitral Valve: There is mild regurgitation.    Pulmonary Artery: The estimated pulmonary artery systolic pressure is 32 mmHg.    IVC/SVC: Intermediate  venous pressure at 8 mmHg.    Exercise AGNIESZKA's :    Interpretation Summary    Right resting AGNIESZKA 1.03, is normal.  Left resting AGNIESZKA 0.96, is suggestive of minimal left lower extremity arterial disease.  PVR waveforms are mildly dampened at the low thigh level bilaterally, and ankle level on the left.  Exercise ABIs are normal bilaterally.      Holter :  nclusion    Baseline rhythm was coarse atrial fibrillation with narrow QRS and an average heart rate while in atrial fibrillation of 78 bpm.  There were three episodes with reported symptoms of shortness of breath, leg pain, and back pain. These episodes all corresponded to periods of atrial fibrillation, with one episode of AF with RVR, rate of 106 bpm.  There were occasional PVCs with an overall PVC burden of 0.5%.  The patient remained in atrial fibrillation with periods of atrial flutter for the duration of study. The longest RR while in atrial fibrillation was 2.2 seconds.      Past Medical History:   Diagnosis Date    Atrial fibrillation     Followed by EP cardiology    Borderline hyperlipidemia     Cataract     History of abnormal Pap smear     More than 20 years ago; status post colposcopy    History of anxiety disorder     History of hypertension     Currently doing okay off medication    Hypertension     Osteopenia     Refuses medication    Overweight(278.02)     Stroke 2019       Past Surgical History:   Procedure Laterality Date     SECTION, LOW TRANSVERSE      X1    COLONOSCOPY N/A 2020    Procedure: COLONOSCOPY;  Surgeon: Maicol Johnson MD;  Location: [facility] ([address] [place] [address]);  Service: Endoscopy;  Laterality: N/A;  Covid test on . Pt. holding Xarelto for 2 days prior to.EC    left knee meniscus surgery      neck tuck and liposuction      PSVT ablation      RADIOFREQUENCY ABLATION      Skin cancer removal on the left eye         Social History     Socioeconomic History    Marital status:     Number of children: 1    Occupational History    Occupation: Teacher   Tobacco Use    Smoking status: Former     Types: Cigarettes    Smokeless tobacco: Never   Substance and Sexual Activity    Alcohol use: Yes     Comment: Rarely    Drug use: No     Social Drivers of Health     Financial Resource Strain: Low Risk  (1/21/2024)    Overall Financial Resource Strain (CARDIA)     Difficulty of Paying Living Expenses: Not very hard   Food Insecurity: No Food Insecurity (1/21/2024)    Hunger Vital Sign     Worried About Running Out of Food in the Last Year: Never true     Ran Out of Food in the Last Year: Never true   Transportation Needs: No Transportation Needs (1/21/2024)    PRAPARE - Transportation     Lack of Transportation (Medical): No     Lack of Transportation (Non-Medical): No   Physical Activity: Unknown (1/21/2024)    Exercise Vital Sign     Minutes of Exercise per Session: 0 min   Stress: Stress Concern Present (1/21/2024)    [employer] - Occupational Stress Questionnaire     Feeling of Stress : To some extent   Housing Stability: Low Risk  (1/21/2024)    Housing Stability Vital Sign     Unable to Pay for Housing in the Last Year: No     Number of Places Lived in the Last Year: 1     Unstable Housing in the Last Year: No       Family History   Problem Relation Name Age of Onset    Heart disease Mother      Cancer Father          nasopharynx    Breast cancer Cousin      Cancer Cousin          colon cancer    Hypertension Sister Yessi     Diabetes Sister Yessi     Cancer Sister Yessi     Ovarian cancer Sister Yessi     Coronary artery disease Brother Valery         s/p stenting    Hypertension Brother Valery     Rheum arthritis Sister Bel     Hypertension Brother Aramis     Hypertension Brother Guanakito     Heart disease Brother Guanakito         s/p CABG    Hypertension Brother Dwayne     Coronary artery disease Brother Dwayne         s/p stenting    Cancer Brother Dwayne     No Known Problems Maternal  Grandmother      No Known Problems Maternal Grandfather      No Known Problems Paternal Grandmother      No Known Problems Paternal Grandfather      No Known Problems Maternal Aunt      No Known Problems Maternal Uncle      No Known Problems Paternal Aunt      No Known Problems Paternal Uncle      Colon cancer Neg Hx      Amblyopia Neg Hx      Blindness Neg Hx      Cataracts Neg Hx      Glaucoma Neg Hx      Macular degeneration Neg Hx      Retinal detachment Neg Hx      Strabismus Neg Hx      Stroke Neg Hx      Thyroid disease Neg Hx         Patient's Medications   New Prescriptions    No medications on file   Previous Medications    ALENDRONATE (FOSAMAX) 70 MG TABLET    Take 1 tablet (70 mg total) by mouth every 7 days.    ALPRAZOLAM (XANAX) 0.25 MG TABLET    TAKE 1 TABLET BY MOUTH AT BEDTIME AS NEEDED FOR ANXIETRY    AMLODIPINE (NORVASC) 5 MG TABLET    TAKE 1 TABLET EVERY DAY    ATORVASTATIN (LIPITOR) 20 MG TABLET    TAKE 1 TABLET EVERY DAY    CALCIUM-VITAMIN D3 (CALCIUM 500 + D) 500 MG-5 MCG (200 UNIT) PER TABLET    Take 1 tablet by mouth 2 (two) times daily with meals.    ESTRADIOL (ESTRACE) 0.01 % (0.1 MG/GRAM) VAGINAL CREAM    Place 1 g vaginally twice a week.    FLUTICASONE PROPIONATE (FLONASE) 50 MCG/ACTUATION NASAL SPRAY    1 spray (50 mcg total) by Each Nostril route daily as needed for Rhinitis.    FUROSEMIDE (LASIX) 20 MG TABLET    Take 1 tablet (20 mg total) by mouth 2 (two) times daily as needed (Leg Swelling).    GABAPENTIN (NEURONTIN) 100 MG CAPSULE    Take 1 capsule (100 mg total) by mouth 3 (three) times daily.    RIVAROXABAN (XARELTO) 20 MG TAB    Take 1 tablet (20 mg total) by mouth every evening.   Modified Medications    No medications on file   Discontinued Medications    No medications on file       Review of Systems   Constitutional: Negative for malaise/fatigue and weight gain.   HENT:  Negative for hearing loss.    Eyes:  Negative for visual disturbance.   Cardiovascular:  Negative for  "chest pain, claudication, dyspnea on exertion, leg swelling, near-syncope, orthopnea, palpitations, paroxysmal nocturnal dyspnea and syncope.   Respiratory:  Negative for cough, shortness of breath, sleep disturbances due to breathing, snoring and wheezing.    Endocrine: Negative for cold intolerance, heat intolerance, polydipsia, polyphagia and polyuria.   Hematologic/Lymphatic: Negative for bleeding problem. Does not bruise/bleed easily.   Skin:  Negative for rash and suspicious lesions.   Musculoskeletal:  Negative for falls, muscle weakness and myalgias.   Gastrointestinal:  Negative for abdominal pain, change in bowel habit, constipation, diarrhea, heartburn, hematochezia, melena and nausea.   Genitourinary:  Negative for hematuria and nocturia.   Neurological:  Negative for excessive daytime sleepiness, dizziness, headaches, light-headedness, loss of balance and weakness.   Psychiatric/Behavioral:  Negative for depression. The patient is not nervous/anxious.    Allergic/Immunologic: Negative for environmental allergies.       /84 (BP Location: Left arm, Patient Position: Sitting)   Pulse 76   Ht 5' 5" (1.651 m)   Wt 77.4 kg (170 lb 10.2 oz)   LMP 07/24/2012   SpO2 99%   BMI 28.40 kg/m²     Objective:   Physical Exam  Constitutional:       Appearance: She is well-developed.      Comments:      HENT:      Head: Normocephalic and atraumatic.   Eyes:      General: No scleral icterus.     Conjunctiva/sclera: Conjunctivae normal.      Pupils: Pupils are equal, round, and reactive to light.   Neck:      Thyroid: No thyromegaly.      Vascular: No hepatojugular reflux or JVD.      Trachea: No tracheal deviation.   Cardiovascular:      Rate and Rhythm: Normal rate. Rhythm irregularly irregular.      Chest Wall: PMI is not displaced.      Pulses: Intact distal pulses.           Carotid pulses are 2+ on the right side and 2+ on the left side.       Radial pulses are 2+ on the right side and 2+ on the left " side.        Dorsalis pedis pulses are 2+ on the right side and 2+ on the left side.        Posterior tibial pulses are 2+ on the right side and 2+ on the left side.      Heart sounds: Normal heart sounds.   Pulmonary:      Effort: Pulmonary effort is normal.      Breath sounds: Normal breath sounds.   Abdominal:      General: Bowel sounds are normal. There is no distension.      Palpations: Abdomen is soft. There is no mass.      Tenderness: There is no abdominal tenderness.   Musculoskeletal:         General: No tenderness.      Cervical back: Normal range of motion and neck supple.   Lymphadenopathy:      Cervical: No cervical adenopathy.   Skin:     General: Skin is warm and dry.      Findings: No erythema or rash.      Nails: There is no clubbing.   Neurological:      Mental Status: She is alert and oriented to person, place, and time.   Psychiatric:         Speech: Speech normal.         Behavior: Behavior normal.         Lab Results   Component Value Date     07/07/2025     05/10/2024    K 4.8 07/07/2025    K 4.8 05/10/2024     07/07/2025     05/10/2024    CO2 25 07/07/2025    CO2 28 05/10/2024    BUN 22 07/07/2025    CREATININE 0.6 07/07/2025    GLU 84 07/07/2025    GLU 99 05/10/2024    HGBA1C 5.6 05/10/2024    MG 1.9 09/18/2019    AST 18 07/07/2025    AST 21 12/10/2024    ALT 10 07/07/2025    ALT 13 12/10/2024    ALBUMIN 3.8 07/07/2025    ALBUMIN 3.8 12/10/2024    PROT 6.7 07/07/2025    PROT 7.0 12/10/2024    BILITOT 0.7 07/07/2025    BILITOT 1.1 (H) 12/10/2024    WBC 7.07 07/07/2025    HGB 13.7 07/07/2025    HGB 14.4 05/10/2024    HCT 43.2 07/07/2025    HCT 46.5 05/10/2024    MCV 99 (H) 07/07/2025     (H) 05/10/2024     07/07/2025     05/10/2024    INR 1.3 (H) 03/12/2020    TSH 1.382 05/10/2024    CHOL 125 12/10/2024    HDL 49 12/10/2024    LDLCALC 58.6 (L) 12/10/2024    TRIG 87 12/10/2024     (H) 06/30/2021       Assessment:     1. History of TIA  (transient ischemic attack) : Continue xarelto and atorvastatin.   2. Permanent atrial fibrillation : [id number] is 5. She is anticoagulated with Xarelto.  Her rate is controlled without any juanita blocking agents. She is asymptomatic with preserved LVEF. Follow up echo ordered. Last seen by Dr. Stanley in 2021 and rate control strategy was decided.   3. Essential hypertension : Blood pressure is at goal. I have made no changes. Continue current regimen. She is actively enrolled in the digital hypertension program with a mean BP of 115/70 and at goal 92% of the time.       Plan:     Gale was seen today for hypertension, hyperlipidemia and follow-up.    Diagnoses and all orders for this visit:    History of stroke    Permanent atrial fibrillation  -     Echo; Future  -     Lipid Panel; Future  -     Comprehensive Metabolic Panel; Future  -     CBC Auto Differential; Future    Borderline hyperlipidemia  -     Lipid Panel; Future  -     Comprehensive Metabolic Panel; Future  -     CBC Auto Differential; Future    Prediabetes        Thank you for allowing me to participate in this patient's care. Please do not hesitate to contact me with any questions or concerns.

## 2025-07-09 ENCOUNTER — OFFICE VISIT (OUTPATIENT)
Dept: CARDIOLOGY | Facility: CLINIC | Age: 80
End: 2025-07-09
Payer: MEDICARE

## 2025-07-09 VITALS
SYSTOLIC BLOOD PRESSURE: 138 MMHG | HEART RATE: 76 BPM | BODY MASS INDEX: 28.43 KG/M2 | OXYGEN SATURATION: 99 % | WEIGHT: 170.63 LBS | DIASTOLIC BLOOD PRESSURE: 84 MMHG | HEIGHT: 65 IN

## 2025-07-09 DIAGNOSIS — H25.13 NUCLEAR SCLEROSIS OF BOTH EYES: Primary | ICD-10-CM

## 2025-07-09 DIAGNOSIS — E78.5 BORDERLINE HYPERLIPIDEMIA: ICD-10-CM

## 2025-07-09 DIAGNOSIS — Z86.73 HISTORY OF STROKE: Primary | ICD-10-CM

## 2025-07-09 DIAGNOSIS — R73.03 PREDIABETES: ICD-10-CM

## 2025-07-09 DIAGNOSIS — I48.21 PERMANENT ATRIAL FIBRILLATION: ICD-10-CM

## 2025-07-09 PROCEDURE — 99999 PR PBB SHADOW E&M-EST. PATIENT-LVL IV: CPT | Mod: PBBFAC,,, | Performed by: INTERNAL MEDICINE

## 2025-07-09 RX ORDER — PREDNISOLONE/MOXIFLOX/BROMFEN 1 %-0.5 %
1 SUSPENSION, DROPS(FINAL DOSAGE FORM)(ML) OPHTHALMIC (EYE) 3 TIMES DAILY
Qty: 8 ML | Refills: 2 | Status: SHIPPED | OUTPATIENT
Start: 2025-08-04

## 2025-07-28 ENCOUNTER — HOSPITAL ENCOUNTER (OUTPATIENT)
Dept: PREADMISSION TESTING | Facility: HOSPITAL | Age: 80
Discharge: HOME | End: 2025-07-28
Attending: OPHTHALMOLOGY
Payer: MEDICARE

## 2025-07-28 NOTE — PRE-PROCEDURE INSTRUCTIONS
Pre-operative instructions, medication directives and pain scales reviewed with patient. All questions the patient had were answered. Re-assurance about surgical procedure and day of surgery routine given as needed, patient verbalized understanding of the pre-op instructions.  Patient instructed to report to [facility] for surgery.    Phone preop done.  Arrival time 700 am

## 2025-07-30 ENCOUNTER — TELEPHONE (OUTPATIENT)
Dept: OPHTHALMOLOGY | Facility: CLINIC | Age: 80
End: 2025-07-30
Payer: MEDICARE

## 2025-08-02 NOTE — H&P
[facility]  History & Physical    Subjective:      Chief Complaint/Reason for Admission:     Gale Fernández is a 80 y.o. female.    Past Medical History:   Diagnosis Date    Atrial fibrillation     Followed by EP cardiology    Borderline hyperlipidemia     Cataract     History of abnormal Pap smear     More than 20 years ago; status post colposcopy    History of anxiety disorder     History of hypertension     Currently doing okay off medication    Hypertension     Osteopenia     Refuses medication    Overweight(278.02)     Stroke 2019     Past Surgical History:   Procedure Laterality Date     SECTION, LOW TRANSVERSE      X1    COLONOSCOPY N/A 2020    Procedure: COLONOSCOPY;  Surgeon: Maicol Johnson MD;  [facility] ([address] [place] [address]);  Service: Endoscopy;  Laterality: N/A;  Covid test on . Pt. holding Xarelto for 2 days prior to.EC    left knee meniscus surgery      neck tuck and liposuction      PSVT ablation      RADIOFREQUENCY ABLATION      Skin cancer removal on the left eye       Social History[1]    No medications prior to admission.     Review of patient's allergies indicates:  No Known Allergies     Review of Systems   Eyes:  Positive for blurred vision.   All other systems reviewed and are negative.        Objective:      Vital Signs (Most Recent)       Vital Signs Range (Last 24H):       Physical Exam  Constitutional:       Appearance: She is well-developed.   HENT:      Head: Normocephalic.   Eyes:      Conjunctiva/sclera: Conjunctivae normal.      Pupils: Pupils are equal, round, and reactive to light.   Cardiovascular:      Rate and Rhythm: Normal rate and regular rhythm.      Heart sounds: Normal heart sounds.   Pulmonary:      Effort: Pulmonary effort is normal.      Breath sounds: Normal breath sounds.   Abdominal:      General: Bowel sounds are normal.      Palpations: Abdomen is soft.   Musculoskeletal:         General: Normal range of  motion.      Cervical back: Normal range of motion and neck supple.   Skin:     General: Skin is warm.   Neurological:      Mental Status: She is alert and oriented to person, place, and time.         ASA Score: II    Mallampati Score: II    Plan for Sedation: Moderate    Patient or Family History of Anesthesia problems : No    Any changes affecting the anesthesia assessment which may have changed since the initial assessment and H and P:  No       Data Review:    ECG:     Assessment:      Cataract OD.    Plan:    CE OD.         [1]   Social History  Tobacco Use    Smoking status: Former     Types: Cigarettes    Smokeless tobacco: Never   Substance Use Topics    Alcohol use: Yes     Comment: Rarely    Drug use: No

## 2025-08-04 ENCOUNTER — PATIENT MESSAGE (OUTPATIENT)
Dept: ADMINISTRATIVE | Facility: HOSPITAL | Age: 80
End: 2025-08-04
Payer: MEDICARE

## 2025-08-04 DIAGNOSIS — M85.80 OSTEOPENIA AFTER MENOPAUSE: ICD-10-CM

## 2025-08-04 DIAGNOSIS — F41.9 ANXIETY: ICD-10-CM

## 2025-08-04 DIAGNOSIS — R09.81 SINUS CONGESTION: ICD-10-CM

## 2025-08-04 DIAGNOSIS — Z78.0 OSTEOPENIA AFTER MENOPAUSE: ICD-10-CM

## 2025-08-04 NOTE — TELEPHONE ENCOUNTER
Care Due:                  Date            Visit Type   Department     Provider  --------------------------------------------------------------------------------                                MYCHART                              ANNUAL       [facility] FAMILY MED                              CHECKUP/PHY  / INTERNAL MED Anthony Álvarez  Last Visit: 12-      S            / TINY Hurtado  Next Visit: None Scheduled  None         None Found                                                            Last  Test          Frequency    Reason                     Performed    Due Date  --------------------------------------------------------------------------------    Mg Level....  12 months..  alendronate..............  Not Found    Overdue    Phosphate...  12 months..  alendronate..............  Not Found    Overdue    [employer] Embedded Care Due Messages. Reference number: [id number].   8/04/2025 5:29:11 PM CDT

## 2025-08-04 NOTE — TELEPHONE ENCOUNTER
No care due was identified.  [facility] Embedded Care Due Messages. Reference number: [id number].   8/04/2025 5:29:42 PM CDT

## 2025-08-05 ENCOUNTER — PATIENT OUTREACH (OUTPATIENT)
Dept: ADMINISTRATIVE | Facility: HOSPITAL | Age: 80
End: 2025-08-05
Payer: MEDICARE

## 2025-08-05 DIAGNOSIS — M81.0 OSTEOPOROSIS, UNSPECIFIED OSTEOPOROSIS TYPE, UNSPECIFIED PATHOLOGICAL FRACTURE PRESENCE: Primary | ICD-10-CM

## 2025-08-05 RX ORDER — ALPRAZOLAM 0.25 MG/1
TABLET ORAL
Qty: 30 TABLET | Refills: 0 | Status: SHIPPED | OUTPATIENT
Start: 2025-08-05

## 2025-08-05 RX ORDER — ALENDRONATE SODIUM 70 MG/1
70 TABLET ORAL
Qty: 12 TABLET | Refills: 0 | Status: SHIPPED | OUTPATIENT
Start: 2025-08-05

## 2025-08-05 RX ORDER — FLUTICASONE PROPIONATE 50 MCG
1 SPRAY, SUSPENSION (ML) NASAL DAILY PRN
Qty: 15.8 ML | Refills: 0 | Status: SHIPPED | OUTPATIENT
Start: 2025-08-05

## 2025-08-05 NOTE — TELEPHONE ENCOUNTER
Patient due for office visit in December with myself or Kelly Mcdonald NP- please schedule.  Please address HM now, if applicable.

## 2025-08-07 ENCOUNTER — ANESTHESIA EVENT (OUTPATIENT)
Dept: SURGERY | Facility: HOSPITAL | Age: 80
End: 2025-08-07
Payer: MEDICARE

## 2025-08-07 ENCOUNTER — ANESTHESIA (OUTPATIENT)
Dept: SURGERY | Facility: HOSPITAL | Age: 80
End: 2025-08-07
Payer: MEDICARE

## 2025-08-07 ENCOUNTER — HOSPITAL ENCOUNTER (OUTPATIENT)
Facility: HOSPITAL | Age: 80
Discharge: HOME | End: 2025-08-07
Attending: OPHTHALMOLOGY | Admitting: OPHTHALMOLOGY
Payer: MEDICARE

## 2025-08-07 VITALS
SYSTOLIC BLOOD PRESSURE: 137 MMHG | BODY MASS INDEX: 28.66 KG/M2 | OXYGEN SATURATION: 98 % | WEIGHT: 172 LBS | TEMPERATURE: 98 F | HEART RATE: 80 BPM | RESPIRATION RATE: 18 BRPM | DIASTOLIC BLOOD PRESSURE: 82 MMHG | HEIGHT: 65 IN

## 2025-08-07 DIAGNOSIS — H25.11 NUCLEAR SCLEROTIC CATARACT OF RIGHT EYE: Primary | ICD-10-CM

## 2025-08-07 PROCEDURE — V2632 POST CHMBR INTRAOCULAR LENS: HCPCS | Mod: HCNC | Performed by: OPHTHALMOLOGY

## 2025-08-07 PROCEDURE — 36000707: Mod: HCNC | Performed by: OPHTHALMOLOGY

## 2025-08-07 PROCEDURE — 36000706: Mod: HCNC | Performed by: OPHTHALMOLOGY

## 2025-08-07 PROCEDURE — 37000009 HC ANESTHESIA EA ADD 15 MINS: Mod: HCNC | Performed by: OPHTHALMOLOGY

## 2025-08-07 PROCEDURE — 66984 XCAPSL CTRC RMVL W/O ECP: CPT | Mod: HCNC,RT,, | Performed by: OPHTHALMOLOGY

## 2025-08-07 PROCEDURE — 63600175 PHARM REV CODE 636 W HCPCS: Mod: HCNC | Performed by: OPHTHALMOLOGY

## 2025-08-07 PROCEDURE — 37000008 HC ANESTHESIA 1ST 15 MINUTES: Mod: HCNC | Performed by: OPHTHALMOLOGY

## 2025-08-07 PROCEDURE — 63600175 PHARM REV CODE 636 W HCPCS: Mod: HCNC | Performed by: STUDENT IN AN ORGANIZED HEALTH CARE EDUCATION/TRAINING PROGRAM

## 2025-08-07 PROCEDURE — 25000003 PHARM REV CODE 250: Mod: HCNC | Performed by: ANESTHESIOLOGY

## 2025-08-07 PROCEDURE — 25000003 PHARM REV CODE 250: Mod: HCNC | Performed by: OPHTHALMOLOGY

## 2025-08-07 PROCEDURE — 25000003 PHARM REV CODE 250: Mod: HCNC

## 2025-08-07 PROCEDURE — 71000015 HC POSTOP RECOV 1ST HR: Mod: HCNC | Performed by: OPHTHALMOLOGY

## 2025-08-07 DEVICE — IMPLANTABLE DEVICE: Type: IMPLANTABLE DEVICE | Site: EYE | Status: FUNCTIONAL

## 2025-08-07 RX ORDER — PREDNISOLONE ACETATE 10 MG/ML
SUSPENSION/ DROPS OPHTHALMIC
Status: DISCONTINUED | OUTPATIENT
Start: 2025-08-07 | End: 2025-08-07 | Stop reason: HOSPADM

## 2025-08-07 RX ORDER — POVIDONE-IODINE 5 %
SOLUTION, NON-ORAL OPHTHALMIC (EYE)
Status: DISCONTINUED | OUTPATIENT
Start: 2025-08-07 | End: 2025-08-07 | Stop reason: HOSPADM

## 2025-08-07 RX ORDER — ACETAMINOPHEN 325 MG/1
650 TABLET ORAL EVERY 4 HOURS PRN
Status: DISCONTINUED | OUTPATIENT
Start: 2025-08-07 | End: 2025-08-07 | Stop reason: HOSPADM

## 2025-08-07 RX ORDER — MIDAZOLAM HYDROCHLORIDE 1 MG/ML
INJECTION INTRAMUSCULAR; INTRAVENOUS
Status: DISCONTINUED | OUTPATIENT
Start: 2025-08-07 | End: 2025-08-07

## 2025-08-07 RX ORDER — LIDOCAINE HYDROCHLORIDE 20 MG/ML
INJECTION, SOLUTION INFILTRATION; PERINEURAL
Status: DISCONTINUED | OUTPATIENT
Start: 2025-08-07 | End: 2025-08-07 | Stop reason: HOSPADM

## 2025-08-07 RX ORDER — ONDANSETRON 4 MG/1
4 TABLET, ORALLY DISINTEGRATING ORAL ONCE
Status: COMPLETED | OUTPATIENT
Start: 2025-08-07 | End: 2025-08-07

## 2025-08-07 RX ORDER — SODIUM CHLORIDE 9 MG/ML
INJECTION, SOLUTION INTRAVENOUS CONTINUOUS
Status: DISCONTINUED | OUTPATIENT
Start: 2025-08-07 | End: 2025-08-07 | Stop reason: HOSPADM

## 2025-08-07 RX ORDER — CYCLOP/TROP/PROPA/PHEN/KET/WAT 1-1-0.1%
1 DROPS (EA) OPHTHALMIC (EYE)
Status: COMPLETED | OUTPATIENT
Start: 2025-08-07 | End: 2025-08-07

## 2025-08-07 RX ORDER — OFLOXACIN 3 MG/ML
1 SOLUTION/ DROPS OPHTHALMIC
Status: COMPLETED | OUTPATIENT
Start: 2025-08-07 | End: 2025-08-07

## 2025-08-07 RX ORDER — OFLOXACIN 3 MG/ML
SOLUTION/ DROPS OPHTHALMIC
Status: DISCONTINUED | OUTPATIENT
Start: 2025-08-07 | End: 2025-08-07 | Stop reason: HOSPADM

## 2025-08-07 RX ORDER — FLUORESCEIN SODIUM 1 MG/MG
STRIP OPHTHALMIC
Status: DISCONTINUED | OUTPATIENT
Start: 2025-08-07 | End: 2025-08-07 | Stop reason: HOSPADM

## 2025-08-07 RX ORDER — FENTANYL CITRATE 50 UG/ML
INJECTION, SOLUTION INTRAMUSCULAR; INTRAVENOUS
Status: DISCONTINUED | OUTPATIENT
Start: 2025-08-07 | End: 2025-08-07

## 2025-08-07 RX ORDER — HYDROCODONE BITARTRATE AND ACETAMINOPHEN 5; 325 MG/1; MG/1
1 TABLET ORAL EVERY 4 HOURS PRN
Refills: 0 | Status: DISCONTINUED | OUTPATIENT
Start: 2025-08-07 | End: 2025-08-07 | Stop reason: HOSPADM

## 2025-08-07 RX ADMIN — Medication 1 DROP: at 07:08

## 2025-08-07 RX ADMIN — OFLOXACIN 1 DROP: 3 SOLUTION/ DROPS OPHTHALMIC at 07:08

## 2025-08-07 RX ADMIN — MIDAZOLAM HYDROCHLORIDE 1 MG: 1 INJECTION INTRAMUSCULAR; INTRAVENOUS at 09:08

## 2025-08-07 RX ADMIN — FENTANYL CITRATE 50 MCG: 50 INJECTION, SOLUTION INTRAMUSCULAR; INTRAVENOUS at 09:08

## 2025-08-07 RX ADMIN — ONDANSETRON 4 MG: 4 TABLET, ORALLY DISINTEGRATING ORAL at 10:08

## 2025-08-07 RX ADMIN — FENTANYL CITRATE 25 MCG: 50 INJECTION, SOLUTION INTRAMUSCULAR; INTRAVENOUS at 09:08

## 2025-08-07 RX ADMIN — SODIUM CHLORIDE: 9 INJECTION, SOLUTION INTRAVENOUS at 07:08

## 2025-08-07 RX ADMIN — SODIUM CHLORIDE, SODIUM LACTATE, POTASSIUM CHLORIDE, AND CALCIUM CHLORIDE: .6; .31; .03; .02 INJECTION, SOLUTION INTRAVENOUS at 09:08

## 2025-08-07 NOTE — ANESTHESIA PREPROCEDURE EVALUATION
2025  Gale Fernández is a 80 y.o., female.  To undergo Procedure(s) (LRB):  PHACOEMULSIFICATION, CATARACT (Right)  INSERTION, IOL PROSTHESIS (Right)     Denies CP/SOB/GERD/MI/CVA/URI symptoms.  METS > 4  NPO Adequate    Past Medical History:  Past Medical History:   Diagnosis Date    Atrial fibrillation     Followed by EP cardiology    Borderline hyperlipidemia     Cataract     History of abnormal Pap smear     More than 20 years ago; status post colposcopy    History of anxiety disorder     History of hypertension     Currently doing okay off medication    Hypertension     Osteopenia     Refuses medication    Overweight(278.02)     Stroke 2019       Past Surgical History:  Past Surgical History:   Procedure Laterality Date     SECTION, LOW TRANSVERSE      X1    COLONOSCOPY N/A 2020    Procedure: COLONOSCOPY;  Surgeon: Maicol Johnson MD;  Location: [address]);  Service: Endoscopy;  Laterality: N/A;  Covid test on . Pt. holding Xarelto for 2 days prior to.EC    left knee meniscus surgery      neck tuck and liposuction      PSVT ablation      RADIOFREQUENCY ABLATION      Skin cancer removal on the left eye         Social History:  Social History[1]    Medications:  Medications Ordered Prior to Encounter[2]    Allergies:  Review of patient's allergies indicates:  No Known Allergies    Active Problems:  Problem List[3]    24 Hour Vitals:  Temp:  [36.4 °C (97.6 °F)] 36.4 °C (97.6 °F)  Pulse:  [74] 74  Resp:  [18] 18  SpO2:  [99 %] 99 %  BP: (163)/(86) 163/86   See Nursing Charting For Additional Vitals      Pre-op Assessment    I have reviewed the Patient Summary Reports.     I have reviewed the Nursing Notes.       Review of Systems  Anesthesia Hx:  No problems with previous Anesthesia               Denies Personal Hx of Anesthesia complications.                     Social:  Former Smoker, Social Alcohol Use       Cardiovascular:  Exercise tolerance: good   Hypertension    Dysrhythmias atrial fibrillation      hyperlipidemia                               Pulmonary:  Pulmonary Normal                       Hepatic/GI:  Hepatic/GI Normal                    Neurological:  TIA,                                     Endocrine:  Endocrine Normal            Psych:   anxiety                 Physical Exam  General: Well nourished and Cooperative    Airway:  Mallampati: II   Mouth Opening: Normal  TM Distance: Normal    Dental:  Intact    Chest/Lungs:  Clear to auscultation, Normal Respiratory Rate    Heart:  Rate: Normal  Rhythm: Regular Rhythm        Anesthesia Plan  Type of Anesthesia, risks & benefits discussed:    Anesthesia Type: Gen ETT, Gen Supraglottic Airway, MAC  Intra-op Monitoring Plan: Standard ASA Monitors  Post Op Pain Control Plan: multimodal analgesia  Induction:  IV  Informed Consent: Informed consent signed with the Patient and all parties understand the risks and agree with anesthesia plan.  All questions answered.   ASA Score: 3    Ready For Surgery From Anesthesia Perspective.     .           [1]   Social History  Socioeconomic History    Marital status:     Number of children: 1   Occupational History    Occupation: Teacher   Tobacco Use    Smoking status: Former     Types: Cigarettes    Smokeless tobacco: Never   Substance and Sexual Activity    Alcohol use: Yes     Comment: Rarely    Drug use: No     Social Drivers of Health     Financial Resource Strain: Low Risk  (1/21/2024)    Overall Financial Resource Strain (CARDIA)     Difficulty of Paying Living Expenses: Not very hard   Food Insecurity: No Food Insecurity (1/21/2024)    Hunger Vital Sign     Worried About Running Out of Food in the Last Year: Never true     Ran Out of Food in the Last Year: Never true   Transportation Needs: No Transportation Needs (1/21/2024)    PRAPARE - Transportation     Lack  of Transportation (Medical): No     Lack of Transportation (Non-Medical): No   Physical Activity: Unknown (1/21/2024)    Exercise Vital Sign     Minutes of Exercise per Session: 0 min   Stress: Stress Concern Present (1/21/2024)    [employer] [place] [employer] - Occupational Stress Questionnaire     Feeling of Stress : To some extent   Housing Stability: Low Risk  (1/21/2024)    Housing Stability Vital Sign     Unable to Pay for Housing in the Last Year: No     Number of Places Lived in the Last Year: 1     Unstable Housing in the Last Year: No   [2]   No current facility-administered medications on file prior to encounter.     Current Outpatient Medications on File Prior to Encounter   Medication Sig Dispense Refill    amLODIPine (NORVASC) 5 MG tablet TAKE 1 TABLET EVERY DAY 90 tablet 2    atorvastatin (LIPITOR) 20 MG tablet TAKE 1 TABLET EVERY DAY 90 tablet 3    calcium-vitamin D3 (CALCIUM 500 + D) 500 mg-5 mcg (200 unit) per tablet Take 1 tablet by mouth 2 (two) times daily with meals. 60 tablet 3    estradioL (ESTRACE) 0.01 % (0.1 mg/gram) vaginal cream Place 1 g vaginally twice a week. 42.5 g 3    gabapentin (NEURONTIN) 100 MG capsule Take 1 capsule (100 mg total) by mouth 3 (three) times daily. 90 capsule 11    rivaroxaban (XARELTO) 20 mg Tab Take 1 tablet (20 mg total) by mouth every evening. 90 tablet 3    furosemide (LASIX) 20 MG tablet Take 1 tablet (20 mg total) by mouth 2 (two) times daily as needed (Leg Swelling). 90 tablet 2   [3]   Patient Active Problem List  Diagnosis    Essential hypertension    Osteopenia after menopause    Permanent atrial fibrillation    Borderline hyperlipidemia    Current use of long term anticoagulation    Prediabetes    History of stroke    History of TIA (transient ischemic attack)    Obesity (BMI 30-39.9)    Anxiety    Burning mouth syndrome    Wears contact lenses    Refractive error    Nuclear sclerosis of both eyes    Sinus congestion

## 2025-08-07 NOTE — ANESTHESIA POSTPROCEDURE EVALUATION
Anesthesia Post Evaluation    Patient: Gale Fernández    Procedure(s) Performed: Procedure(s) (LRB):  PHACOEMULSIFICATION, CATARACT (Right)  INSERTION, IOL PROSTHESIS (Right)    Final Anesthesia Type: MAC      Patient location during evaluation: PACU  Patient participation: Yes- Able to Participate  Level of consciousness: awake and alert and oriented  Post-procedure vital signs: reviewed and stable  Pain management: adequate  Airway patency: patent    PONV status at discharge: No PONV  Anesthetic complications: no      Cardiovascular status: hemodynamically stable and blood pressure returned to baseline  Respiratory status: spontaneous ventilation, room air and unassisted  Hydration status: euvolemic  Follow-up not needed.              Vitals Value Taken Time   /82 08/07/25 10:02   Temp 36.5 °C (97.7 °F) 08/07/25 10:02   Pulse 80 08/07/25 10:02   Resp 18 08/07/25 10:02   SpO2 98 % 08/07/25 10:02         No case tracking events are documented in the log.      Pain/Holger Score: Holger Score: 10 (8/7/2025 10:00 AM)

## 2025-08-07 NOTE — DISCHARGE INSTRUCTIONS
Post Operative Instructions for   C[facility]- [facility]      LUIS YANG M.D.   OPHTHALMOLOGY       STARTING THE SAME DAY OF SURGERY:   Place one (1) drop of Prednisolone-Moxifloxacin-Bromfenac (shake bottle), into the operative eye three (3) time a day (Morning, Noon, and Nighttime).       You will use this drop for four (4) weeks following surgery.          1 DAY POST OPERATIVE APPOINTMENT:        Tomorrow at 8:30am     Location:  [facility]- [address] [place] [address]            RESTRICTIONS FOR SEVEN (7) DAYS FOLLOWING SURGERY:   DO NOT lift anything over 10 pounds.   DO NOT bend at the waist, only at the knees (keep head in upright position).    DO NOT rub your eye.    DO NOT get any water into the eye.    DO NOT wear any makeup, lotions, or creams on/around the eye.   Wear the protective eye shield you were given after surgery anytime you go to sleep.  You may remove the shield while awake.        No driving, drinking alcohol, or signing legal documents for the next 24 hours.         PLEASE NOTE:       You may take over the counter pain medication such as Tylenol or Ibuprofen as directed, if needed for pain.    ***If you experience severe pain, loss of vision, sudden onset of flashes and/or floaters,     IMMEDIATELY CALL Dr. Carranza Office: [phone], AFTER HOURs: [phone] OR proceed to the EMERGENCY ROOM***.      Fall Prevention  Millions of people fall every year and injure themselves. You may have had anesthesia or sedation which may increase your risk of falling. You may have health issues that put you at an increased risk of falling.     Here are ways to reduce your risk of falling.    Make your home safe by keeping walkways clear of objects you may trip over.  Use non-slip pads under rugs. Do not use area rugs or small throw rugs.  Use non-slip mats in bathtubs and showers.  Install handrails and lights on staircases.  Do not walk in poorly lit  areas.  Do not stand on chairs or wobbly ladders.  Use caution when reaching overhead or looking upward. This position can cause a loss of balance.  Be sure your shoes fit properly, have non-slip bottoms and are in good condition.   Wear shoes both inside and out. Avoid going barefoot or wearing slippers.  Be cautious when going up and down stairs, curbs, and when walking on uneven sidewalks.  If your balance is poor, consider using a cane or walker.  If your fall was related to alcohol use, stop or limit alcohol intake.   If your fall was related to use of sleeping medicines, talk to your doctor about this. You may need to reduce your dosage at bedtime if you awaken during the night to go to the bathroom.    To reduce the need for nighttime bathroom trips:  Avoid drinking fluids for several hours before going to bed  Empty your bladder before going to bed  Men can keep a urinal at the bedside  Stay as active as you can. Balance, flexibility, strength, and endurance all come from exercise. They all play a role in preventing falls. Ask your healthcare provider which types of activity are right for you.  Get your vision checked on a regular basis.  If you have pets, know where they are before you stand up or walk so you don't trip over them.  Use night lights.    Fall Prevention  Millions of people fall every year and injure themselves. You may have had anesthesia or sedation which may increase your risk of falling. You may have health issues that put you at an increased risk of falling.     Here are ways to reduce your risk of falling.    Make your home safe by keeping walkways clear of objects you may trip over.  Use non-slip pads under rugs. Do not use area rugs or small throw rugs.  Use non-slip mats in bathtubs and showers.  Install handrails and lights on staircases.  Do not walk in poorly lit areas.  Do not stand on chairs or wobbly ladders.  Use caution when reaching overhead or looking upward. This position  can cause a loss of balance.  Be sure your shoes fit properly, have non-slip bottoms and are in good condition.   Wear shoes both inside and out. Avoid going barefoot or wearing slippers.  Be cautious when going up and down stairs, curbs, and when walking on uneven sidewalks.  If your balance is poor, consider using a cane or walker.  If your fall was related to alcohol use, stop or limit alcohol intake.   If your fall was related to use of sleeping medicines, talk to your doctor about this. You may need to reduce your dosage at bedtime if you awaken during the night to go to the bathroom.    To reduce the need for nighttime bathroom trips:  Avoid drinking fluids for several hours before going to bed  Empty your bladder before going to bed  Men can keep a urinal at the bedside  Stay as active as you can. Balance, flexibility, strength, and endurance all come from exercise. They all play a role in preventing falls. Ask your healthcare provider which types of activity are right for you.  Get your vision checked on a regular basis.  If you have pets, know where they are before you stand up or walk so you don't trip over them.  Use night lights.

## 2025-08-07 NOTE — TRANSFER OF CARE
"Anesthesia Transfer of Care Note    Patient: Gale Fernández    Procedure(s) Performed: Procedure(s) (LRB):  PHACOEMULSIFICATION, CATARACT (Right)  INSERTION, IOL PROSTHESIS (Right)    Patient location: [facility]    Anesthesia Type: MAC    Transport from OR: Transported from OR on room air with adequate spontaneous ventilation    Post pain: adequate analgesia    Post assessment: no apparent anesthetic complications and tolerated procedure well    Post vital signs: stable    Level of consciousness: awake and alert    Nausea/Vomiting: no nausea/vomiting    Complications: none    Transfer of care protocol was followed      Last vitals: Visit Vitals  /82   Pulse 80   Temp 36.5 °C (97.7 °F) (Oral)   Resp 18   Ht 5' 5" (1.651 m)   Wt 78 kg (172 lb)   LMP 07/24/2012   SpO2 98%   Breastfeeding No   BMI 28.62 kg/m²     "

## 2025-08-07 NOTE — BRIEF OP NOTE
[facility] [place]  Brief Operative Note    Surgery Date: 8/7/2025     Surgeons and Role:     * Kye Ayala MD - Primary    Assisting Surgeon: None    Pre-op Diagnosis:  NS (nuclear sclerosis), right [H25.11]    Post-op Diagnosis:  Post-Op Diagnosis Codes:     * NS (nuclear sclerosis), right [H25.11]    Procedure(s) (LRB):  PHACOEMULSIFICATION, CATARACT (Right)  INSERTION, IOL PROSTHESIS (Right)    Anesthesia: Local MAC    Operative Findings:     Estimated Blood Loss: * No values recorded between 8/7/2025  9:14 AM and 8/7/2025 10:00 AM *         Specimens:   Specimen (24h ago, onward)      None            * No specimens in log *        Discharge Note    OUTCOME: Patient tolerated treatment/procedure well without complication and is now ready for discharge.    DISPOSITION: Home or Self Care    FINAL DIAGNOSIS:  Nuclear sclerotic cataract of right eye    FOLLOWUP: In clinic    DISCHARGE INSTRUCTIONS:    Discharge Procedure Orders   Other restrictions (specify):   Order Comments: No heavy lifting or bending for 1 week.

## 2025-08-07 NOTE — OP NOTE
Operative Date:  08/07/2025    Discharge Date:  08/07/2025    Discharge Patient Home    Report Title: Operative Note      SURGEON: Kye Ayala MD     ASSISTANT:     PREOPERATIVE DIAGNOSIS: Visually significant NSC cataract,  Right Eye.    POSTOPERATIVE DIAGNOSIS: Visually-significant NSC cataract,  Right Eye.    PROCEDURE PERFORMED: Phacoemulsification of the cataract with posterior chamber intraocular lens Right Eye.    ANESTHESIA: Topical with MAC     COMPLICATIONS: None    ESTIMATED BLOOD LOSS: Minimal    INDICATIONS FOR PROCEDURE:   The patient is a pleasant 80 year old woman with increasing difficulties with activities of daily living secondary to a dense visually significant cataract in the Right Eye.  Discussions have been carried out with this patient concerning the options to surgery, risks, benefits and expectations.  The patient voiced good understanding and wished to proceed with the above procedure.    PROCEDURE IN DETAIL: The patient was brought to the operating room and received topical anesthetic to the eye and then was prepped and draped in the usual sterile fashion.  Using the Treviño ring and the guarded michael blade set at 0.37 mm, a partial thickness clear cornea incision was made temporally.  The paracentesis site was made at the twelve o'clock position.  Omidria was injected into the anterior chamber through the paracentesis.  Viscoat was then injected into the anterior chamber.  The eye was then reentered at the primary surgical site with a 2.4 mm keratome followed by continuous capsulotomy, hydrodissection, hydrodelineation and phacoemulsification of the cataract.  Residual cortical material was removed using automated irrigation-aspiration technique.  Healon was injected into the posterior chamber and a Tecnis DCBOO 5.5 diopter lens was placed in the bag without difficulty. Residual viscoelastic was removed using automated irrigation-aspiration technique. The eye was  re-pressurized using BSS solution and both the paracentesis site and the primary surgical site were demonstrated to be watertight at the end of the case with Weck--Renea manipulation.  One drop of Ofloxacin and one drop of Pred acetate 1% was applied to the Right Eye .The eye was closed, patched and a Cooper shield placed.  The patient was taken to the recovery room in good and stable condition.  The patient tolerated the procedure well.  The patient was instructed to refrain from any heavy lifting, bending, stooping or straining activities, discharged home  and to follow-up in the morning for routine postoperative care with Kye Ayala MD.

## 2025-08-08 ENCOUNTER — OFFICE VISIT (OUTPATIENT)
Dept: OPHTHALMOLOGY | Facility: CLINIC | Age: 80
End: 2025-08-08
Payer: MEDICARE

## 2025-08-08 DIAGNOSIS — Z98.890 POST-OPERATIVE STATE: Primary | ICD-10-CM

## 2025-08-08 PROCEDURE — 99999 PR PBB SHADOW E&M-EST. PATIENT-LVL II: CPT | Mod: PBBFAC,HCNC,, | Performed by: OPHTHALMOLOGY

## 2025-08-08 NOTE — PROGRESS NOTES
Subjective:       Patient ID: Gale Fernández is a 80 y.o. female.    Chief Complaint: Post-op Evaluation    HPI    Here for 1 day S/p Phaco w/Iol OD 08-07-25    Eye meds: PMB OD TID     80 year old female states she removed patch yesterday. Denies ocular pain.   Pt says she is still dilated with blurry vision OD and voices concerns if   she is able to drive while still blurry. C/o PMB drops stinging   Last edited by Kye Ayala MD on 8/8/2025 12:21 PM.             Assessment:       1. Post-operative state        Plan:       S/p CE OD- Doing well but with small K abrasion.        CPM OD.  EES devon qd-tid OD prn.  RTC 1 wk.

## 2025-08-14 ENCOUNTER — OFFICE VISIT (OUTPATIENT)
Dept: OPHTHALMOLOGY | Facility: CLINIC | Age: 80
End: 2025-08-14
Payer: MEDICARE

## 2025-08-14 DIAGNOSIS — H25.12 NS (NUCLEAR SCLEROSIS), LEFT: ICD-10-CM

## 2025-08-14 DIAGNOSIS — Z98.890 POST-OPERATIVE STATE: Primary | ICD-10-CM

## 2025-08-14 PROCEDURE — 99999 PR PBB SHADOW E&M-EST. PATIENT-LVL II: CPT | Mod: PBBFAC,HCNC,, | Performed by: OPHTHALMOLOGY

## 2025-08-18 ENCOUNTER — HOSPITAL ENCOUNTER (OUTPATIENT)
Dept: PREADMISSION TESTING | Facility: HOSPITAL | Age: 80
Discharge: HOME | End: 2025-08-18
Attending: OPHTHALMOLOGY
Payer: MEDICARE

## 2025-08-21 ENCOUNTER — TELEPHONE (OUTPATIENT)
Dept: FAMILY MEDICINE | Facility: CLINIC | Age: 80
End: 2025-08-21
Payer: MEDICARE

## 2025-08-21 DIAGNOSIS — R32 INCONTINENCE IN FEMALE: ICD-10-CM

## 2025-08-21 DIAGNOSIS — L85.3 DRY SKIN: Primary | ICD-10-CM

## 2025-08-27 ENCOUNTER — ANESTHESIA EVENT (OUTPATIENT)
Dept: SURGERY | Facility: HOSPITAL | Age: 80
End: 2025-08-27
Payer: MEDICARE

## 2025-08-28 ENCOUNTER — ANESTHESIA (OUTPATIENT)
Dept: SURGERY | Facility: HOSPITAL | Age: 80
End: 2025-08-28
Payer: MEDICARE

## 2025-08-28 ENCOUNTER — HOSPITAL ENCOUNTER (OUTPATIENT)
Facility: HOSPITAL | Age: 80
Discharge: HOME | End: 2025-08-28
Attending: OPHTHALMOLOGY | Admitting: OPHTHALMOLOGY
Payer: MEDICARE

## 2025-08-28 VITALS
OXYGEN SATURATION: 98 % | DIASTOLIC BLOOD PRESSURE: 74 MMHG | TEMPERATURE: 98 F | HEART RATE: 81 BPM | SYSTOLIC BLOOD PRESSURE: 159 MMHG | WEIGHT: 172 LBS | BODY MASS INDEX: 28.62 KG/M2 | RESPIRATION RATE: 18 BRPM

## 2025-08-28 DIAGNOSIS — H25.12 NUCLEAR SCLEROTIC CATARACT OF LEFT EYE: Primary | ICD-10-CM

## 2025-08-28 PROCEDURE — V2632 POST CHMBR INTRAOCULAR LENS: HCPCS | Mod: HCNC | Performed by: OPHTHALMOLOGY

## 2025-08-28 PROCEDURE — 36000706: Mod: HCNC | Performed by: OPHTHALMOLOGY

## 2025-08-28 PROCEDURE — 27201423 OPTIME MED/SURG SUP & DEVICES STERILE SUPPLY: Mod: HCNC | Performed by: OPHTHALMOLOGY

## 2025-08-28 PROCEDURE — 63600175 PHARM REV CODE 636 W HCPCS: Mod: HCNC | Performed by: OPHTHALMOLOGY

## 2025-08-28 PROCEDURE — 25000003 PHARM REV CODE 250: Mod: HCNC

## 2025-08-28 PROCEDURE — 25000003 PHARM REV CODE 250: Mod: HCNC | Performed by: OPHTHALMOLOGY

## 2025-08-28 PROCEDURE — 71000015 HC POSTOP RECOV 1ST HR: Mod: HCNC | Performed by: OPHTHALMOLOGY

## 2025-08-28 PROCEDURE — 36000707: Mod: HCNC | Performed by: OPHTHALMOLOGY

## 2025-08-28 PROCEDURE — 37000008 HC ANESTHESIA 1ST 15 MINUTES: Mod: HCNC | Performed by: OPHTHALMOLOGY

## 2025-08-28 PROCEDURE — 37000009 HC ANESTHESIA EA ADD 15 MINS: Mod: HCNC | Performed by: OPHTHALMOLOGY

## 2025-08-28 PROCEDURE — 63600175 PHARM REV CODE 636 W HCPCS: Mod: HCNC | Performed by: STUDENT IN AN ORGANIZED HEALTH CARE EDUCATION/TRAINING PROGRAM

## 2025-08-28 PROCEDURE — 66984 XCAPSL CTRC RMVL W/O ECP: CPT | Mod: 79,HCNC,LT, | Performed by: OPHTHALMOLOGY

## 2025-08-28 RX ORDER — OFLOXACIN 3 MG/ML
1 SOLUTION/ DROPS OPHTHALMIC
Status: COMPLETED | OUTPATIENT
Start: 2025-08-28 | End: 2025-08-28

## 2025-08-28 RX ORDER — MIDAZOLAM HYDROCHLORIDE 1 MG/ML
INJECTION INTRAMUSCULAR; INTRAVENOUS
Status: DISCONTINUED | OUTPATIENT
Start: 2025-08-28 | End: 2025-08-28

## 2025-08-28 RX ORDER — FLUORESCEIN SODIUM 1 MG/MG
STRIP OPHTHALMIC
Status: DISCONTINUED | OUTPATIENT
Start: 2025-08-28 | End: 2025-08-28 | Stop reason: HOSPADM

## 2025-08-28 RX ORDER — PREDNISOLONE ACETATE 10 MG/ML
SUSPENSION/ DROPS OPHTHALMIC
Status: DISCONTINUED | OUTPATIENT
Start: 2025-08-28 | End: 2025-08-28 | Stop reason: HOSPADM

## 2025-08-28 RX ORDER — SODIUM CHLORIDE, SODIUM LACTATE, POTASSIUM CHLORIDE, CALCIUM CHLORIDE 600; 310; 30; 20 MG/100ML; MG/100ML; MG/100ML; MG/100ML
INJECTION, SOLUTION INTRAVENOUS CONTINUOUS
Status: DISCONTINUED | OUTPATIENT
Start: 2025-08-28 | End: 2025-08-28 | Stop reason: HOSPADM

## 2025-08-28 RX ORDER — POVIDONE-IODINE 5 %
SOLUTION, NON-ORAL OPHTHALMIC (EYE)
Status: DISCONTINUED | OUTPATIENT
Start: 2025-08-28 | End: 2025-08-28 | Stop reason: HOSPADM

## 2025-08-28 RX ORDER — CYCLOP/TROP/PROPA/PHEN/KET/WAT 1-1-0.1%
1 DROPS (EA) OPHTHALMIC (EYE)
Status: COMPLETED | OUTPATIENT
Start: 2025-08-28 | End: 2025-08-28

## 2025-08-28 RX ORDER — ACETAMINOPHEN 325 MG/1
650 TABLET ORAL EVERY 4 HOURS PRN
Status: DISCONTINUED | OUTPATIENT
Start: 2025-08-28 | End: 2025-08-28 | Stop reason: HOSPADM

## 2025-08-28 RX ORDER — OFLOXACIN 3 MG/ML
SOLUTION/ DROPS OPHTHALMIC
Status: DISCONTINUED | OUTPATIENT
Start: 2025-08-28 | End: 2025-08-28 | Stop reason: HOSPADM

## 2025-08-28 RX ORDER — HYDROCODONE BITARTRATE AND ACETAMINOPHEN 5; 325 MG/1; MG/1
1 TABLET ORAL EVERY 4 HOURS PRN
Status: DISCONTINUED | OUTPATIENT
Start: 2025-08-28 | End: 2025-08-28 | Stop reason: HOSPADM

## 2025-08-28 RX ORDER — SODIUM CHLORIDE 9 MG/ML
INJECTION, SOLUTION INTRAVENOUS CONTINUOUS
Status: DISCONTINUED | OUTPATIENT
Start: 2025-08-28 | End: 2025-08-28 | Stop reason: HOSPADM

## 2025-08-28 RX ORDER — FENTANYL CITRATE 50 UG/ML
INJECTION, SOLUTION INTRAMUSCULAR; INTRAVENOUS
Status: DISCONTINUED | OUTPATIENT
Start: 2025-08-28 | End: 2025-08-28

## 2025-08-28 RX ORDER — LIDOCAINE HYDROCHLORIDE 20 MG/ML
INJECTION, SOLUTION INFILTRATION; PERINEURAL
Status: DISCONTINUED | OUTPATIENT
Start: 2025-08-28 | End: 2025-08-28 | Stop reason: HOSPADM

## 2025-08-28 RX ADMIN — FENTANYL CITRATE 25 MCG: 50 INJECTION, SOLUTION INTRAMUSCULAR; INTRAVENOUS at 09:08

## 2025-08-28 RX ADMIN — OFLOXACIN 1 DROP: 3 SOLUTION/ DROPS OPHTHALMIC at 06:08

## 2025-08-28 RX ADMIN — MIDAZOLAM HYDROCHLORIDE 1 MG: 1 INJECTION INTRAMUSCULAR; INTRAVENOUS at 07:08

## 2025-08-28 RX ADMIN — FENTANYL CITRATE 25 MCG: 50 INJECTION, SOLUTION INTRAMUSCULAR; INTRAVENOUS at 08:08

## 2025-08-28 RX ADMIN — Medication 1 DROP: at 06:08

## 2025-08-28 RX ADMIN — FENTANYL CITRATE 50 MCG: 50 INJECTION, SOLUTION INTRAMUSCULAR; INTRAVENOUS at 07:08

## 2025-08-28 RX ADMIN — SODIUM CHLORIDE: 9 INJECTION, SOLUTION INTRAVENOUS at 06:08

## 2025-08-29 ENCOUNTER — OFFICE VISIT (OUTPATIENT)
Dept: OPHTHALMOLOGY | Facility: CLINIC | Age: 80
End: 2025-08-29
Payer: MEDICARE

## 2025-08-29 DIAGNOSIS — Z98.890 POST-OPERATIVE STATE: Primary | ICD-10-CM

## 2025-08-29 PROCEDURE — 99999 PR PBB SHADOW E&M-EST. PATIENT-LVL III: CPT | Mod: PBBFAC,HCNC,, | Performed by: OPHTHALMOLOGY

## 2025-09-04 ENCOUNTER — OFFICE VISIT (OUTPATIENT)
Dept: OPHTHALMOLOGY | Facility: CLINIC | Age: 80
End: 2025-09-04
Payer: MEDICARE

## 2025-09-04 DIAGNOSIS — H52.7 REFRACTIVE ERROR: ICD-10-CM

## 2025-09-04 DIAGNOSIS — Z98.890 POST-OPERATIVE STATE: Primary | ICD-10-CM

## 2025-09-04 PROCEDURE — 99999 PR PBB SHADOW E&M-EST. PATIENT-LVL II: CPT | Mod: PBBFAC,HCNC,, | Performed by: OPHTHALMOLOGY

## (undated) DEVICE — GLOVE SIGNATURE MICRO LTX 6

## (undated) DEVICE — GLOVE BIOGEL ECLIPSE SZ 6.5

## (undated) DEVICE — GOWN NONREINF SET-IN SLV XL

## (undated) DEVICE — GLOVE BIOGEL ECLIPSE SZ 7.5

## (undated) DEVICE — SYR 3CC LUER LOC

## (undated) DEVICE — CARTRIDGE B MONARCH II 10/BX

## (undated) DEVICE — GLOVE BIOGEL ECLIPSE SZ 6

## (undated) DEVICE — SYR LUER LOCK 1CC

## (undated) DEVICE — KIT CUSTOM BASIC EYE ST/MEA

## (undated) DEVICE — PROBE VITRECTOMY CENTURION 23G

## (undated) DEVICE — Device

## (undated) DEVICE — SHEILD & GARTERS FOX METAL EYE

## (undated) DEVICE — GLOVE SENSICARE PI MICRO 7.5

## (undated) DEVICE — TOWEL OR DISP STRL BLUE 4/PK

## (undated) DEVICE — KIT EYE PIC PACK WB

## (undated) DEVICE — GLOVE SENSICARE PI GRN 6.5

## (undated) DEVICE — GLOVE BIOGEL ECLIPSE SZ 7

## (undated) DEVICE — SOL WATER STERILE IRR 500ML

## (undated) DEVICE — NDL HYPO STD REG BVL 18GX1.5IN

## (undated) DEVICE — GLOVE SENSICARE PI GRN 8

## (undated) DEVICE — SYR 10CC LUER LOCK